# Patient Record
Sex: FEMALE | Race: OTHER | Employment: FULL TIME | URBAN - METROPOLITAN AREA
[De-identification: names, ages, dates, MRNs, and addresses within clinical notes are randomized per-mention and may not be internally consistent; named-entity substitution may affect disease eponyms.]

---

## 2017-03-28 ENCOUNTER — TELEPHONE (OUTPATIENT)
Dept: INTERNAL MEDICINE CLINIC | Facility: CLINIC | Age: 37
End: 2017-03-28

## 2017-03-28 RX ORDER — PREGABALIN 50 MG/1
50 CAPSULE ORAL EVERY EVENING
Qty: 30 CAPSULE | Refills: 0 | Status: SHIPPED | OUTPATIENT
Start: 2017-03-28 | End: 2017-06-22

## 2017-03-28 RX ORDER — PREGABALIN 25 MG/1
25 CAPSULE ORAL
Qty: 30 CAPSULE | Refills: 0 | Status: SHIPPED | OUTPATIENT
Start: 2017-03-28 | End: 2017-05-24

## 2017-03-28 RX ORDER — VALACYCLOVIR HYDROCHLORIDE 1 G/1
TABLET, FILM COATED ORAL
Refills: 2 | COMMUNITY
Start: 2017-02-20 | End: 2017-06-22

## 2017-03-28 RX ORDER — SULFAMETHOXAZOLE AND TRIMETHOPRIM 800; 160 MG/1; MG/1
TABLET ORAL
Refills: 0 | COMMUNITY
Start: 2017-02-20 | End: 2017-06-22

## 2017-03-28 RX ORDER — CONJUGATED ESTROGENS 0.62 MG/G
CREAM VAGINAL
Refills: 2 | COMMUNITY
Start: 2017-03-03 | End: 2017-06-22

## 2017-03-28 RX ORDER — PREGABALIN 50 MG/1
50 CAPSULE ORAL 2 TIMES DAILY
Qty: 60 CAPSULE | Refills: 0 | Status: SHIPPED | OUTPATIENT
Start: 2017-03-28 | End: 2017-03-28 | Stop reason: DRUGHIGH

## 2017-03-28 RX ORDER — PREGABALIN 25 MG/1
25 CAPSULE ORAL 2 TIMES DAILY
Qty: 30 CAPSULE | Refills: 2 | Status: SHIPPED | OUTPATIENT
Start: 2017-03-28 | End: 2017-03-28 | Stop reason: DRUGHIGH

## 2017-03-28 RX ORDER — METRONIDAZOLE 500 MG/1
TABLET ORAL
Refills: 0 | COMMUNITY
Start: 2017-02-03 | End: 2017-06-22

## 2017-05-24 ENCOUNTER — OFFICE VISIT (OUTPATIENT)
Dept: INTERNAL MEDICINE CLINIC | Facility: CLINIC | Age: 37
End: 2017-05-24

## 2017-05-24 VITALS
BODY MASS INDEX: 28.13 KG/M2 | HEIGHT: 66 IN | OXYGEN SATURATION: 96 % | WEIGHT: 175 LBS | HEART RATE: 64 BPM | DIASTOLIC BLOOD PRESSURE: 84 MMHG | SYSTOLIC BLOOD PRESSURE: 118 MMHG | RESPIRATION RATE: 17 BRPM

## 2017-05-24 DIAGNOSIS — R82.90 ABNORMAL URINALYSIS: ICD-10-CM

## 2017-05-24 DIAGNOSIS — R14.0 ABDOMINAL BLOATING: ICD-10-CM

## 2017-05-24 DIAGNOSIS — M54.9 BACK PAIN, UNSPECIFIED BACK LOCATION, UNSPECIFIED BACK PAIN LATERALITY, UNSPECIFIED CHRONICITY: ICD-10-CM

## 2017-05-24 DIAGNOSIS — M79.7 FIBROMYALGIA: ICD-10-CM

## 2017-05-24 DIAGNOSIS — R10.2 PELVIC PAIN: Primary | ICD-10-CM

## 2017-05-24 PROCEDURE — 36415 COLL VENOUS BLD VENIPUNCTURE: CPT | Performed by: INTERNAL MEDICINE

## 2017-05-24 PROCEDURE — 99213 OFFICE O/P EST LOW 20 MIN: CPT | Performed by: INTERNAL MEDICINE

## 2017-05-24 PROCEDURE — 82784 ASSAY IGA/IGD/IGG/IGM EACH: CPT | Performed by: INTERNAL MEDICINE

## 2017-05-24 PROCEDURE — 87086 URINE CULTURE/COLONY COUNT: CPT | Performed by: INTERNAL MEDICINE

## 2017-05-24 PROCEDURE — 83516 IMMUNOASSAY NONANTIBODY: CPT | Performed by: INTERNAL MEDICINE

## 2017-05-24 PROCEDURE — 80053 COMPREHEN METABOLIC PANEL: CPT | Performed by: INTERNAL MEDICINE

## 2017-05-24 PROCEDURE — 85025 COMPLETE CBC W/AUTO DIFF WBC: CPT | Performed by: INTERNAL MEDICINE

## 2017-05-24 RX ORDER — PREGABALIN 25 MG/1
25 CAPSULE ORAL
Qty: 90 CAPSULE | Refills: 0 | Status: SHIPPED | OUTPATIENT
Start: 2017-05-24 | End: 2017-06-22

## 2017-05-24 RX ORDER — MELOXICAM 15 MG/1
15 TABLET ORAL DAILY PRN
Qty: 15 TABLET | Refills: 2 | Status: SHIPPED | OUTPATIENT
Start: 2017-05-24 | End: 2017-06-08

## 2017-05-24 RX ORDER — PHENAZOPYRIDINE HYDROCHLORIDE 100 MG/1
TABLET, FILM COATED ORAL
Refills: 0 | COMMUNITY
Start: 2017-04-13 | End: 2017-06-22

## 2017-05-24 RX ORDER — PREGABALIN 75 MG/1
75 CAPSULE ORAL 2 TIMES DAILY
Qty: 90 CAPSULE | Refills: 0 | Status: SHIPPED | OUTPATIENT
Start: 2017-05-24 | End: 2017-05-24

## 2017-05-24 RX ORDER — PREGABALIN 75 MG/1
75 CAPSULE ORAL EVERY EVENING
Qty: 90 CAPSULE | Refills: 0 | Status: SHIPPED | OUTPATIENT
Start: 2017-05-24 | End: 2017-08-22

## 2017-05-24 RX ORDER — PENTOSAN POLYSULFATE SODIUM 100 MG/1
CAPSULE, GELATIN COATED ORAL
Refills: 0 | COMMUNITY
Start: 2017-05-04 | End: 2017-06-22

## 2017-05-24 NOTE — PROGRESS NOTES
Chayo Child is a 39year old female.     Chief complaint:back pain, pelvic cramps , abdominal bloating     HPI:   39year old female with PMH as listed below here for  Back pain   Started 5/10/18 lower back   No loss of bladder or bowel control  No radiati 66\"  Wt 175 lb  BMI 28.26 kg/m2  SpO2 96%  LMP 05/02/2017  GENERAL: well developed, well nourished,in no apparent distress  SKIN: no rashes,no suspicious lesions  HEENT: atraumatic, normocephalic,ears and throat are clear  NECK: supple,no adenopathy  LUNG back pain laterality, unspecified chronicity  Plan: mobic prn              Please return to the clinic if you are having persistent or worsening symptoms

## 2017-05-25 ENCOUNTER — HOSPITAL ENCOUNTER (OUTPATIENT)
Dept: ULTRASOUND IMAGING | Facility: HOSPITAL | Age: 37
Discharge: HOME | End: 2017-05-25
Attending: INTERNAL MEDICINE
Payer: COMMERCIAL

## 2017-05-25 DIAGNOSIS — R10.2 PELVIC PAIN: ICD-10-CM

## 2017-05-25 PROCEDURE — 76830 TRANSVAGINAL US NON-OB: CPT | Performed by: INTERNAL MEDICINE

## 2017-05-25 PROCEDURE — 76856 US EXAM PELVIC COMPLETE: CPT | Performed by: INTERNAL MEDICINE

## 2017-05-30 ENCOUNTER — TELEPHONE (OUTPATIENT)
Dept: INTERNAL MEDICINE CLINIC | Facility: CLINIC | Age: 37
End: 2017-05-30

## 2017-05-30 DIAGNOSIS — N83.209 OVARIAN CYST: Primary | ICD-10-CM

## 2017-05-31 ENCOUNTER — TELEPHONE (OUTPATIENT)
Dept: INTERNAL MEDICINE CLINIC | Facility: CLINIC | Age: 37
End: 2017-05-31

## 2017-06-09 ENCOUNTER — TELEPHONE (OUTPATIENT)
Dept: INTERNAL MEDICINE CLINIC | Facility: CLINIC | Age: 37
End: 2017-06-09

## 2017-06-09 NOTE — TELEPHONE ENCOUNTER
Pt's  verified  Dr. John Lenz re: missed appt- informed she waited for Pt and 2 phone calls sent no response

## 2017-06-22 ENCOUNTER — OFFICE VISIT (OUTPATIENT)
Dept: GASTROENTEROLOGY | Facility: CLINIC | Age: 37
End: 2017-06-22

## 2017-06-22 VITALS
SYSTOLIC BLOOD PRESSURE: 116 MMHG | HEIGHT: 65.5 IN | DIASTOLIC BLOOD PRESSURE: 76 MMHG | WEIGHT: 176.38 LBS | HEART RATE: 79 BPM | BODY MASS INDEX: 29.03 KG/M2

## 2017-06-22 DIAGNOSIS — R19.4 CHANGE IN BOWEL HABITS: ICD-10-CM

## 2017-06-22 DIAGNOSIS — R10.32 LLQ PAIN: ICD-10-CM

## 2017-06-22 DIAGNOSIS — R14.0 BLOATING: Primary | ICD-10-CM

## 2017-06-22 PROCEDURE — 99212 OFFICE O/P EST SF 10 MIN: CPT | Performed by: PHYSICIAN ASSISTANT

## 2017-06-22 PROCEDURE — 99244 OFF/OP CNSLTJ NEW/EST MOD 40: CPT | Performed by: PHYSICIAN ASSISTANT

## 2017-06-22 RX ORDER — HYDROCODONE BITARTRATE AND ACETAMINOPHEN 5; 325 MG/1; MG/1
1 TABLET ORAL ONCE AS NEEDED
Refills: 0 | COMMUNITY
Start: 2017-05-24 | End: 2017-06-22

## 2017-06-22 NOTE — H&P
[address] [facility] [address] Gastroenterology                                                                                                  Clinic History and Physical     Pa increase her bloating sensation. She denies dyspeptic symptoms such as increased belching. There is no report of indigestion or heartburn, dysphagia, odynophagia, nausea, vomiting or hematemesis.   She denies hematochezia, rectal bleeding, rectal pain, me BREAKFAST Disp: 30 capsule Rfl: 0   pregabalin (LYRICA) 75 MG Oral Cap Take 1 capsule (75 mg total) by mouth every evening.  Disp: 90 capsule Rfl: 0       Allergies:  No Known Allergies    ROS:   CONSTITUTIONAL:  negative for fevers, rigors  EYES:  negative vaginal cavity\" wherein patient underwent US Pelvis 5/2017 revealing \"mildly prominent uterine size and hemorrhagic appearing complex cyst in right ovary\".  Celiac disease panel negative, no anemia, LFTs normal.  Of note, the patient is to follow-up with

## 2017-06-26 ENCOUNTER — TELEPHONE (OUTPATIENT)
Dept: OBGYN CLINIC | Facility: CLINIC | Age: 37
End: 2017-06-26

## 2017-06-26 ENCOUNTER — OFFICE VISIT (OUTPATIENT)
Dept: OBGYN CLINIC | Facility: CLINIC | Age: 37
End: 2017-06-26

## 2017-06-26 VITALS
SYSTOLIC BLOOD PRESSURE: 101 MMHG | HEIGHT: 65.5 IN | WEIGHT: 177.63 LBS | DIASTOLIC BLOOD PRESSURE: 67 MMHG | BODY MASS INDEX: 29.24 KG/M2 | HEART RATE: 76 BPM

## 2017-06-26 DIAGNOSIS — R14.0 BLOATING: ICD-10-CM

## 2017-06-26 DIAGNOSIS — R10.2 PELVIC PAIN IN FEMALE: Primary | ICD-10-CM

## 2017-06-26 DIAGNOSIS — N89.8 VAGINAL DISCHARGE: ICD-10-CM

## 2017-06-26 DIAGNOSIS — R36.9 URETHRAL DISCHARGE: Primary | ICD-10-CM

## 2017-06-26 DIAGNOSIS — N83.209 HEMORRHAGIC OVARIAN CYST: ICD-10-CM

## 2017-06-26 DIAGNOSIS — N89.8 VAGINAL ODOR: ICD-10-CM

## 2017-06-26 PROCEDURE — 99204 OFFICE O/P NEW MOD 45 MIN: CPT | Performed by: OBSTETRICS & GYNECOLOGY

## 2017-06-26 NOTE — PROGRESS NOTES
Chayo Child is a 39year old female  Patient's last menstrual period was 2017. Patient presents today as a new patient for multiple concerns  Patient states she has had a 6 month history of pelvic pain that she describes as cramping pain.  She children: N/A     Occupational History  None on file     Social History Main Topics   Smoking status: Never Smoker    Smokeless tobacco: Never Used    Alcohol use Yes  0.0 oz/week     Comment: social    Drug use: No    Sexual activity: Not on file    Comme masses or tenderness  Perineum: normal    Imaging:    ULTRASOUND:  9.1cm uterus with 10.6mm stripe. No fluid or masses  Right ovary with 1.8cm hemorrhagic cyst   Left ovary normal  Assessment & Plan:  Jakob Centeno was seen today for gyn problem.     Diagnoses and a

## 2017-06-27 NOTE — TELEPHONE ENCOUNTER
Lmtcb. Please inform the patient her referral is in proces and can take2-5 days for approval.  Patient can schedule at either location noted below.      Valerie Sinclair MD.  [address]  Phone: [phone]  Fax: [phone]

## 2017-06-27 NOTE — TELEPHONE ENCOUNTER
RC leave all info on  [phone]  Also pt would like to know does she need a f/u visit w Dr. Jae Dale

## 2017-07-03 ENCOUNTER — TELEPHONE (OUTPATIENT)
Dept: OBGYN CLINIC | Facility: CLINIC | Age: 37
End: 2017-07-03

## 2017-07-04 NOTE — TELEPHONE ENCOUNTER
----- Message from Josephine Martinez MD sent at 6/30/2017  8:34 PM CDT -----  Please let patient know that her vaginal culture was negative.

## 2017-07-07 NOTE — TELEPHONE ENCOUNTER
Pt notified of message below for Dr Izzy Elkins referral and f/u with [facility]. Pt provided with # to call and schedule appt with Dr Izzy Elkins.

## 2017-07-31 ENCOUNTER — TELEPHONE (OUTPATIENT)
Dept: GASTROENTEROLOGY | Facility: CLINIC | Age: 37
End: 2017-07-31

## 2017-07-31 NOTE — TELEPHONE ENCOUNTER
Left detailed message for the patient to come into the lab M-F from 6:30am-8pm, (no appt needed) to  the specimen cup for the h. Pylori stool test and to complete it prior to her scheduled appt on 8/3/17.  Told to CB if she has further questions or c

## 2017-07-31 NOTE — TELEPHONE ENCOUNTER
I attempted to call the patient today as she has a f/u with me in a few days planned. AVS and orders for H. Pylori for bloating. I could not [facility] - system would not allow me to.  Please reach out to patient and have H. Pylori examination completed prior to f

## 2017-07-31 NOTE — TELEPHONE ENCOUNTER
I attempted to call the patient today as she has a f/u with me in a few days planned. AVS and orders for H. Pylori for bloating. I could not [facility] - system would not allow me to.  Please reach out to patient and have H. Pylori examination completed prior to f

## 2017-08-01 NOTE — TELEPHONE ENCOUNTER
Pt contacted and she states she is aware of the message below and needs to reschedule her appt with PB in order to complete the h. Pylori test prior to appt. Call transferred ROBSON to reschedule appt, thank you.

## 2017-09-14 ENCOUNTER — OFFICE VISIT (OUTPATIENT)
Dept: OTOLARYNGOLOGY | Facility: CLINIC | Age: 37
End: 2017-09-14

## 2017-09-14 VITALS
WEIGHT: 177 LBS | TEMPERATURE: 98 F | HEIGHT: 65 IN | SYSTOLIC BLOOD PRESSURE: 131 MMHG | BODY MASS INDEX: 29.49 KG/M2 | HEART RATE: 117 BPM | DIASTOLIC BLOOD PRESSURE: 82 MMHG

## 2017-09-14 DIAGNOSIS — R07.0 THROAT DISCOMFORT: Primary | ICD-10-CM

## 2017-09-14 PROCEDURE — 31575 DIAGNOSTIC LARYNGOSCOPY: CPT | Performed by: OTOLARYNGOLOGY

## 2017-09-14 PROCEDURE — 99212 OFFICE O/P EST SF 10 MIN: CPT | Performed by: OTOLARYNGOLOGY

## 2017-09-14 PROCEDURE — 99214 OFFICE O/P EST MOD 30 MIN: CPT | Performed by: OTOLARYNGOLOGY

## 2017-09-14 RX ORDER — FLUTICASONE PROPIONATE 50 MCG
1 SPRAY, SUSPENSION (ML) NASAL 2 TIMES DAILY
Qty: 1 BOTTLE | Refills: 3 | Status: SHIPPED | OUTPATIENT
Start: 2017-09-14 | End: 2018-01-04

## 2017-09-14 RX ORDER — MONTELUKAST SODIUM 10 MG/1
10 TABLET ORAL NIGHTLY
Qty: 30 TABLET | Refills: 3 | Status: SHIPPED | OUTPATIENT
Start: 2017-09-14 | End: 2019-06-28

## 2017-09-14 RX ORDER — OMEPRAZOLE 40 MG/1
40 CAPSULE, DELAYED RELEASE ORAL DAILY
Qty: 30 CAPSULE | Refills: 2 | Status: SHIPPED | OUTPATIENT
Start: 2017-09-14 | End: 2017-09-21 | Stop reason: ALTCHOICE

## 2017-09-14 NOTE — PROGRESS NOTES
Lauren Duran is a 39year old female.   Patient presents with:  Throat Problem: patient presents for difficulty swallowing food and water tried prescribed medication without relief      HISTORY OF PRESENT ILLNESS  2/9/16 She presents with a history of swell Relation Age of Onset   • Diabetes Father    • Hypertension Father    • Diabetes Mother    • Hypertension Mother        Past Medical History:   Diagnosis Date   • Fibromyalgia        No past surgical history on file.       REVIEW OF SYSTEMS    System Neg/Po Submandibular. Anterior cervical. Posterior cervical. Supraclavicular.         Nose/Mouth/Throat Normal External nose - Normal. Lips/teeth/gums - Normal. Tonsils - Normal. Oropharynx - Normal.   Nose/Mouth/Throat Normal Nares - Right: Normal Left: Normal. S CAPSULE BY MOUTH DAILY WITH BREAKFAST, Disp: 30 capsule, Rfl: 0  ASSESSMENT AND PLAN    1. Throat discomfort  Very erythematous larynx. I suspect postnasal discharge and reflux.   Start omeprazole loratadine D, Singulair and fluticasone and return to see m

## 2017-09-18 ENCOUNTER — APPOINTMENT (OUTPATIENT)
Dept: LAB | Age: 37
End: 2017-09-18
Attending: PHYSICIAN ASSISTANT
Payer: COMMERCIAL

## 2017-09-18 DIAGNOSIS — R14.0 BLOATING: ICD-10-CM

## 2017-09-18 PROCEDURE — 87338 HPYLORI STOOL AG IA: CPT

## 2017-09-20 LAB — HELICOBACTER PYLORI AG, FECAL: POSITIVE

## 2017-09-21 ENCOUNTER — OFFICE VISIT (OUTPATIENT)
Dept: INTERNAL MEDICINE CLINIC | Facility: CLINIC | Age: 37
End: 2017-09-21

## 2017-09-21 ENCOUNTER — TELEPHONE (OUTPATIENT)
Dept: GASTROENTEROLOGY | Facility: CLINIC | Age: 37
End: 2017-09-21

## 2017-09-21 VITALS
HEART RATE: 69 BPM | DIASTOLIC BLOOD PRESSURE: 78 MMHG | RESPIRATION RATE: 17 BRPM | WEIGHT: 176 LBS | SYSTOLIC BLOOD PRESSURE: 106 MMHG | OXYGEN SATURATION: 99 % | BODY MASS INDEX: 29.32 KG/M2 | HEIGHT: 65 IN

## 2017-09-21 DIAGNOSIS — A04.8 H. PYLORI INFECTION: Primary | ICD-10-CM

## 2017-09-21 DIAGNOSIS — R42 DIZZINESS: ICD-10-CM

## 2017-09-21 DIAGNOSIS — F34.1 DYSTHYMIA: ICD-10-CM

## 2017-09-21 DIAGNOSIS — A04.8 POSITIVE H. PYLORI TEST: Primary | ICD-10-CM

## 2017-09-21 PROCEDURE — 99213 OFFICE O/P EST LOW 20 MIN: CPT | Performed by: FAMILY MEDICINE

## 2017-09-21 RX ORDER — LANSOPRAZOLE, AMOXICILLIN, CLARITHROMYCIN 30-500-500
1 KIT ORAL 2 TIMES DAILY
Qty: 112 EACH | Refills: 0 | Status: SHIPPED | OUTPATIENT
Start: 2017-09-21 | End: 2017-10-03 | Stop reason: ALTCHOICE

## 2017-09-21 RX ORDER — AMOXICILLIN 500 MG/1
1000 TABLET, FILM COATED ORAL 2 TIMES DAILY
Qty: 56 TABLET | Refills: 0 | Status: SHIPPED | OUTPATIENT
Start: 2017-09-21 | End: 2017-10-03 | Stop reason: ALTCHOICE

## 2017-09-21 RX ORDER — PANTOPRAZOLE SODIUM 40 MG/1
40 TABLET, DELAYED RELEASE ORAL
Qty: 28 TABLET | Refills: 0 | Status: SHIPPED | OUTPATIENT
Start: 2017-09-21 | End: 2017-10-05

## 2017-09-21 RX ORDER — CLARITHROMYCIN 500 MG/1
500 TABLET, COATED ORAL 2 TIMES DAILY
Qty: 28 TABLET | Refills: 0 | Status: SHIPPED | OUTPATIENT
Start: 2017-09-21 | End: 2017-10-03 | Stop reason: ALTCHOICE

## 2017-09-21 RX ORDER — ESCITALOPRAM OXALATE 10 MG/1
10 TABLET ORAL DAILY
Qty: 90 TABLET | Refills: 0 | Status: SHIPPED | OUTPATIENT
Start: 2017-09-21 | End: 2017-11-22

## 2017-09-21 NOTE — TELEPHONE ENCOUNTER
Patient saw Dr. Carlos Hutchinson this morning, discussed results. I went over the pathophysiology and possible risks with H. Pylori and the importance of treatment as well as the possible 5-10% chance for failure of therapy given the first round of antibiotics.  Brandi

## 2017-09-21 NOTE — TELEPHONE ENCOUNTER
+ H. Pylori   I would like to discuss results, treatment, Earnie Shiver with patient over the phone. LVM for patient to call back. Please take a message with a few dates and times that work best for the patient. OK to release result of H. Pylori.

## 2017-09-22 NOTE — TELEPHONE ENCOUNTER
Left detailed message for pt that PB would like to see her for f/u appt in 2-3 months per message below, and she currently has appt scheduled for 9/28/17.      I reviewed that if she would like to keep the 9/28/17 appt due to her sxs, she may, otherwise, CS

## 2017-09-22 NOTE — PROGRESS NOTES
CC:  Dizziness (Pt presents to clinic for c/o dizzy spells x 10 days and nausea x 2wks. )      Hx of CC:  A COUPLE WEEKS WITH EPISODIC LIGHT HEADEDNESS (NO VERTIGO) AND NAUSEA. USUAL LIFE STRESSORS BUT NO MAJOR CHANGES.   REPORTS THAT SHE IS MORE FORGETFUL

## 2017-10-03 ENCOUNTER — OFFICE VISIT (OUTPATIENT)
Dept: INTERNAL MEDICINE CLINIC | Facility: CLINIC | Age: 37
End: 2017-10-03

## 2017-10-03 VITALS
HEIGHT: 65 IN | HEART RATE: 73 BPM | RESPIRATION RATE: 17 BRPM | TEMPERATURE: 99 F | BODY MASS INDEX: 29.59 KG/M2 | DIASTOLIC BLOOD PRESSURE: 70 MMHG | OXYGEN SATURATION: 98 % | SYSTOLIC BLOOD PRESSURE: 122 MMHG | WEIGHT: 177.63 LBS

## 2017-10-03 DIAGNOSIS — Z23 INFLUENZA VACCINE NEEDED: ICD-10-CM

## 2017-10-03 DIAGNOSIS — A04.8 POSITIVE H. PYLORI TEST: ICD-10-CM

## 2017-10-03 DIAGNOSIS — R30.0 DYSURIA: Primary | ICD-10-CM

## 2017-10-03 PROCEDURE — 99214 OFFICE O/P EST MOD 30 MIN: CPT | Performed by: INTERNAL MEDICINE

## 2017-10-03 PROCEDURE — 81002 URINALYSIS NONAUTO W/O SCOPE: CPT | Performed by: INTERNAL MEDICINE

## 2017-10-03 RX ORDER — IBUPROFEN 800 MG/1
800 TABLET ORAL EVERY 6 HOURS PRN
Qty: 60 TABLET | Refills: 1 | Status: SHIPPED | OUTPATIENT
Start: 2017-10-03 | End: 2017-12-19

## 2017-10-03 RX ORDER — OMEPRAZOLE 40 MG/1
CAPSULE, DELAYED RELEASE ORAL
COMMUNITY
Start: 2017-09-15 | End: 2018-01-04

## 2017-10-03 NOTE — PROGRESS NOTES
HPI:    Patient ID: Phuong Camejo is a 44year old female. Pt here for a fu on H.pylori. Has completed entire rx for the disease . Has mouth discomfort from the ab. Also has some abdominal pains.         Review of Systems   Constitutional: Negative for 3   LYRICA 25 MG Oral Cap ONE CAPSULE BY MOUTH DAILY WITH BREAKFAST Disp: 30 capsule Rfl: 0   escitalopram (LEXAPRO) 10 MG Oral Tab Take 1 tablet (10 mg total) by mouth daily.  Disp: 90 tablet Rfl: 0     Allergies:No Known Allergies   PHYSICAL EXAM:   Physi

## 2017-10-18 ENCOUNTER — APPOINTMENT (OUTPATIENT)
Dept: LAB | Facility: HOSPITAL | Age: 37
End: 2017-10-18
Attending: OBSTETRICS & GYNECOLOGY
Payer: COMMERCIAL

## 2017-10-18 ENCOUNTER — OFFICE VISIT (OUTPATIENT)
Dept: OBGYN CLINIC | Facility: CLINIC | Age: 37
End: 2017-10-18

## 2017-10-18 VITALS
SYSTOLIC BLOOD PRESSURE: 123 MMHG | HEART RATE: 90 BPM | DIASTOLIC BLOOD PRESSURE: 80 MMHG | WEIGHT: 177 LBS | BODY MASS INDEX: 29 KG/M2

## 2017-10-18 DIAGNOSIS — N89.8 VAGINAL ODOR: ICD-10-CM

## 2017-10-18 DIAGNOSIS — Z11.3 SCREEN FOR STD (SEXUALLY TRANSMITTED DISEASE): ICD-10-CM

## 2017-10-18 DIAGNOSIS — Z01.419 ENCOUNTER FOR GYNECOLOGICAL EXAMINATION WITHOUT ABNORMAL FINDING: Primary | ICD-10-CM

## 2017-10-18 DIAGNOSIS — R10.2 PELVIC PAIN: ICD-10-CM

## 2017-10-18 PROCEDURE — 99213 OFFICE O/P EST LOW 20 MIN: CPT | Performed by: OBSTETRICS & GYNECOLOGY

## 2017-10-18 PROCEDURE — 99395 PREV VISIT EST AGE 18-39: CPT | Performed by: OBSTETRICS & GYNECOLOGY

## 2017-10-18 PROCEDURE — 86803 HEPATITIS C AB TEST: CPT

## 2017-10-18 PROCEDURE — 87340 HEPATITIS B SURFACE AG IA: CPT

## 2017-10-18 PROCEDURE — 87591 N.GONORRHOEAE DNA AMP PROB: CPT

## 2017-10-18 PROCEDURE — 86780 TREPONEMA PALLIDUM: CPT

## 2017-10-18 PROCEDURE — 36415 COLL VENOUS BLD VENIPUNCTURE: CPT

## 2017-10-18 PROCEDURE — 87491 CHLMYD TRACH DNA AMP PROBE: CPT

## 2017-10-18 PROCEDURE — 87389 HIV-1 AG W/HIV-1&-2 AB AG IA: CPT

## 2017-10-18 NOTE — PROGRESS NOTES
Well Woman Exam    HPI:  The patient is a 44yo female who presents for annual exam.   She also complains of pelvic pressure and pain. She states it feels like cramping and it makes her feel \"fat. \" She has a \"muffin top\" when she has the pain.  The pain Rfl:   •  ibuprofen 800 MG Oral Tab, Take 1 tablet (800 mg total) by mouth every 6 (six) hours as needed for Pain., Disp: 60 tablet, Rfl: 1  •  LYRICA 75 MG Oral Cap, TAKE ONE CAPSULE BY MOUTH IN THE EVENING, Disp: 90 capsule, Rfl: 0  •  Montelukast Sodium bruises  Extremities: no edema, no cyanosis  Psychiatric: Appropriate mood and affect    Pelvic Exam:  External Genitalia: normal appearance, hair distribution, and no lesions  Urethral Meatus:  normal in size, location, without lesions and prolapse  Bladd

## 2017-10-26 ENCOUNTER — TELEPHONE (OUTPATIENT)
Dept: OBGYN CLINIC | Facility: CLINIC | Age: 37
End: 2017-10-26

## 2017-10-26 NOTE — TELEPHONE ENCOUNTER
----- Message from Carlos Hutchins MD sent at 10/26/2017  2:30 PM CDT -----  Please let patient know that her vaginal culture was negative as well as her STD screening.

## 2017-11-02 ENCOUNTER — HOSPITAL ENCOUNTER (OUTPATIENT)
Dept: ULTRASOUND IMAGING | Facility: HOSPITAL | Age: 37
Discharge: HOME | End: 2017-11-02
Attending: OBSTETRICS & GYNECOLOGY
Payer: COMMERCIAL

## 2017-11-02 DIAGNOSIS — R10.2 PELVIC PAIN: ICD-10-CM

## 2017-11-02 PROCEDURE — 76830 TRANSVAGINAL US NON-OB: CPT | Performed by: OBSTETRICS & GYNECOLOGY

## 2017-11-02 PROCEDURE — 76856 US EXAM PELVIC COMPLETE: CPT | Performed by: OBSTETRICS & GYNECOLOGY

## 2017-11-08 ENCOUNTER — APPOINTMENT (OUTPATIENT)
Dept: LAB | Facility: HOSPITAL | Age: 37
End: 2017-11-08
Attending: PHYSICIAN ASSISTANT
Payer: COMMERCIAL

## 2017-11-08 DIAGNOSIS — A04.8 H. PYLORI INFECTION: ICD-10-CM

## 2017-11-08 PROCEDURE — 87338 HPYLORI STOOL AG IA: CPT

## 2017-11-20 ENCOUNTER — TELEPHONE (OUTPATIENT)
Dept: OBGYN CLINIC | Facility: CLINIC | Age: 37
End: 2017-11-20

## 2017-11-20 NOTE — TELEPHONE ENCOUNTER
----- Message from Mabeline Lesch, MD sent at 11/18/2017  3:30 PM CST -----  Please let patient know that her US was normal. Her small hemorrhagic cyst is considered normal and will resolve.

## 2017-11-21 PROBLEM — Z86.19 HISTORY OF HELICOBACTER PYLORI INFECTION: Status: ACTIVE | Noted: 2017-11-21

## 2017-11-21 NOTE — PROGRESS NOTES
[address] [facility] Follow-up Visit    Gladys specifically denies vomiting, hematemesis, dysphagia, odynophagia, hematochezia, melena, thin stools or weight loss. She endorses feeling full after eating a small amount, which started in the last few days. No fevers/chills. Denies CP or SOB.  No dysuria, few weeks and is not present currently. She cannot identify triggers or alleviating factors for this. There is no report of travel outside the country, sick contacts nor new medications.   Patient was on Norco at one point which affected her bowels, but s Omeprazole 40 MG Oral Capsule Delayed Release  Disp:  Rfl:    ibuprofen 800 MG Oral Tab Take 1 tablet (800 mg total) by mouth every 6 (six) hours as needed for Pain.  Disp: 60 tablet Rfl: 1   LYRICA 75 MG Oral Cap TAKE ONE CAPSULE BY MOUTH IN THE EVENING extremities     Nursing notes and vitals reviewed. Labs/Imaging:     Patient's labs and imaging were reviewed and discussed with patient today.      ASSESSMENT/PLAN:   Rosario Correa is a 39year old female patient with history of fibromyalgia of Dr. Shereen Smith supplemented.  Consider generic split dose colyte or trilyte.   -Continue all medications as prescribed  -Omeprazole 40 mg each day - anti-reflux precautions  -D&L modifications discussed in office  -Try fiber supplement daily   -Food/symptom diary  -Follow

## 2017-11-22 ENCOUNTER — TELEPHONE (OUTPATIENT)
Dept: GASTROENTEROLOGY | Facility: CLINIC | Age: 37
End: 2017-11-22

## 2017-11-22 ENCOUNTER — OFFICE VISIT (OUTPATIENT)
Dept: GASTROENTEROLOGY | Facility: CLINIC | Age: 37
End: 2017-11-22

## 2017-11-22 VITALS
WEIGHT: 177.63 LBS | DIASTOLIC BLOOD PRESSURE: 74 MMHG | HEIGHT: 65 IN | BODY MASS INDEX: 29.59 KG/M2 | HEART RATE: 75 BPM | SYSTOLIC BLOOD PRESSURE: 115 MMHG

## 2017-11-22 DIAGNOSIS — R14.0 BLOATING: Primary | ICD-10-CM

## 2017-11-22 DIAGNOSIS — R10.9 ABDOMINAL CRAMPING: ICD-10-CM

## 2017-11-22 DIAGNOSIS — R68.81 EARLY SATIETY: ICD-10-CM

## 2017-11-22 DIAGNOSIS — Z86.19 HISTORY OF HELICOBACTER PYLORI INFECTION: ICD-10-CM

## 2017-11-22 DIAGNOSIS — R19.5 LOOSE STOOLS: ICD-10-CM

## 2017-11-22 PROCEDURE — 99214 OFFICE O/P EST MOD 30 MIN: CPT | Performed by: PHYSICIAN ASSISTANT

## 2017-11-22 PROCEDURE — 99212 OFFICE O/P EST SF 10 MIN: CPT | Performed by: PHYSICIAN ASSISTANT

## 2017-11-22 RX ORDER — DICYCLOMINE HYDROCHLORIDE 10 MG/1
10 CAPSULE ORAL
Qty: 120 CAPSULE | Refills: 0 | Status: SHIPPED | OUTPATIENT
Start: 2017-11-22 | End: 2018-01-04

## 2017-11-22 RX ORDER — ONDANSETRON 4 MG/1
4 TABLET, FILM COATED ORAL EVERY 8 HOURS PRN
Qty: 30 TABLET | Refills: 0 | Status: SHIPPED | OUTPATIENT
Start: 2017-11-22 | End: 2018-09-07 | Stop reason: ALTCHOICE

## 2017-11-22 NOTE — TELEPHONE ENCOUNTER
Scheduled for:  Colonoscopy [id number] / [phone]  Provider Name: Dr. Anayeli Gonzalez  Date:  12/19/17  Location:  [address] [place] [address]  Sedation:  MAC  Time:  3:00 pm, arrival 2:00 pm  Prep: Suprep, NPO 3 hours prior to procedure  Meds/Allergies Reconciled?:  Brennan Ro PA-C reviewed

## 2017-11-22 NOTE — H&P
Attending physician addendum:    I have reviewed the medical record, medications and thorough consultation as delineated by Vic Wood PA-C.  I agree with the current recommendations:    \"-Colonoscopy + upper endoscopy with MAC with Dr. Santa Grier at [facility]

## 2017-11-22 NOTE — PATIENT INSTRUCTIONS
1. Schedule colonoscopy and upper endoscopy with MAC sedation with Dr. Santa Grier at [facility].    2.  bowel prep from pharmacy - I have prescribed Suprep. This is a smaller volume preparation.  If this is too expensive, however, please call my office

## 2017-12-01 ENCOUNTER — PATIENT MESSAGE (OUTPATIENT)
Dept: GASTROENTEROLOGY | Facility: CLINIC | Age: 37
End: 2017-12-01

## 2017-12-01 NOTE — TELEPHONE ENCOUNTER
From: Rosario Correa  To: David Coffman  Sent: 12/1/2017 10:19 AM CST  Subject: Other    Good Morning. Per your request, I am sending you a message to let you know how I'm feeling after my visit last week Thursday.      I feel like my nausea is somewha

## 2017-12-14 ENCOUNTER — TELEPHONE (OUTPATIENT)
Dept: INTERNAL MEDICINE CLINIC | Facility: CLINIC | Age: 37
End: 2017-12-14

## 2017-12-15 ENCOUNTER — TELEPHONE (OUTPATIENT)
Dept: INTERNAL MEDICINE CLINIC | Facility: CLINIC | Age: 37
End: 2017-12-15

## 2017-12-15 NOTE — TELEPHONE ENCOUNTER
Pt's  verified  Fax received for Proof of flu vaccine administration from [place]- immunization list updated and copy sent to scanning- Fluarix vaccine given on 17

## 2017-12-19 ENCOUNTER — SURGERY (OUTPATIENT)
Age: 37
End: 2017-12-19

## 2017-12-19 ENCOUNTER — ANESTHESIA EVENT (OUTPATIENT)
Dept: ENDOSCOPY | Age: 37
End: 2017-12-19
Payer: COMMERCIAL

## 2017-12-19 ENCOUNTER — ANESTHESIA (OUTPATIENT)
Dept: ENDOSCOPY | Age: 37
End: 2017-12-19
Payer: COMMERCIAL

## 2017-12-19 ENCOUNTER — HOSPITAL ENCOUNTER (OUTPATIENT)
Age: 37
Setting detail: HOSPITAL OUTPATIENT SURGERY
Discharge: HOME | End: 2017-12-19
Attending: INTERNAL MEDICINE | Admitting: INTERNAL MEDICINE
Payer: COMMERCIAL

## 2017-12-19 VITALS
DIASTOLIC BLOOD PRESSURE: 68 MMHG | WEIGHT: 170 LBS | BODY MASS INDEX: 28.32 KG/M2 | RESPIRATION RATE: 16 BRPM | HEART RATE: 69 BPM | SYSTOLIC BLOOD PRESSURE: 109 MMHG | OXYGEN SATURATION: 100 % | HEIGHT: 65 IN

## 2017-12-19 DIAGNOSIS — R10.9 ABDOMINAL CRAMPING: Primary | ICD-10-CM

## 2017-12-19 DIAGNOSIS — R19.5 LOOSE STOOLS: ICD-10-CM

## 2017-12-19 DIAGNOSIS — Z86.19 HISTORY OF HELICOBACTER PYLORI INFECTION: ICD-10-CM

## 2017-12-19 DIAGNOSIS — R68.81 EARLY SATIETY: ICD-10-CM

## 2017-12-19 DIAGNOSIS — R14.0 BLOATING: ICD-10-CM

## 2017-12-19 PROBLEM — K29.70 GASTRITIS: Status: ACTIVE | Noted: 2017-12-19

## 2017-12-19 PROBLEM — K64.8 INTERNAL HEMORRHOIDS: Status: ACTIVE | Noted: 2017-12-19

## 2017-12-19 PROCEDURE — 45378 DIAGNOSTIC COLONOSCOPY: CPT | Performed by: INTERNAL MEDICINE

## 2017-12-19 PROCEDURE — 43239 EGD BIOPSY SINGLE/MULTIPLE: CPT | Performed by: INTERNAL MEDICINE

## 2017-12-19 RX ORDER — LIDOCAINE HYDROCHLORIDE 10 MG/ML
INJECTION, SOLUTION EPIDURAL; INFILTRATION; INTRACAUDAL; PERINEURAL AS NEEDED
Status: DISCONTINUED | OUTPATIENT
Start: 2017-12-19 | End: 2017-12-19 | Stop reason: SURG

## 2017-12-19 RX ORDER — OMEPRAZOLE 20 MG/1
20 CAPSULE, DELAYED RELEASE ORAL
Qty: 90 CAPSULE | Refills: 3 | Status: SHIPPED | OUTPATIENT
Start: 2017-12-19 | End: 2018-01-04

## 2017-12-19 RX ORDER — SODIUM CHLORIDE, SODIUM LACTATE, POTASSIUM CHLORIDE, CALCIUM CHLORIDE 600; 310; 30; 20 MG/100ML; MG/100ML; MG/100ML; MG/100ML
INJECTION, SOLUTION INTRAVENOUS CONTINUOUS
Status: DISCONTINUED | OUTPATIENT
Start: 2017-12-19 | End: 2017-12-19

## 2017-12-19 RX ORDER — NALOXONE HYDROCHLORIDE 0.4 MG/ML
80 INJECTION, SOLUTION INTRAMUSCULAR; INTRAVENOUS; SUBCUTANEOUS AS NEEDED
Status: DISCONTINUED | OUTPATIENT
Start: 2017-12-19 | End: 2017-12-19

## 2017-12-19 RX ORDER — SODIUM CHLORIDE, SODIUM LACTATE, POTASSIUM CHLORIDE, CALCIUM CHLORIDE 600; 310; 30; 20 MG/100ML; MG/100ML; MG/100ML; MG/100ML
INJECTION, SOLUTION INTRAVENOUS CONTINUOUS PRN
Status: DISCONTINUED | OUTPATIENT
Start: 2017-12-19 | End: 2017-12-19 | Stop reason: SURG

## 2017-12-19 RX ADMIN — SODIUM CHLORIDE, SODIUM LACTATE, POTASSIUM CHLORIDE, CALCIUM CHLORIDE: 600; 310; 30; 20 INJECTION, SOLUTION INTRAVENOUS at 15:03:00

## 2017-12-19 RX ADMIN — SODIUM CHLORIDE, SODIUM LACTATE, POTASSIUM CHLORIDE, CALCIUM CHLORIDE: 600; 310; 30; 20 INJECTION, SOLUTION INTRAVENOUS at 15:57:00

## 2017-12-19 RX ADMIN — LIDOCAINE HYDROCHLORIDE 50 MG: 10 INJECTION, SOLUTION EPIDURAL; INFILTRATION; INTRACAUDAL; PERINEURAL at 15:05:00

## 2017-12-19 NOTE — OPERATIVE REPORT
[facility]    PATIENT'S NAME: Rolando Keyur   ATTENDING PHYSICIAN: Thao Castanon MD   OPERATING PHYSICIAN: Thao Castanon MD   PATIENT ACCOUNT#:   [id number]    LOCATION:  [address],[facility] 1 ENDO POOL ROOMS 8 [facility]  MEDICAL [facility] duodenum and slowly withdrawn, and the entire upper GI tract was carefully examined. IMPRESSION:  Mild gastritis, otherwise normal gastroscopy. RECOMMENDATIONS:    1. Check pathology results.   2.   Start omeprazole 20 mg daily empirically for pos

## 2017-12-19 NOTE — BRIEF OP NOTE
Pre-Operative Diagnosis: Early satiety [R68.81]  Bloating [R14.0]  Abdominal cramping [R10.9]  Loose stools [F16.4]  History of Helicobacter pylori infection [Z86.19]     Post-Operative Diagnosis: Normal colonoscopy, internal hemorrhoids; mild gastritis

## 2017-12-19 NOTE — OPERATIVE REPORT
[facility]    PATIENT'S NAME: Mary Stephenie   ATTENDING PHYSICIAN: Guille Morse MD   OPERATING PHYSICIAN: Guille Morse MD   PATIENT ACCOUNT#:   [id number]    LOCATION:  [address],[facility] 1 ENDO POOL ROOMS 8 [facility]  MEDICAL [facility] 15:29:18  t: 12/19/2017 15:56:13  [facility] [id number]  [facility]/

## 2017-12-19 NOTE — ANESTHESIA PREPROCEDURE EVALUATION
Anesthesia PreOp Note    HPI:     Moses Cid is a 44year old female who presents for preoperative consultation requested by: Danisha Gonzales MD    Date of Surgery: 12/19/2017    Procedure(s):  COLONOSCOPY  ESOPHAGOGASTRODUODENOSCOPY (EGD)  I 1 spray by Nasal route 2 (two) times daily. Disp: 1 Bottle Rfl: 3 11/19/2017       No current [facility]-ordered facility-administered medications on file. No current [facility]-ordered outpatient prescriptions on file.     No Known Allergies    Family History   Prob alternative forms of anesthetic management. All of the patient's questions were answered to the best of my ability. The patient desires the anesthetic management as planned.   [facility]  12/19/2017 3:00 PM

## 2017-12-19 NOTE — H&P
History & Physical Examination    Patient Name: Barbie Ervin  MRN: [id number]  [facility]: [id number]  YOB: 1980    Diagnosis: History of H. pylori infection, abdominal cramping, loose stool, early satiety      Prescriptions Prior to Admission:  Pro oz/week     Comment: social       SYSTEM Check if Physical Exam is Normal If not normal, please explain:   MARIA LUZ Hernández  [ Farideh Hess [ Cristo Foote [ Man Rise [ Linus Alexander [ X]      I have discussed the risks and benefits and alternative

## 2017-12-19 NOTE — ANESTHESIA POSTPROCEDURE EVALUATION
Patient: La Almaguer    Procedure Summary     Date:  12/19/17 Room / Location:  [facility] ENDOSCOPY 01 / [facility] ENDO    Anesthesia Start:  7161 Anesthesia Stop:  2693    Procedures:       COLONOSCOPY (N/A )      ESOPHAGOGASTRODUODENOSCOPY (EGD) (N/A ) Alison Escudero

## 2017-12-22 ENCOUNTER — TELEPHONE (OUTPATIENT)
Dept: GASTROENTEROLOGY | Facility: CLINIC | Age: 37
End: 2017-12-22

## 2017-12-22 RX ORDER — OMEPRAZOLE 40 MG/1
40 CAPSULE, DELAYED RELEASE ORAL DAILY
Qty: 14 CAPSULE | Refills: 0 | OUTPATIENT
Start: 2017-12-22 | End: 2017-12-22

## 2017-12-22 RX ORDER — TETRACYCLINE HYDROCHLORIDE 500 MG/1
500 CAPSULE ORAL 4 TIMES DAILY
Qty: 56 CAPSULE | Refills: 0 | OUTPATIENT
Start: 2017-12-22 | End: 2017-12-22

## 2017-12-22 RX ORDER — TETRACYCLINE HYDROCHLORIDE 500 MG/1
500 CAPSULE ORAL 4 TIMES DAILY
Qty: 56 CAPSULE | Refills: 0 | Status: SHIPPED | OUTPATIENT
Start: 2017-12-22 | End: 2017-12-22

## 2017-12-22 RX ORDER — DOXYCYCLINE HYCLATE 100 MG
100 TABLET ORAL 2 TIMES DAILY
Qty: 28 TABLET | Refills: 0 | Status: SHIPPED | OUTPATIENT
Start: 2017-12-22 | End: 2018-09-07 | Stop reason: ALTCHOICE

## 2017-12-22 RX ORDER — OMEPRAZOLE 40 MG/1
CAPSULE, DELAYED RELEASE ORAL
Qty: 14 CAPSULE | Refills: 0 | Status: SHIPPED | OUTPATIENT
Start: 2017-12-22 | End: 2018-09-07 | Stop reason: ALTCHOICE

## 2017-12-22 RX ORDER — METRONIDAZOLE 500 MG/1
500 TABLET ORAL EVERY 8 HOURS
Qty: 42 TABLET | Refills: 0 | Status: SHIPPED | OUTPATIENT
Start: 2017-12-22 | End: 2018-09-07 | Stop reason: ALTCHOICE

## 2017-12-22 NOTE — TELEPHONE ENCOUNTER
Dr Oliverio Fuentes,    See below note, tetracycline no longer available. Please prescribe an alternate.

## 2017-12-22 NOTE — TELEPHONE ENCOUNTER
Letter mailed to pt    Recall colonoscopy entered for 12 years    Tetracycline and omeprazole did not go due to issues with sig    LM for pt to call back to review the following h.  Pylori treatment: 4 medications sent to pharmacy    I reviewed that h.pylor

## 2017-12-22 NOTE — TELEPHONE ENCOUNTER
Please cancel tetracycline order. Replaced with doxycycline, ER X sent.   Please also take tetracycline out of our order sets

## 2017-12-22 NOTE — TELEPHONE ENCOUNTER
Please send letter, recall colonoscopy for age 47, make sure the pharmacy receives the antibiotic prescriptions as requested with full Sig.

## 2017-12-22 NOTE — TELEPHONE ENCOUNTER
[facility] states Tetracycline is not longer avail - requesting alternate meds. Pls call. Thank you.

## 2017-12-22 NOTE — TELEPHONE ENCOUNTER
Pt was notified that doxycycline was sent to her pharmacy.     I discontinued the tetracycline    F/u appt made    Future Appointments  Date Time Provider Demarco Chani   2/20/2018 10:00 AM Gaby Escalona MD [facility]

## 2018-01-04 ENCOUNTER — OFFICE VISIT (OUTPATIENT)
Dept: FAMILY MEDICINE CLINIC | Facility: CLINIC | Age: 38
End: 2018-01-04

## 2018-01-04 VITALS
HEIGHT: 65 IN | BODY MASS INDEX: 29.49 KG/M2 | TEMPERATURE: 100 F | RESPIRATION RATE: 13 BRPM | SYSTOLIC BLOOD PRESSURE: 110 MMHG | DIASTOLIC BLOOD PRESSURE: 70 MMHG | HEART RATE: 86 BPM | OXYGEN SATURATION: 99 % | WEIGHT: 177 LBS

## 2018-01-04 DIAGNOSIS — H69.82 DYSFUNCTION OF LEFT EUSTACHIAN TUBE: ICD-10-CM

## 2018-01-04 DIAGNOSIS — K14.8 TONGUE DISCOLORATION: Primary | ICD-10-CM

## 2018-01-04 PROCEDURE — 99213 OFFICE O/P EST LOW 20 MIN: CPT | Performed by: FAMILY MEDICINE

## 2018-01-04 NOTE — PROGRESS NOTES
HPI:    Patient ID: Laura Mathur is a 44year old female.     Left Ear Pressure  -for about 1wk  -having post-nasal drip, rhinorrhea, intermittent chills  -no congestions, fever      Black Tongue  -For past 1 wk tongue is darkened  -Never had this before  - PHYSICAL EXAM:   Physical Exam   Vitals reviewed. Constitutional: She appears well-developed and well-nourished. No distress. HENT:   Head: Normocephalic and atraumatic.    Right Ear: External ear and ear canal normal. Tympanic membrane is not perforate

## 2018-01-11 ENCOUNTER — TELEPHONE (OUTPATIENT)
Dept: GASTROENTEROLOGY | Facility: CLINIC | Age: 38
End: 2018-01-11

## 2018-01-11 DIAGNOSIS — R11.0 NAUSEA: Primary | ICD-10-CM

## 2018-01-11 NOTE — TELEPHONE ENCOUNTER
Dr. Sivan Merrill, please see message below. Thank you. Pt states completed 14 day abx  For H. Pylori tx on 1/9/18. States was not feeling well during abx treatment. C/o having dizziness, nausea and bloating in between ribcage under bra line 7/10 discomfort.  Sta

## 2018-01-11 NOTE — TELEPHONE ENCOUNTER
Please have patient check CBC and BMP at the laboratory tomorrow. She should drink plenty of fluids. She should go to the emergency room if she develops vomiting, fevers, chills, chest pain or rectal bleeding.   Otherwise, she should come see me Tuesday,

## 2018-01-11 NOTE — TELEPHONE ENCOUNTER
Spoke to pt. Relayed Dr. Mich Kebede message as shown below. Pt verbalized understanding of whole message. Appt scheduled for 1/16/18 at 1130 at [facility]. Pt verbalized agreement to appt scheduled and having lab work done tomorrow 1/12/18.  No furt

## 2018-01-11 NOTE — TELEPHONE ENCOUNTER
Pt stopped finished antibiotics prescribed by [facility] on Tuesday. Pt states she has been experiencing nausea, swollen abdomen and dizziness.  Please osmany thank you [phone]

## 2018-01-12 ENCOUNTER — LAB ENCOUNTER (OUTPATIENT)
Dept: LAB | Facility: HOSPITAL | Age: 38
End: 2018-01-12
Attending: INTERNAL MEDICINE
Payer: COMMERCIAL

## 2018-01-12 DIAGNOSIS — R11.0 NAUSEA: ICD-10-CM

## 2018-01-12 LAB
ANION GAP SERPL CALC-SCNC: 10 MMOL/L (ref 0–18)
BASOPHILS # BLD: 0 K/UL (ref 0–0.2)
BASOPHILS NFR BLD: 1 %
BUN SERPL-MCNC: 7 MG/DL (ref 8–20)
BUN/CREAT SERPL: 10.6 (ref 10–20)
CALCIUM SERPL-MCNC: 9.1 MG/DL (ref 8.5–10.5)
CHLORIDE SERPL-SCNC: 99 MMOL/L (ref 95–110)
CO2 SERPL-SCNC: 27 MMOL/L (ref 22–32)
CREAT SERPL-MCNC: 0.66 MG/DL (ref 0.5–1.5)
EOSINOPHIL # BLD: 0.1 K/UL (ref 0–0.7)
EOSINOPHIL NFR BLD: 1 %
ERYTHROCYTE [DISTWIDTH] IN BLOOD BY AUTOMATED COUNT: 13.5 % (ref 11–15)
GLUCOSE SERPL-MCNC: 94 MG/DL (ref 70–99)
HCT VFR BLD AUTO: 39.8 % (ref 35–48)
HGB BLD-MCNC: 13.2 G/DL (ref 12–16)
LYMPHOCYTES # BLD: 2.3 K/UL (ref 1–4)
LYMPHOCYTES NFR BLD: 37 %
MCH RBC QN AUTO: 30.6 PG (ref 27–32)
MCHC RBC AUTO-ENTMCNC: 33.1 G/DL (ref 32–37)
MCV RBC AUTO: 92.5 FL (ref 80–100)
MONOCYTES # BLD: 0.4 K/UL (ref 0–1)
MONOCYTES NFR BLD: 6 %
NEUTROPHILS # BLD AUTO: 3.5 K/UL (ref 1.8–7.7)
NEUTROPHILS NFR BLD: 55 %
OSMOLALITY UR CALC.SUM OF ELEC: 280 MOSM/KG (ref 275–295)
PLATELET # BLD AUTO: 259 K/UL (ref 140–400)
PMV BLD AUTO: 9.2 FL (ref 7.4–10.3)
POTASSIUM SERPL-SCNC: 3.5 MMOL/L (ref 3.3–5.1)
RBC # BLD AUTO: 4.31 M/UL (ref 3.7–5.4)
SODIUM SERPL-SCNC: 136 MMOL/L (ref 136–144)
WBC # BLD AUTO: 6.3 K/UL (ref 4–11)

## 2018-01-12 PROCEDURE — 80048 BASIC METABOLIC PNL TOTAL CA: CPT

## 2018-01-12 PROCEDURE — 36415 COLL VENOUS BLD VENIPUNCTURE: CPT

## 2018-01-12 PROCEDURE — 85025 COMPLETE CBC W/AUTO DIFF WBC: CPT

## 2018-01-15 ENCOUNTER — TELEPHONE (OUTPATIENT)
Dept: GASTROENTEROLOGY | Facility: CLINIC | Age: 38
End: 2018-01-15

## 2018-01-16 ENCOUNTER — OFFICE VISIT (OUTPATIENT)
Dept: GASTROENTEROLOGY | Facility: CLINIC | Age: 38
End: 2018-01-16

## 2018-01-16 VITALS
BODY MASS INDEX: 28.97 KG/M2 | HEIGHT: 65.5 IN | SYSTOLIC BLOOD PRESSURE: 109 MMHG | WEIGHT: 176 LBS | HEART RATE: 107 BPM | DIASTOLIC BLOOD PRESSURE: 71 MMHG

## 2018-01-16 DIAGNOSIS — R11.0 CHRONIC NAUSEA: Primary | ICD-10-CM

## 2018-01-16 DIAGNOSIS — Z86.19 HISTORY OF HELICOBACTER PYLORI INFECTION: ICD-10-CM

## 2018-01-16 DIAGNOSIS — R14.0 ABDOMINAL BLOATING: ICD-10-CM

## 2018-01-16 DIAGNOSIS — R10.13 DYSPEPSIA: ICD-10-CM

## 2018-01-16 PROCEDURE — 99214 OFFICE O/P EST MOD 30 MIN: CPT | Performed by: INTERNAL MEDICINE

## 2018-01-16 RX ORDER — PROCHLORPERAZINE MALEATE 10 MG
10 TABLET ORAL EVERY 6 HOURS PRN
Qty: 60 TABLET | Refills: 0 | Status: SHIPPED | OUTPATIENT
Start: 2018-01-16 | End: 2018-09-07 | Stop reason: ALTCHOICE

## 2018-01-16 NOTE — PROGRESS NOTES
HPI:    Patient ID: Leonie Shah is a 44year old female. HPI    Review of Systems   Constitutional: Positive for unexpected weight change. Negative for activity change, appetite change, chills, diaphoresis, fatigue and fever.         States she has gain Sodium 10 MG Oral Tab Take 1 tablet (10 mg total) by mouth nightly.  Disp: 30 tablet Rfl: 3   bismuth subsalicylate 788 MG Oral Tab Take 2 tablets 4 times daily for 14 days in combination with omeprazole, tetracycline, and metronidazole Disp: 112 tablet Rfl adenopathy. Neurological: She is alert and oriented to person, place, and time. Skin: Skin is warm and dry. No rash noted. She is not diaphoretic. No erythema. No pallor. Psychiatric: She has a normal mood and affect.  Her behavior is normal. Judgment

## 2018-01-16 NOTE — H&P
History of present illness: This is a 42-year-old female who recently underwent extensive GI workup. The patient was originally seen by Gale Shannon PA-C in the office on November 22 with multiple complaints of chronic nausea and bloating.   He also had d . Assessment and plan: This is a 18-year-old female with chronic nausea, dyspepsia and bloating. Some of her symptoms are moderately improved since treatment for H. pylori but not completely.   Contributing factors may include caffeine, over-t

## 2018-01-24 ENCOUNTER — TELEPHONE (OUTPATIENT)
Dept: OBGYN CLINIC | Facility: CLINIC | Age: 38
End: 2018-01-24

## 2018-02-05 ENCOUNTER — OFFICE VISIT (OUTPATIENT)
Dept: OTOLARYNGOLOGY | Facility: CLINIC | Age: 38
End: 2018-02-05

## 2018-02-05 VITALS
WEIGHT: 175 LBS | HEIGHT: 65 IN | SYSTOLIC BLOOD PRESSURE: 110 MMHG | BODY MASS INDEX: 29.16 KG/M2 | TEMPERATURE: 99 F | DIASTOLIC BLOOD PRESSURE: 84 MMHG

## 2018-02-05 DIAGNOSIS — M26.623 BILATERAL TEMPOROMANDIBULAR JOINT PAIN: Primary | ICD-10-CM

## 2018-02-05 DIAGNOSIS — J34.2 DEVIATED SEPTUM: ICD-10-CM

## 2018-02-05 PROCEDURE — 99213 OFFICE O/P EST LOW 20 MIN: CPT | Performed by: OTOLARYNGOLOGY

## 2018-02-05 PROCEDURE — 99212 OFFICE O/P EST SF 10 MIN: CPT | Performed by: OTOLARYNGOLOGY

## 2018-02-06 NOTE — PROGRESS NOTES
Chevy Isbell is a 44year old female.  Patient presents with:  Ear Problem: patient reports pressure in both ears and sharp pain for 1 month    HPI:   She continues to experience problems with facial pressure with postnasal thick drainage as well as pressur denies fever, chills, sweats, weight loss, weight gain  SKIN: denies any unusual skin lesions or rashes  RESPIRATORY: denies shortness of breath with exertion  NEURO: denies headaches    EXAM:   /84 (BP Location: Left arm, Patient Position: Sitting, steroids, antihistamines. If her problems continue, I would recommend that we consider a CT of her sinuses      The patient indicates understanding of these issues and agrees to the plan. Lev Crane MD  2/6/2018  6:18 AM

## 2018-08-08 ENCOUNTER — OFFICE VISIT (OUTPATIENT)
Dept: INTERNAL MEDICINE CLINIC | Facility: CLINIC | Age: 38
End: 2018-08-08
Payer: COMMERCIAL

## 2018-08-08 VITALS
DIASTOLIC BLOOD PRESSURE: 62 MMHG | HEIGHT: 65 IN | SYSTOLIC BLOOD PRESSURE: 110 MMHG | WEIGHT: 176 LBS | OXYGEN SATURATION: 100 % | BODY MASS INDEX: 29.32 KG/M2 | RESPIRATION RATE: 20 BRPM | HEART RATE: 65 BPM | TEMPERATURE: 98 F

## 2018-08-08 DIAGNOSIS — Z00.00 WELLNESS EXAMINATION: Primary | ICD-10-CM

## 2018-08-08 LAB
ALBUMIN SERPL BCP-MCNC: 4.1 G/DL (ref 3.5–4.8)
ALBUMIN/GLOB SERPL: 1.5 {RATIO} (ref 1–2)
ALP SERPL-CCNC: 43 U/L (ref 32–100)
ALT SERPL-CCNC: 14 U/L (ref 14–54)
ANION GAP SERPL CALC-SCNC: 6 MMOL/L (ref 0–18)
AST SERPL-CCNC: 17 U/L (ref 15–41)
BILIRUB SERPL-MCNC: 0.5 MG/DL (ref 0.3–1.2)
BUN SERPL-MCNC: 6 MG/DL (ref 8–20)
BUN/CREAT SERPL: 8.1 (ref 10–20)
CALCIUM SERPL-MCNC: 9.1 MG/DL (ref 8.5–10.5)
CHLORIDE SERPL-SCNC: 104 MMOL/L (ref 95–110)
CHOLEST SERPL-MCNC: 209 MG/DL (ref 110–200)
CO2 SERPL-SCNC: 27 MMOL/L (ref 22–32)
CREAT SERPL-MCNC: 0.74 MG/DL (ref 0.5–1.5)
GLOBULIN PLAS-MCNC: 2.7 G/DL (ref 2.5–3.7)
GLUCOSE SERPL-MCNC: 93 MG/DL (ref 70–99)
HDLC SERPL-MCNC: 60 MG/DL
LDLC SERPL CALC-MCNC: 119 MG/DL (ref 0–99)
NONHDLC SERPL-MCNC: 149 MG/DL
OSMOLALITY UR CALC.SUM OF ELEC: 281 MOSM/KG (ref 275–295)
PATIENT FASTING: YES
POTASSIUM SERPL-SCNC: 4 MMOL/L (ref 3.3–5.1)
PROT SERPL-MCNC: 6.8 G/DL (ref 5.9–8.4)
SODIUM SERPL-SCNC: 137 MMOL/L (ref 136–144)
TRIGL SERPL-MCNC: 152 MG/DL (ref 1–149)

## 2018-08-08 PROCEDURE — 99395 PREV VISIT EST AGE 18-39: CPT | Performed by: INTERNAL MEDICINE

## 2018-08-08 PROCEDURE — 80053 COMPREHEN METABOLIC PANEL: CPT | Performed by: INTERNAL MEDICINE

## 2018-08-08 PROCEDURE — 80061 LIPID PANEL: CPT | Performed by: INTERNAL MEDICINE

## 2018-08-08 RX ORDER — CYCLOBENZAPRINE HCL 10 MG
10 TABLET ORAL 3 TIMES DAILY PRN
Qty: 30 TABLET | Refills: 0 | Status: SHIPPED | OUTPATIENT
Start: 2018-08-08 | End: 2018-09-07 | Stop reason: ALTCHOICE

## 2018-08-08 NOTE — PROGRESS NOTES
HPI:    Patient ID: Deidra Medina is a 44year old female. Pt here for evaluation of fasting blood work up. Has some concerns due to a family member having issues with it,  Last check was 80 with a good  Ratio.     Review of Systems   Constitutional: Ne Hyclate 100 MG Oral Tab Take 1 tablet (100 mg total) by mouth 2 (two) times daily. Disp: 28 tablet Rfl: 0   Ondansetron HCl (ZOFRAN) 4 mg tablet Take 1 tablet (4 mg total) by mouth every 8 (eight) hours as needed for Nausea.  Disp: 30 tablet Rfl: 0   LYRICA

## 2018-08-31 ENCOUNTER — OFFICE VISIT (OUTPATIENT)
Dept: OTOLARYNGOLOGY | Facility: CLINIC | Age: 38
End: 2018-08-31
Payer: COMMERCIAL

## 2018-08-31 VITALS
WEIGHT: 176 LBS | TEMPERATURE: 99 F | DIASTOLIC BLOOD PRESSURE: 80 MMHG | BODY MASS INDEX: 29.32 KG/M2 | HEIGHT: 65 IN | SYSTOLIC BLOOD PRESSURE: 106 MMHG

## 2018-08-31 DIAGNOSIS — J34.2 DEVIATED SEPTUM: Primary | ICD-10-CM

## 2018-08-31 PROCEDURE — 99213 OFFICE O/P EST LOW 20 MIN: CPT | Performed by: OTOLARYNGOLOGY

## 2018-08-31 PROCEDURE — 99212 OFFICE O/P EST SF 10 MIN: CPT | Performed by: OTOLARYNGOLOGY

## 2018-08-31 RX ORDER — AZELASTINE HYDROCHLORIDE AND FLUTICASONE PROPIONATE 137; 50 UG/1; UG/1
SPRAY, METERED NASAL
COMMUNITY
Start: 2018-07-02 | End: 2018-09-07 | Stop reason: ALTCHOICE

## 2018-08-31 NOTE — PROGRESS NOTES
Deidra Medina is a 44year old female. Patient presents with:  Sinus Problem: sinus pressure, post nasal drip for a while, CT sinus done july 2018    HPI:   She continues to experience problems with frequent sinus infections and pressure in her head.   She h 12/19/2017   • Internal hemorrhoids 12/19/2017   • Muscle weakness       Social History:  Smoking status: Never Smoker                                                              Smokeless tobacco: Never Used                      Alcohol use:  Yes maxillary sinus surgery for her. I did discuss the possibility that she will continue to have problems with sinus pressure. Procedure, risks, alternatives, implications discussed. She understands and would like to proceed.       The patient indicates und

## 2018-09-04 ENCOUNTER — TELEPHONE (OUTPATIENT)
Dept: OTOLARYNGOLOGY | Facility: CLINIC | Age: 38
End: 2018-09-04

## 2018-09-04 NOTE — TELEPHONE ENCOUNTER
Pt saw  on 18 for consult    Pt dropped of CD of CT of SInus done at [facility] and written report for  to review\    Disc and report put on  desk to review/

## 2018-09-07 ENCOUNTER — TELEPHONE (OUTPATIENT)
Dept: OTOLARYNGOLOGY | Facility: CLINIC | Age: 38
End: 2018-09-07

## 2018-09-07 NOTE — TELEPHONE ENCOUNTER
Please let her know that I was able to review her CT scan.   I believe the findings are consistent with what we discussed and we should plan to proceed as we discussed in the office

## 2018-09-07 NOTE — TELEPHONE ENCOUNTER
Spoke to pt, relayed Dr. Quezada Grew message as shown below. Pt verbalized understanding of whole message and had no further questions at this time.

## 2018-09-12 ENCOUNTER — ANESTHESIA EVENT (OUTPATIENT)
Dept: SURGERY | Facility: HOSPITAL | Age: 38
End: 2018-09-12
Payer: COMMERCIAL

## 2018-09-12 ENCOUNTER — ANESTHESIA (OUTPATIENT)
Dept: SURGERY | Facility: HOSPITAL | Age: 38
End: 2018-09-12
Payer: COMMERCIAL

## 2018-09-12 ENCOUNTER — HOSPITAL ENCOUNTER (OUTPATIENT)
Facility: HOSPITAL | Age: 38
Setting detail: HOSPITAL OUTPATIENT SURGERY
Discharge: HOME | End: 2018-09-12
Attending: OTOLARYNGOLOGY | Admitting: OTOLARYNGOLOGY
Payer: COMMERCIAL

## 2018-09-12 ENCOUNTER — TELEPHONE (OUTPATIENT)
Dept: OTOLARYNGOLOGY | Facility: CLINIC | Age: 38
End: 2018-09-12

## 2018-09-12 VITALS
BODY MASS INDEX: 29.49 KG/M2 | TEMPERATURE: 98 F | SYSTOLIC BLOOD PRESSURE: 119 MMHG | OXYGEN SATURATION: 100 % | WEIGHT: 177 LBS | DIASTOLIC BLOOD PRESSURE: 70 MMHG | RESPIRATION RATE: 16 BRPM | HEIGHT: 65 IN | HEART RATE: 77 BPM

## 2018-09-12 DIAGNOSIS — J34.2 DEVIATED NASAL SEPTUM: ICD-10-CM

## 2018-09-12 DIAGNOSIS — J32.0 CHRONIC MAXILLARY SINUSITIS: ICD-10-CM

## 2018-09-12 DIAGNOSIS — J32.2 CHRONIC ETHMOIDAL SINUSITIS: ICD-10-CM

## 2018-09-12 DIAGNOSIS — J34.3 HYPERTROPHY OF NASAL TURBINATES: ICD-10-CM

## 2018-09-12 LAB — B-HCG UR QL: NEGATIVE

## 2018-09-12 PROCEDURE — 099Q8ZZ DRAINAGE OF RIGHT MAXILLARY SINUS, VIA NATURAL OR ARTIFICIAL OPENING ENDOSCOPIC: ICD-10-PCS | Performed by: OTOLARYNGOLOGY

## 2018-09-12 PROCEDURE — 30520 REPAIR OF NASAL SEPTUM: CPT | Performed by: OTOLARYNGOLOGY

## 2018-09-12 PROCEDURE — 09SM0ZZ REPOSITION NASAL SEPTUM, OPEN APPROACH: ICD-10-PCS | Performed by: OTOLARYNGOLOGY

## 2018-09-12 PROCEDURE — 099R8ZZ DRAINAGE OF LEFT MAXILLARY SINUS, VIA NATURAL OR ARTIFICIAL OPENING ENDOSCOPIC: ICD-10-PCS | Performed by: OTOLARYNGOLOGY

## 2018-09-12 PROCEDURE — 09BV8ZZ EXCISION OF LEFT ETHMOID SINUS, VIA NATURAL OR ARTIFICIAL OPENING ENDOSCOPIC: ICD-10-PCS | Performed by: OTOLARYNGOLOGY

## 2018-09-12 PROCEDURE — 31267 ENDOSCOPY MAXILLARY SINUS: CPT | Performed by: OTOLARYNGOLOGY

## 2018-09-12 PROCEDURE — 09BU8ZZ EXCISION OF RIGHT ETHMOID SINUS, VIA NATURAL OR ARTIFICIAL OPENING ENDOSCOPIC: ICD-10-PCS | Performed by: OTOLARYNGOLOGY

## 2018-09-12 PROCEDURE — 31255 NSL/SINS NDSC W/TOT ETHMDCT: CPT | Performed by: OTOLARYNGOLOGY

## 2018-09-12 PROCEDURE — 30140 RESECT INFERIOR TURBINATE: CPT | Performed by: OTOLARYNGOLOGY

## 2018-09-12 RX ORDER — ONDANSETRON 2 MG/ML
4 INJECTION INTRAMUSCULAR; INTRAVENOUS ONCE AS NEEDED
Status: DISCONTINUED | OUTPATIENT
Start: 2018-09-12 | End: 2018-09-12

## 2018-09-12 RX ORDER — MORPHINE SULFATE 4 MG/ML
1 INJECTION, SOLUTION INTRAMUSCULAR; INTRAVENOUS EVERY 2 HOUR PRN
Status: DISCONTINUED | OUTPATIENT
Start: 2018-09-12 | End: 2018-09-12

## 2018-09-12 RX ORDER — HYDROCODONE BITARTRATE AND ACETAMINOPHEN 5; 325 MG/1; MG/1
1 TABLET ORAL AS NEEDED
Status: DISCONTINUED | OUTPATIENT
Start: 2018-09-12 | End: 2018-09-12

## 2018-09-12 RX ORDER — MORPHINE SULFATE 4 MG/ML
2 INJECTION, SOLUTION INTRAMUSCULAR; INTRAVENOUS EVERY 2 HOUR PRN
Status: DISCONTINUED | OUTPATIENT
Start: 2018-09-12 | End: 2018-09-12

## 2018-09-12 RX ORDER — MORPHINE SULFATE 4 MG/ML
4 INJECTION, SOLUTION INTRAMUSCULAR; INTRAVENOUS EVERY 10 MIN PRN
Status: DISCONTINUED | OUTPATIENT
Start: 2018-09-12 | End: 2018-09-12

## 2018-09-12 RX ORDER — NALOXONE HYDROCHLORIDE 0.4 MG/ML
80 INJECTION, SOLUTION INTRAMUSCULAR; INTRAVENOUS; SUBCUTANEOUS AS NEEDED
Status: DISCONTINUED | OUTPATIENT
Start: 2018-09-12 | End: 2018-09-12

## 2018-09-12 RX ORDER — SODIUM CHLORIDE 0.9 % (FLUSH) 0.9 %
10 SYRINGE (ML) INJECTION AS NEEDED
Status: DISCONTINUED | OUTPATIENT
Start: 2018-09-12 | End: 2018-09-12

## 2018-09-12 RX ORDER — SODIUM CHLORIDE, SODIUM LACTATE, POTASSIUM CHLORIDE, CALCIUM CHLORIDE 600; 310; 30; 20 MG/100ML; MG/100ML; MG/100ML; MG/100ML
INJECTION, SOLUTION INTRAVENOUS CONTINUOUS
Status: DISCONTINUED | OUTPATIENT
Start: 2018-09-12 | End: 2018-09-12

## 2018-09-12 RX ORDER — LIDOCAINE HYDROCHLORIDE 10 MG/ML
INJECTION, SOLUTION EPIDURAL; INFILTRATION; INTRACAUDAL; PERINEURAL AS NEEDED
Status: DISCONTINUED | OUTPATIENT
Start: 2018-09-12 | End: 2018-09-12 | Stop reason: SURG

## 2018-09-12 RX ORDER — METOCLOPRAMIDE 10 MG/1
10 TABLET ORAL ONCE
Status: DISCONTINUED | OUTPATIENT
Start: 2018-09-12 | End: 2018-09-12 | Stop reason: HOSPADM

## 2018-09-12 RX ORDER — DEXAMETHASONE SODIUM PHOSPHATE 4 MG/ML
VIAL (ML) INJECTION AS NEEDED
Status: DISCONTINUED | OUTPATIENT
Start: 2018-09-12 | End: 2018-09-12 | Stop reason: SURG

## 2018-09-12 RX ORDER — ONDANSETRON 4 MG/1
4 TABLET, ORALLY DISINTEGRATING ORAL EVERY 6 HOURS PRN
Status: DISCONTINUED | OUTPATIENT
Start: 2018-09-12 | End: 2018-09-12

## 2018-09-12 RX ORDER — HALOPERIDOL 5 MG/ML
0.25 INJECTION INTRAMUSCULAR ONCE AS NEEDED
Status: DISCONTINUED | OUTPATIENT
Start: 2018-09-12 | End: 2018-09-12

## 2018-09-12 RX ORDER — ONDANSETRON 2 MG/ML
INJECTION INTRAMUSCULAR; INTRAVENOUS AS NEEDED
Status: DISCONTINUED | OUTPATIENT
Start: 2018-09-12 | End: 2018-09-12 | Stop reason: SURG

## 2018-09-12 RX ORDER — HYDROCODONE BITARTRATE AND ACETAMINOPHEN 5; 325 MG/1; MG/1
1 TABLET ORAL EVERY 4 HOURS PRN
Status: DISCONTINUED | OUTPATIENT
Start: 2018-09-12 | End: 2018-09-12

## 2018-09-12 RX ORDER — HYDROCODONE BITARTRATE AND ACETAMINOPHEN 5; 325 MG/1; MG/1
TABLET ORAL
Qty: 20 TABLET | Refills: 0 | Status: SHIPPED | OUTPATIENT
Start: 2018-09-12 | End: 2018-12-03

## 2018-09-12 RX ORDER — CEPHALEXIN 500 MG/1
500 CAPSULE ORAL EVERY 8 HOURS
Qty: 21 CAPSULE | Refills: 0 | Status: SHIPPED | OUTPATIENT
Start: 2018-09-12 | End: 2018-12-03

## 2018-09-12 RX ORDER — MORPHINE SULFATE 10 MG/ML
6 INJECTION, SOLUTION INTRAMUSCULAR; INTRAVENOUS EVERY 10 MIN PRN
Status: DISCONTINUED | OUTPATIENT
Start: 2018-09-12 | End: 2018-09-12

## 2018-09-12 RX ORDER — EPHEDRINE SULFATE 50 MG/ML
INJECTION, SOLUTION INTRAVENOUS AS NEEDED
Status: DISCONTINUED | OUTPATIENT
Start: 2018-09-12 | End: 2018-09-12 | Stop reason: SURG

## 2018-09-12 RX ORDER — MORPHINE SULFATE 4 MG/ML
2 INJECTION, SOLUTION INTRAMUSCULAR; INTRAVENOUS EVERY 10 MIN PRN
Status: DISCONTINUED | OUTPATIENT
Start: 2018-09-12 | End: 2018-09-12

## 2018-09-12 RX ORDER — HYDROCODONE BITARTRATE AND ACETAMINOPHEN 5; 325 MG/1; MG/1
2 TABLET ORAL AS NEEDED
Status: DISCONTINUED | OUTPATIENT
Start: 2018-09-12 | End: 2018-09-12

## 2018-09-12 RX ORDER — ACETAMINOPHEN 500 MG
1000 TABLET ORAL ONCE
Status: COMPLETED | OUTPATIENT
Start: 2018-09-12 | End: 2018-09-12

## 2018-09-12 RX ORDER — ONDANSETRON 2 MG/ML
4 INJECTION INTRAMUSCULAR; INTRAVENOUS EVERY 6 HOURS PRN
Status: DISCONTINUED | OUTPATIENT
Start: 2018-09-12 | End: 2018-09-12

## 2018-09-12 RX ORDER — LIDOCAINE HYDROCHLORIDE AND EPINEPHRINE 10; 10 MG/ML; UG/ML
INJECTION, SOLUTION INFILTRATION; PERINEURAL AS NEEDED
Status: DISCONTINUED | OUTPATIENT
Start: 2018-09-12 | End: 2018-09-12 | Stop reason: HOSPADM

## 2018-09-12 RX ORDER — MORPHINE SULFATE 4 MG/ML
1.5 INJECTION, SOLUTION INTRAMUSCULAR; INTRAVENOUS EVERY 2 HOUR PRN
Status: DISCONTINUED | OUTPATIENT
Start: 2018-09-12 | End: 2018-09-12

## 2018-09-12 RX ORDER — FAMOTIDINE 20 MG/1
20 TABLET ORAL ONCE
Status: DISCONTINUED | OUTPATIENT
Start: 2018-09-12 | End: 2018-09-12 | Stop reason: HOSPADM

## 2018-09-12 RX ORDER — DIAPER,BRIEF,INFANT-TODD,DISP
EACH MISCELLANEOUS AS NEEDED
Status: DISCONTINUED | OUTPATIENT
Start: 2018-09-12 | End: 2018-09-12 | Stop reason: HOSPADM

## 2018-09-12 RX ADMIN — LIDOCAINE HYDROCHLORIDE 40 MG: 10 INJECTION, SOLUTION EPIDURAL; INFILTRATION; INTRACAUDAL; PERINEURAL at 10:37:00

## 2018-09-12 RX ADMIN — EPHEDRINE SULFATE 10 MG: 50 INJECTION, SOLUTION INTRAVENOUS at 10:50:00

## 2018-09-12 RX ADMIN — EPHEDRINE SULFATE 10 MG: 50 INJECTION, SOLUTION INTRAVENOUS at 10:55:00

## 2018-09-12 RX ADMIN — DEXAMETHASONE SODIUM PHOSPHATE 4 MG: 4 MG/ML VIAL (ML) INJECTION at 10:45:00

## 2018-09-12 RX ADMIN — SODIUM CHLORIDE, SODIUM LACTATE, POTASSIUM CHLORIDE, CALCIUM CHLORIDE: 600; 310; 30; 20 INJECTION, SOLUTION INTRAVENOUS at 10:34:00

## 2018-09-12 RX ADMIN — SODIUM CHLORIDE, SODIUM LACTATE, POTASSIUM CHLORIDE, CALCIUM CHLORIDE: 600; 310; 30; 20 INJECTION, SOLUTION INTRAVENOUS at 11:14:00

## 2018-09-12 RX ADMIN — SODIUM CHLORIDE, SODIUM LACTATE, POTASSIUM CHLORIDE, CALCIUM CHLORIDE: 600; 310; 30; 20 INJECTION, SOLUTION INTRAVENOUS at 10:45:00

## 2018-09-12 RX ADMIN — ONDANSETRON 4 MG: 2 INJECTION INTRAMUSCULAR; INTRAVENOUS at 11:12:00

## 2018-09-12 NOTE — BRIEF OP NOTE
Pre-Operative Diagnosis: Deviated nasal septum [J34.2]  Chronic maxillary sinusitis [J32.0]  Chronic ethmoidal sinusitis [J32.2]  Hypertrophy of nasal turbinates [J34.3]     Post-Operative Diagnosis: Deviated nasal septum [J81. 2]Chronic maxillary sinusitis

## 2018-09-12 NOTE — INTERVAL H&P NOTE
Pre-op Diagnosis: Deviated nasal septum [J34.2]  Chronic maxillary sinusitis [J32.0]  Chronic ethmoidal sinusitis [J32.2]  Hypertrophy of nasal turbinates [J34.3]    The above referenced H&P was reviewed by No Baldwin.  Yoseph Meadows MD on 9/12/2018, the patient was

## 2018-09-12 NOTE — ANESTHESIA PROCEDURE NOTES
ANESTHESIA INTUBATION  Urgency: elective    Airway not difficult    General Information and Staff    Patient location during procedure: OR  Anesthesiologist: Ashley Hennessy DO  Performed: anesthesiologist     Indications and Patient Condition  Indications

## 2018-09-12 NOTE — OPERATIVE REPORT
[facility]    PATIENT'S NAME: Theo Mendoza   ATTENDING PHYSICIAN: Lev Kelly MD   OPERATING PHYSICIAN: Lev Kelly MD   PATIENT ACCOUNT#:   [id number]    LOCATION:  [facility] [address] PACU 6 [facility] 10  MEDICAL RECORD #:   [id number]       DATE OF bilaterally with a Coblator. The turbinates were then outfractured. A 0-degree endoscope was used to visualize the nasal cavity on the right side. The middle turbinate was medialized.   A microdebrider was used to resect a portion of the uncinate proce

## 2018-09-12 NOTE — H&P
John Carreon is a 44year old female. Patient presents with:  Sinus Problem: sinus pressure, post nasal drip for a while, CT sinus done july 2018     HPI:   She continues to experience problems with frequent sinus infections and pressure in her head.   She Gastritis 12/19/2017   • Internal hemorrhoids 12/19/2017   • Muscle weakness         Social History:  Smoking status: Never Smoker                                                               Smokeless tobacco: Never Used                      Alcohol use: ethmoid and maxillary sinus surgery for her. I did discuss the possibility that she will continue to have problems with sinus pressure. Procedure, risks, alternatives, implications discussed.   She understands and would like to proceed.        The patient

## 2018-09-12 NOTE — TELEPHONE ENCOUNTER
pt called. Had surgery today with DELICIA. To have a post op in one week.  is on vacation. pt states she is moving out of state next weekend and DELICIA is aware of this. Will it be OK to follow up with PIOTR? pt states she does not want to see TERESA Brown

## 2018-09-12 NOTE — ANESTHESIA POSTPROCEDURE EVALUATION
Patient: Michelle Tillman    Procedure Summary     Date:  09/12/18 Room / Location:  [address] [place] [address] MAIN OR 03 / [address] [place] [address] MAIN OR    Anesthesia Start:  1034 Anesthesia Stop:  7918    Procedure:  NASAL SEPTOPLASTY BILAT SINUS ENDOSCOPY ETHM MAX (Bilateral Nose) Diagnosis:

## 2018-09-13 NOTE — TELEPHONE ENCOUNTER
Pt is post op day 1 endo maxillary with tissue removal, endo total ethmoidectomy, septoplasty, SMR. Per  Pt pt is doing well no bleeding, advised pt no bending or heavy lifting for the next week and pt is not to blow nose until seen by .  Advised pt she

## 2018-09-18 ENCOUNTER — OFFICE VISIT (OUTPATIENT)
Dept: OTOLARYNGOLOGY | Facility: CLINIC | Age: 38
End: 2018-09-18
Payer: COMMERCIAL

## 2018-09-18 VITALS
DIASTOLIC BLOOD PRESSURE: 70 MMHG | WEIGHT: 176 LBS | SYSTOLIC BLOOD PRESSURE: 106 MMHG | HEIGHT: 65 IN | BODY MASS INDEX: 29.32 KG/M2 | TEMPERATURE: 98 F

## 2018-09-18 DIAGNOSIS — J34.2 DEVIATED SEPTUM: Primary | ICD-10-CM

## 2018-09-18 PROCEDURE — 99024 POSTOP FOLLOW-UP VISIT: CPT | Performed by: OTOLARYNGOLOGY

## 2018-09-18 PROCEDURE — 99212 OFFICE O/P EST SF 10 MIN: CPT | Performed by: OTOLARYNGOLOGY

## 2018-09-18 NOTE — PROGRESS NOTES
She is in follow-up of a septoplasty and sinus surgery. Though she still congested but otherwise doing very well    Exam: Splints removed.   Nasal passages debrided of material.  Airway patent bilaterally      Assessment/plan:  Doing very well following he

## 2018-11-21 ENCOUNTER — OFFICE VISIT (OUTPATIENT)
Dept: OTOLARYNGOLOGY | Facility: CLINIC | Age: 38
End: 2018-11-21
Payer: COMMERCIAL

## 2018-11-21 VITALS
BODY MASS INDEX: 28.81 KG/M2 | WEIGHT: 175 LBS | DIASTOLIC BLOOD PRESSURE: 76 MMHG | TEMPERATURE: 98 F | HEIGHT: 65.5 IN | SYSTOLIC BLOOD PRESSURE: 112 MMHG

## 2018-11-21 DIAGNOSIS — J33.9 NASAL POLYPS: Primary | ICD-10-CM

## 2018-11-21 PROCEDURE — 99212 OFFICE O/P EST SF 10 MIN: CPT | Performed by: OTOLARYNGOLOGY

## 2018-11-21 PROCEDURE — 99213 OFFICE O/P EST LOW 20 MIN: CPT | Performed by: OTOLARYNGOLOGY

## 2018-11-21 RX ORDER — FLUTICASONE PROPIONATE 50 MCG
SPRAY, SUSPENSION (ML) NASAL DAILY
COMMUNITY
End: 2019-06-28

## 2018-11-21 NOTE — PROGRESS NOTES
Margaret Marino is a 43year old female.   Patient presents with:  Sinus Problem: sinus surgery/septoplasty done on 9/12/18, LOV with  on 9/12/18- pt c/o post nasal drip, mucus phlegm, congestion for 3 weeks      HISTORY OF PRESENT ILLNESS   11/21/20 MAIN OR   • REPAIR OF NASAL SEPTUM  09/12/2018   • UPPER GI ENDOSCOPY,EXAM           REVIEW OF SYSTEMS    System Neg/Pos Details   Constitutional Negative Fatigue, fever and weight loss.    ENMT Negative Headaches and neck pain   Eyes Negative Blurred visio improvement of symptoms after removal of this       Current Outpatient Medications:   •  Fluticasone Propionate 50 MCG/ACT Nasal Suspension, by Each Nare route daily. , Disp: , Rfl:   •  Montelukast Sodium 10 MG Oral Tab, Take 1 tablet (10 mg total) by mout

## 2018-12-03 ENCOUNTER — OFFICE VISIT (OUTPATIENT)
Dept: FAMILY MEDICINE CLINIC | Facility: CLINIC | Age: 38
End: 2018-12-03
Payer: COMMERCIAL

## 2018-12-03 VITALS
TEMPERATURE: 99 F | OXYGEN SATURATION: 99 % | HEIGHT: 65 IN | BODY MASS INDEX: 29.82 KG/M2 | WEIGHT: 179 LBS | DIASTOLIC BLOOD PRESSURE: 70 MMHG | SYSTOLIC BLOOD PRESSURE: 110 MMHG | RESPIRATION RATE: 13 BRPM | HEART RATE: 80 BPM

## 2018-12-03 DIAGNOSIS — Z00.00 HEALTHCARE MAINTENANCE: ICD-10-CM

## 2018-12-03 DIAGNOSIS — R68.2 DRY MOUTH: Primary | ICD-10-CM

## 2018-12-03 DIAGNOSIS — M25.50 POLYARTHRALGIA: ICD-10-CM

## 2018-12-03 DIAGNOSIS — E78.5 HYPERLIPIDEMIA, UNSPECIFIED HYPERLIPIDEMIA TYPE: ICD-10-CM

## 2018-12-03 DIAGNOSIS — J02.9 PHARYNGITIS, UNSPECIFIED ETIOLOGY: ICD-10-CM

## 2018-12-03 PROCEDURE — 87880 STREP A ASSAY W/OPTIC: CPT | Performed by: FAMILY MEDICINE

## 2018-12-03 PROCEDURE — 84443 ASSAY THYROID STIM HORMONE: CPT | Performed by: FAMILY MEDICINE

## 2018-12-03 PROCEDURE — 99213 OFFICE O/P EST LOW 20 MIN: CPT | Performed by: FAMILY MEDICINE

## 2018-12-03 PROCEDURE — 86038 ANTINUCLEAR ANTIBODIES: CPT | Performed by: FAMILY MEDICINE

## 2018-12-03 PROCEDURE — 80061 LIPID PANEL: CPT | Performed by: FAMILY MEDICINE

## 2018-12-03 PROCEDURE — 36415 COLL VENOUS BLD VENIPUNCTURE: CPT | Performed by: FAMILY MEDICINE

## 2018-12-03 RX ORDER — AZITHROMYCIN 250 MG/1
TABLET, FILM COATED ORAL
Qty: 6 TABLET | Refills: 0 | Status: SHIPPED | OUTPATIENT
Start: 2018-12-03 | End: 2019-06-28 | Stop reason: ALTCHOICE

## 2018-12-03 RX ORDER — METHYLPREDNISOLONE 4 MG/1
TABLET ORAL
Qty: 1 KIT | Refills: 0 | Status: SHIPPED | OUTPATIENT
Start: 2018-12-03 | End: 2019-06-28 | Stop reason: ALTCHOICE

## 2018-12-04 PROBLEM — E78.5 HYPERLIPIDEMIA: Status: ACTIVE | Noted: 2018-12-04

## 2018-12-05 NOTE — PROGRESS NOTES
HPI:   Patient presents with:  Sore Throat: c/c sore throat on and off x1 month      Opal Ervin is a 43year old female presenting for:  SORE THROAT  -for past 2 months having persistent sore throat  -having daily discomfort  -lots of postnasal drip today, then one daily. Disp: 6 tablet Rfl: 0   methylPREDNISolone (MEDROL) 4 MG Oral Tablet Therapy Pack As directed. Disp: 1 kit Rfl: 0   Fluticasone Propionate 50 MCG/ACT Nasal Suspension by Each Nare route daily.  Disp:  Rfl:    Montelukast Sodium 10 MG Musculoskeletal: Positive for arthralgias. Skin: Negative for rash. Neurological: Negative for dizziness and headaches.           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 Hyperlipidemia, unspecified hyperlipidemia type  -stable  -diet controlled  - LIPID PANEL;  Future      Follow-up PRN  Patient indicates understanding of the above

## 2019-02-12 ENCOUNTER — OFFICE VISIT (OUTPATIENT)
Dept: OTOLARYNGOLOGY | Facility: CLINIC | Age: 39
End: 2019-02-12
Payer: COMMERCIAL

## 2019-02-12 VITALS
BODY MASS INDEX: 29.82 KG/M2 | TEMPERATURE: 98 F | DIASTOLIC BLOOD PRESSURE: 74 MMHG | SYSTOLIC BLOOD PRESSURE: 109 MMHG | HEIGHT: 65 IN | WEIGHT: 179 LBS

## 2019-02-12 DIAGNOSIS — J33.9 NASAL POLYPS: Primary | ICD-10-CM

## 2019-02-12 PROCEDURE — 99212 OFFICE O/P EST SF 10 MIN: CPT | Performed by: OTOLARYNGOLOGY

## 2019-02-12 PROCEDURE — 99213 OFFICE O/P EST LOW 20 MIN: CPT | Performed by: OTOLARYNGOLOGY

## 2019-02-12 RX ORDER — METHYLPREDNISOLONE 4 MG/1
TABLET ORAL
Qty: 1 PACKAGE | Refills: 0 | Status: SHIPPED | OUTPATIENT
Start: 2019-02-12 | End: 2019-06-28 | Stop reason: ALTCHOICE

## 2019-02-12 RX ORDER — AMOXICILLIN AND CLAVULANATE POTASSIUM 875; 125 MG/1; MG/1
1 TABLET, FILM COATED ORAL EVERY 12 HOURS
Qty: 20 TABLET | Refills: 0 | Status: SHIPPED | OUTPATIENT
Start: 2019-02-12 | End: 2019-06-28 | Stop reason: ALTCHOICE

## 2019-02-13 NOTE — PROGRESS NOTES
Adi Menchaca is a 43year old female.  Patient presents with:  Nose Problem: c/o nasal drainage with greenish, yellowish mucus for 2 months    HPI:   She was doing somewhat better following her sinus surgery but her symptoms got worse once again about 2 m Normal, Tongue - Normal    Nasal Normal External nose - Normal. Nasal septum - Normal, Turbinates - Normal.  Nasal endoscopy demonstrates polypoid changes bilaterally.    Neurological Normal Memory - Normal. Cranial nerves - Cranial nerves II through XII gr

## 2019-06-26 ENCOUNTER — OFFICE VISIT (OUTPATIENT)
Dept: OBGYN CLINIC | Facility: CLINIC | Age: 39
End: 2019-06-26
Payer: COMMERCIAL

## 2019-06-26 VITALS
SYSTOLIC BLOOD PRESSURE: 96 MMHG | WEIGHT: 164 LBS | DIASTOLIC BLOOD PRESSURE: 65 MMHG | HEART RATE: 85 BPM | BODY MASS INDEX: 27 KG/M2

## 2019-06-26 DIAGNOSIS — N89.8 VAGINAL DISCHARGE: ICD-10-CM

## 2019-06-26 DIAGNOSIS — Z01.419 ENCOUNTER FOR GYNECOLOGICAL EXAMINATION WITHOUT ABNORMAL FINDING: Primary | ICD-10-CM

## 2019-06-26 DIAGNOSIS — R10.2 PELVIC PAIN: ICD-10-CM

## 2019-06-26 DIAGNOSIS — Z12.31 ENCOUNTER FOR SCREENING MAMMOGRAM FOR BREAST CANCER: ICD-10-CM

## 2019-06-26 DIAGNOSIS — Z12.4 CERVICAL CANCER SCREENING: ICD-10-CM

## 2019-06-26 PROCEDURE — 99395 PREV VISIT EST AGE 18-39: CPT | Performed by: OBSTETRICS & GYNECOLOGY

## 2019-06-26 NOTE — PROGRESS NOTES
Well Woman Exam    HPI:  The patient is a 43yo female who presents today for annual exam. She complains of pelvic pain (left side mostly). She continues to have cramping and \"swelling\" in her uterus. She complained of this last visit in 2017.  She was sup needs:         Medical: Not on file        Non-medical: Not on file    Tobacco Use      Smoking status: Never Smoker      Smokeless tobacco: Never Used    Substance and Sexual Activity      Alcohol use: Yes        Comment: social      Drug use: No      Sexu daily., Disp: 6 tablet, Rfl: 0  •  methylPREDNISolone (MEDROL) 4 MG Oral Tablet Therapy Pack, As directed., Disp: 1 kit, Rfl: 0    ALLERGIES:  No Known Allergies      Review of Systems:  Constitutional:  Denies fevers and chills   Cardiovascular:  denies c guidelines with recommendation to start mammograms at age 36; however, ACOG encourages shared decision making with the patient and together we reach appropriate screening intervals for the individual  2. Reviewed breast self awareness   3.  Mammogram ordere

## 2019-06-28 ENCOUNTER — OFFICE VISIT (OUTPATIENT)
Dept: INTERNAL MEDICINE CLINIC | Facility: CLINIC | Age: 39
End: 2019-06-28
Payer: COMMERCIAL

## 2019-06-28 VITALS
BODY MASS INDEX: 27.86 KG/M2 | WEIGHT: 167.19 LBS | DIASTOLIC BLOOD PRESSURE: 64 MMHG | SYSTOLIC BLOOD PRESSURE: 98 MMHG | HEIGHT: 65 IN | HEART RATE: 77 BPM

## 2019-06-28 DIAGNOSIS — E78.2 MIXED HYPERLIPIDEMIA: Primary | ICD-10-CM

## 2019-06-28 PROCEDURE — 99213 OFFICE O/P EST LOW 20 MIN: CPT | Performed by: INTERNAL MEDICINE

## 2019-06-28 RX ORDER — IBUPROFEN 800 MG/1
TABLET ORAL
Qty: 60 TABLET | Refills: 1 | Status: SHIPPED | OUTPATIENT
Start: 2019-06-28

## 2019-06-28 RX ORDER — FLUTICASONE PROPIONATE 50 MCG
2 SPRAY, SUSPENSION (ML) NASAL DAILY
Qty: 1 BOTTLE | Refills: 5 | Status: SHIPPED | OUTPATIENT
Start: 2019-06-28

## 2019-06-28 RX ORDER — MONTELUKAST SODIUM 10 MG/1
10 TABLET ORAL NIGHTLY
Qty: 30 TABLET | Refills: 5 | Status: SHIPPED | OUTPATIENT
Start: 2019-06-28 | End: 2020-02-10

## 2019-06-28 NOTE — PROGRESS NOTES
HPI:    Patient ID: Trice Del Toro is a 43year old female. Pt here for a 6 month fu on hyperlipidemia. Had high cholesterol of 229 on last blood workup- has been dieting. Fibromyalgia has been good since changing climate.     Review of Systems   Const

## 2019-06-28 NOTE — TELEPHONE ENCOUNTER
Requested Prescriptions     Pending Prescriptions Disp Refills   • IBUPROFEN 800 MG Oral Tab [Pharmacy Med Name: IBUPROFEN 800 MG TABS 800 TAB] 60 tablet 1     Sig: TAKE 1 TABLET BY MOUTH EVERY 6 HOURS AS NEEDED FOR PAIN     Last office visit: 6-28-19  Med

## 2019-06-29 ENCOUNTER — APPOINTMENT (OUTPATIENT)
Dept: LAB | Facility: REFERENCE LAB | Age: 39
End: 2019-06-29
Attending: INTERNAL MEDICINE
Payer: COMMERCIAL

## 2019-06-29 DIAGNOSIS — E78.2 MIXED HYPERLIPIDEMIA: ICD-10-CM

## 2019-06-29 LAB
ALBUMIN SERPL-MCNC: 3.9 G/DL (ref 3.4–5)
ALBUMIN/GLOB SERPL: 1.3 {RATIO} (ref 1–2)
ALP LIVER SERPL-CCNC: 51 U/L (ref 37–98)
ALT SERPL-CCNC: 16 U/L (ref 13–56)
ANION GAP SERPL CALC-SCNC: 7 MMOL/L (ref 0–18)
AST SERPL-CCNC: 11 U/L (ref 15–37)
BILIRUB SERPL-MCNC: 0.5 MG/DL (ref 0.1–2)
BUN BLD-MCNC: 9 MG/DL (ref 7–18)
BUN/CREAT SERPL: 12.2 (ref 10–20)
CALCIUM BLD-MCNC: 8.8 MG/DL (ref 8.5–10.1)
CHLORIDE SERPL-SCNC: 107 MMOL/L (ref 98–112)
CHOLEST SMN-MCNC: 187 MG/DL (ref ?–200)
CO2 SERPL-SCNC: 27 MMOL/L (ref 21–32)
CREAT BLD-MCNC: 0.74 MG/DL (ref 0.55–1.02)
GLOBULIN PLAS-MCNC: 3.1 G/DL (ref 2.8–4.4)
GLUCOSE BLD-MCNC: 86 MG/DL (ref 70–99)
HDLC SERPL-MCNC: 61 MG/DL (ref 40–59)
LDLC SERPL CALC-MCNC: 103 MG/DL (ref ?–100)
M PROTEIN MFR SERPL ELPH: 7 G/DL (ref 6.4–8.2)
NONHDLC SERPL-MCNC: 126 MG/DL (ref ?–130)
OSMOLALITY SERPL CALC.SUM OF ELEC: 290 MOSM/KG (ref 275–295)
PATIENT FASTING: YES
PATIENT FASTING: YES
POTASSIUM SERPL-SCNC: 4.4 MMOL/L (ref 3.5–5.1)
SODIUM SERPL-SCNC: 141 MMOL/L (ref 136–145)
TRIGL SERPL-MCNC: 116 MG/DL (ref 30–149)
VLDLC SERPL CALC-MCNC: 23 MG/DL (ref 0–30)

## 2019-06-29 PROCEDURE — 80061 LIPID PANEL: CPT

## 2019-06-29 PROCEDURE — 80053 COMPREHEN METABOLIC PANEL: CPT

## 2019-06-29 PROCEDURE — 36415 COLL VENOUS BLD VENIPUNCTURE: CPT

## 2019-07-10 ENCOUNTER — HOSPITAL ENCOUNTER (OUTPATIENT)
Dept: ULTRASOUND IMAGING | Age: 39
Discharge: HOME | End: 2019-07-10
Attending: OBSTETRICS & GYNECOLOGY
Payer: COMMERCIAL

## 2019-07-10 DIAGNOSIS — R10.2 PELVIC PAIN: ICD-10-CM

## 2019-07-10 PROCEDURE — 76856 US EXAM PELVIC COMPLETE: CPT | Performed by: OBSTETRICS & GYNECOLOGY

## 2019-07-10 PROCEDURE — 76830 TRANSVAGINAL US NON-OB: CPT | Performed by: OBSTETRICS & GYNECOLOGY

## 2020-02-06 ENCOUNTER — TELEPHONE (OUTPATIENT)
Dept: INTERNAL MEDICINE CLINIC | Facility: CLINIC | Age: 40
End: 2020-02-06

## 2020-02-06 NOTE — TELEPHONE ENCOUNTER
Was handed message from reception that patient called stating she broke a rib and cannot breath. Has appt 2/20/20.  ~~~~~  Called patient. She doesn't have any issues. She did not call the office.

## 2020-02-08 ENCOUNTER — HOSPITAL ENCOUNTER (OUTPATIENT)
Dept: MAMMOGRAPHY | Age: 40
Discharge: HOME | End: 2020-02-08
Attending: OBSTETRICS & GYNECOLOGY
Payer: COMMERCIAL

## 2020-02-08 DIAGNOSIS — Z12.31 ENCOUNTER FOR SCREENING MAMMOGRAM FOR BREAST CANCER: ICD-10-CM

## 2020-02-10 ENCOUNTER — OFFICE VISIT (OUTPATIENT)
Dept: INTERNAL MEDICINE CLINIC | Facility: CLINIC | Age: 40
End: 2020-02-10
Payer: COMMERCIAL

## 2020-02-10 ENCOUNTER — TELEPHONE (OUTPATIENT)
Dept: OBGYN CLINIC | Facility: CLINIC | Age: 40
End: 2020-02-10

## 2020-02-10 ENCOUNTER — HOSPITAL ENCOUNTER (OUTPATIENT)
Dept: GENERAL RADIOLOGY | Age: 40
Discharge: HOME | End: 2020-02-10
Attending: INTERNAL MEDICINE
Payer: COMMERCIAL

## 2020-02-10 VITALS
SYSTOLIC BLOOD PRESSURE: 121 MMHG | HEIGHT: 65 IN | BODY MASS INDEX: 27.82 KG/M2 | OXYGEN SATURATION: 99 % | WEIGHT: 167 LBS | HEART RATE: 67 BPM | DIASTOLIC BLOOD PRESSURE: 70 MMHG

## 2020-02-10 DIAGNOSIS — R09.1 PLEURISY: Primary | ICD-10-CM

## 2020-02-10 DIAGNOSIS — N60.01 BILATERAL BREAST CYSTS: Primary | ICD-10-CM

## 2020-02-10 DIAGNOSIS — R09.1 PLEURISY: ICD-10-CM

## 2020-02-10 DIAGNOSIS — N60.02 BILATERAL BREAST CYSTS: Primary | ICD-10-CM

## 2020-02-10 PROCEDURE — 71046 X-RAY EXAM CHEST 2 VIEWS: CPT | Performed by: INTERNAL MEDICINE

## 2020-02-10 PROCEDURE — 99213 OFFICE O/P EST LOW 20 MIN: CPT | Performed by: INTERNAL MEDICINE

## 2020-02-10 RX ORDER — NAPROXEN 500 MG/1
500 TABLET ORAL 2 TIMES DAILY WITH MEALS
Qty: 40 TABLET | Refills: 1 | Status: SHIPPED | OUTPATIENT
Start: 2020-02-10

## 2020-02-10 RX ORDER — MONTELUKAST SODIUM 10 MG/1
10 TABLET ORAL NIGHTLY
Qty: 30 TABLET | Refills: 5 | Status: SHIPPED | OUTPATIENT
Start: 2020-02-10 | End: 2021-02-05

## 2020-02-10 NOTE — TELEPHONE ENCOUNTER
Message to [facility]. Screening mammo was ordered. Ok to change to diagnostic?

## 2020-02-10 NOTE — TELEPHONE ENCOUNTER
Pt informed order can be changed but insurance may not cover. Pt asked for the CPT code and dx codes to check with insurance. Pt given codes. Pt states she knows her insurance will not cover a diagnostic US if the code is a screening code.   Pt wondering

## 2020-02-10 NOTE — TELEPHONE ENCOUNTER
Requested Prescriptions     Pending Prescriptions Disp Refills   • Montelukast Sodium 10 MG Oral Tab 30 tablet 5     Sig: Take 1 tablet (10 mg total) by mouth nightly.      Last office visit: 2-  Medication last refilled: 6-28-19

## 2020-02-10 NOTE — TELEPHONE ENCOUNTER
PT SAID MAMMOGRAPHY SAID SHE NEEDS A DIAGNOSTIC  TRAVON BILATERAL BECAUSE SHE HAS A CYST FOR YEARS ,

## 2020-02-10 NOTE — PROGRESS NOTES
HPI:    Patient ID: Helena Fine is a 35year old female. HPI Pt here for evaluation of sharp left sided chest pains associated with slight dyspnea. Hx of seasonal allergies. Ongoing for 2 months.   Review of Systems   Constitutional: Negative for fev

## 2020-03-17 ENCOUNTER — TELEPHONE (OUTPATIENT)
Dept: OTOLARYNGOLOGY | Facility: CLINIC | Age: 40
End: 2020-03-17

## 2020-03-17 NOTE — TELEPHONE ENCOUNTER
Pt scheduled mychart appt for sore throat, called pt for screening, pt did not answer. Please contact patient to determine if they should be treated over the phone by you or requires further testing.   Please refer to the \"Testing and Travel Recommendat

## 2020-03-18 ENCOUNTER — HOSPITAL ENCOUNTER (OUTPATIENT)
Dept: ULTRASOUND IMAGING | Facility: HOSPITAL | Age: 40
Discharge: HOME | End: 2020-03-18
Attending: OBSTETRICS & GYNECOLOGY
Payer: COMMERCIAL

## 2020-03-18 ENCOUNTER — HOSPITAL ENCOUNTER (OUTPATIENT)
Dept: MAMMOGRAPHY | Facility: HOSPITAL | Age: 40
Discharge: HOME | End: 2020-03-18
Attending: OBSTETRICS & GYNECOLOGY
Payer: COMMERCIAL

## 2020-03-18 ENCOUNTER — TELEPHONE (OUTPATIENT)
Dept: OBGYN CLINIC | Facility: CLINIC | Age: 40
End: 2020-03-18

## 2020-03-18 DIAGNOSIS — N60.02 BILATERAL BREAST CYSTS: ICD-10-CM

## 2020-03-18 DIAGNOSIS — N60.01 BILATERAL BREAST CYSTS: ICD-10-CM

## 2020-03-18 PROCEDURE — 77066 DX MAMMO INCL CAD BI: CPT | Performed by: OBSTETRICS & GYNECOLOGY

## 2020-03-18 PROCEDURE — 77062 BREAST TOMOSYNTHESIS BI: CPT | Performed by: OBSTETRICS & GYNECOLOGY

## 2020-03-18 PROCEDURE — 76642 ULTRASOUND BREAST LIMITED: CPT | Performed by: OBSTETRICS & GYNECOLOGY

## 2020-03-18 NOTE — TELEPHONE ENCOUNTER
Pt needs CPT code for a Bilateral diagnostic mammogram  Pt is down at central scheduling now  Call was dropped

## 2020-03-18 NOTE — TELEPHONE ENCOUNTER
Pt provided with CPT code that is on her bilateral diag kris mammo order. Pt verbalized understanding.

## 2020-03-18 NOTE — TELEPHONE ENCOUNTER
Pt has OV 3/19 at 1:30 with JDO/ Pt has sore throat/ Please advise if Pt should keep appt or have a telephone visit/    PC

## 2020-03-18 NOTE — TELEPHONE ENCOUNTER
Per patient since February has sore throat pain on the right side every time pt swallow,pt screened for COVID 19 ,per pt she spoke with Dr Justice Evans yesterday and ok to be seen tomorrow.

## 2020-03-19 ENCOUNTER — OFFICE VISIT (OUTPATIENT)
Dept: OTOLARYNGOLOGY | Facility: CLINIC | Age: 40
End: 2020-03-19
Payer: COMMERCIAL

## 2020-03-19 VITALS
WEIGHT: 167 LBS | TEMPERATURE: 98 F | BODY MASS INDEX: 27.49 KG/M2 | DIASTOLIC BLOOD PRESSURE: 81 MMHG | HEIGHT: 65.5 IN | SYSTOLIC BLOOD PRESSURE: 110 MMHG

## 2020-03-19 DIAGNOSIS — R07.0 THROAT PAIN: Primary | ICD-10-CM

## 2020-03-19 PROCEDURE — 99213 OFFICE O/P EST LOW 20 MIN: CPT | Performed by: OTOLARYNGOLOGY

## 2020-03-19 PROCEDURE — 31575 DIAGNOSTIC LARYNGOSCOPY: CPT | Performed by: OTOLARYNGOLOGY

## 2020-03-19 RX ORDER — SPIRONOLACTONE 50 MG/1
TABLET, FILM COATED ORAL
COMMUNITY
End: 2020-06-23 | Stop reason: ALTCHOICE

## 2020-03-19 NOTE — PROGRESS NOTES
Evan Rawls is a 35year old female. Patient presents with:  Throat Problem: pt feels a lump when she swallows and sore throat since Feb 2020    HPI:   She has had pain in her throat which is been present for the last 6 weeks.   Pain is on the right side Normal, Tonsils - Normal, Tongue - Normal   fiberoptic laryngoscopy shows no lesions in the hypopharynx or larynx   Nasal Normal External nose - Normal. Nasal septum - Normal, Turbinates - Normal   Neurological Normal Memory - Normal. Cranial nerves - Cran

## 2020-03-25 ENCOUNTER — TELEPHONE (OUTPATIENT)
Dept: OBGYN CLINIC | Facility: CLINIC | Age: 40
End: 2020-03-25

## 2020-03-25 DIAGNOSIS — N60.01 BREAST CYST, RIGHT: Primary | ICD-10-CM

## 2020-03-25 NOTE — TELEPHONE ENCOUNTER
Informed pt of KCB's recs below. Pt needs to return in six months for diag mammogram bilateral and r/u u/s of right breast in six months.

## 2020-03-25 NOTE — TELEPHONE ENCOUNTER
----- Message from Shirlie Baumgarten, MD sent at 3/22/2020  2:11 PM CDT -----  Please let patient know that her mammogram was overall normal. They would like to watch it a little closer. Recommendations and place orders.      SHORT TERM FOLLOW-UP DIAGNOSTIC M

## 2020-06-23 ENCOUNTER — OFFICE VISIT (OUTPATIENT)
Dept: INTERNAL MEDICINE CLINIC | Facility: CLINIC | Age: 40
End: 2020-06-23
Payer: COMMERCIAL

## 2020-06-23 VITALS
DIASTOLIC BLOOD PRESSURE: 66 MMHG | OXYGEN SATURATION: 99 % | SYSTOLIC BLOOD PRESSURE: 102 MMHG | WEIGHT: 166 LBS | HEART RATE: 86 BPM | RESPIRATION RATE: 17 BRPM | BODY MASS INDEX: 27.66 KG/M2 | HEIGHT: 65 IN

## 2020-06-23 DIAGNOSIS — E78.2 MIXED HYPERLIPIDEMIA: ICD-10-CM

## 2020-06-23 DIAGNOSIS — M54.40 ACUTE LEFT-SIDED LOW BACK PAIN WITH SCIATICA, SCIATICA LATERALITY UNSPECIFIED: Primary | ICD-10-CM

## 2020-06-23 DIAGNOSIS — E78.5 HYPERLIPIDEMIA, UNSPECIFIED HYPERLIPIDEMIA TYPE: ICD-10-CM

## 2020-06-23 PROCEDURE — 36415 COLL VENOUS BLD VENIPUNCTURE: CPT | Performed by: INTERNAL MEDICINE

## 2020-06-23 PROCEDURE — 99214 OFFICE O/P EST MOD 30 MIN: CPT | Performed by: INTERNAL MEDICINE

## 2020-06-23 PROCEDURE — 80061 LIPID PANEL: CPT | Performed by: INTERNAL MEDICINE

## 2020-06-23 RX ORDER — CYCLOBENZAPRINE HCL 10 MG
10 TABLET ORAL 3 TIMES DAILY
Qty: 30 TABLET | Refills: 1 | Status: SHIPPED | OUTPATIENT
Start: 2020-06-23 | End: 2020-07-13

## 2020-06-23 NOTE — PROGRESS NOTES
HPI:    Patient ID: Robinson Lugo is a 35year old female. Patient here for evaluation of left sciatic neve pain mostly quite localized occasionally radiating down the back of leg . Has started a walking program and this has caused a flare up of pain. prescriptions requested or ordered in this encounter       Imaging & Referrals:  PHYSICAL THERAPY - INTERNAL  XR LUMBAR SPINE (MIN 4 VIEWS) (CPT=72110)       DF#[id number]

## 2020-06-23 NOTE — PROGRESS NOTES
Pt presented to clinic today for blood draw. Per physician able to draw orders. Orders  documented within chart. Pt tolerated lab draw well.  verified.   Orders drawn include: lipid  Site of draw: rt sailaja Arevalo CMA

## 2020-06-24 ENCOUNTER — HOSPITAL ENCOUNTER (OUTPATIENT)
Dept: GENERAL RADIOLOGY | Age: 40
Discharge: HOME | End: 2020-06-24
Attending: INTERNAL MEDICINE
Payer: COMMERCIAL

## 2020-06-24 DIAGNOSIS — M54.40 ACUTE LEFT-SIDED LOW BACK PAIN WITH SCIATICA, SCIATICA LATERALITY UNSPECIFIED: ICD-10-CM

## 2020-06-24 PROCEDURE — 72110 X-RAY EXAM L-2 SPINE 4/>VWS: CPT | Performed by: INTERNAL MEDICINE

## 2020-08-10 ENCOUNTER — OFFICE VISIT (OUTPATIENT)
Dept: OBGYN CLINIC | Facility: CLINIC | Age: 40
End: 2020-08-10
Payer: COMMERCIAL

## 2020-08-10 VITALS
BODY MASS INDEX: 29 KG/M2 | HEART RATE: 83 BPM | SYSTOLIC BLOOD PRESSURE: 126 MMHG | WEIGHT: 175 LBS | DIASTOLIC BLOOD PRESSURE: 88 MMHG

## 2020-08-10 DIAGNOSIS — Z01.419 WELL WOMAN EXAM: Primary | ICD-10-CM

## 2020-08-10 DIAGNOSIS — N94.6 DYSMENORRHEA: ICD-10-CM

## 2020-08-10 PROCEDURE — 3079F DIAST BP 80-89 MM HG: CPT | Performed by: OBSTETRICS & GYNECOLOGY

## 2020-08-10 PROCEDURE — 99396 PREV VISIT EST AGE 40-64: CPT | Performed by: OBSTETRICS & GYNECOLOGY

## 2020-08-10 PROCEDURE — 99213 OFFICE O/P EST LOW 20 MIN: CPT | Performed by: OBSTETRICS & GYNECOLOGY

## 2020-08-10 PROCEDURE — 3074F SYST BP LT 130 MM HG: CPT | Performed by: OBSTETRICS & GYNECOLOGY

## 2020-08-10 NOTE — H&P
HPI:  The patient is a 36 yo F here for WWE. C/o dysmenorrhea. Chronic issue. Cycles q3.5 wks with 4 days flow. Changes pad q3h and sometimes passes small clots. Menses are painful in the day preceding and during. No pelvic pain outside of menses. Not on file    Tobacco Use      Smoking status: Never Smoker      Smokeless tobacco: Never Used    Substance and Sexual Activity      Alcohol use: Yes        Comment: social      Drug use: No      Sexual activity: Not on file        Comment: none    [facility] Cardiovascular:  denies chest pain or palpitations  Respiratory:  denies shortness of breath  Gastrointestinal:  denies heartburn, abdominal pain, diarrhea or constipation  Genitourinary:  denies dysuria, incontinence, abnormal vaginal discharge, vaginal pain pending US report  3. Breast Health:      1. F/u order in system (Due sept 2020)  2. Reviewed monthly self breast exams with the patient   4. Discussed diet, exercise, MVIs  5.  Follow up in 1 yr

## 2020-08-12 ENCOUNTER — HOSPITAL ENCOUNTER (OUTPATIENT)
Dept: ULTRASOUND IMAGING | Age: 40
Discharge: HOME | End: 2020-08-12
Attending: OBSTETRICS & GYNECOLOGY
Payer: COMMERCIAL

## 2020-08-12 DIAGNOSIS — N94.6 DYSMENORRHEA: ICD-10-CM

## 2020-08-12 PROCEDURE — 76830 TRANSVAGINAL US NON-OB: CPT | Performed by: OBSTETRICS & GYNECOLOGY

## 2020-08-12 PROCEDURE — 76856 US EXAM PELVIC COMPLETE: CPT | Performed by: OBSTETRICS & GYNECOLOGY

## 2020-08-17 ENCOUNTER — TELEPHONE (OUTPATIENT)
Dept: OBGYN CLINIC | Facility: CLINIC | Age: 40
End: 2020-08-17

## 2020-08-17 NOTE — TELEPHONE ENCOUNTER
Patient was scheduling 6 mnth Mammo. States she was reminded by the department to also get the [address]. Would like to verify if [facility] recommends mammo and US for the follow up.      Pls advise

## 2020-10-26 ENCOUNTER — HOSPITAL ENCOUNTER (OUTPATIENT)
Dept: MAMMOGRAPHY | Facility: HOSPITAL | Age: 40
Discharge: HOME | End: 2020-10-26
Attending: OBSTETRICS & GYNECOLOGY
Payer: COMMERCIAL

## 2020-10-26 ENCOUNTER — HOSPITAL ENCOUNTER (OUTPATIENT)
Dept: ULTRASOUND IMAGING | Facility: HOSPITAL | Age: 40
Discharge: HOME | End: 2020-10-26
Attending: OBSTETRICS & GYNECOLOGY
Payer: COMMERCIAL

## 2020-10-26 DIAGNOSIS — N60.01 BREAST CYST, RIGHT: ICD-10-CM

## 2020-10-26 PROCEDURE — 76642 ULTRASOUND BREAST LIMITED: CPT | Performed by: OBSTETRICS & GYNECOLOGY

## 2020-10-26 PROCEDURE — 77062 BREAST TOMOSYNTHESIS BI: CPT | Performed by: OBSTETRICS & GYNECOLOGY

## 2020-10-26 PROCEDURE — 77066 DX MAMMO INCL CAD BI: CPT | Performed by: OBSTETRICS & GYNECOLOGY

## 2021-01-29 RX ORDER — MONTELUKAST SODIUM 10 MG/1
10 TABLET ORAL NIGHTLY
Qty: 30 TABLET | Refills: 5 | OUTPATIENT
Start: 2021-01-29

## 2021-02-05 RX ORDER — MONTELUKAST SODIUM 10 MG/1
10 TABLET ORAL NIGHTLY
Qty: 30 TABLET | Refills: 5 | Status: SHIPPED | OUTPATIENT
Start: 2021-02-05

## 2021-08-25 RX ORDER — FLUTICASONE PROPIONATE 50 MCG
2 SPRAY, SUSPENSION (ML) NASAL DAILY
Qty: 16 G | Refills: 5 | OUTPATIENT
Start: 2021-08-25

## 2022-02-14 ENCOUNTER — HOSPITAL ENCOUNTER (OUTPATIENT)
Dept: MAMMOGRAPHY | Facility: HOSPITAL | Age: 42
Discharge: HOME | End: 2022-02-14
Payer: COMMERCIAL

## 2022-02-14 DIAGNOSIS — Z12.31 ENCOUNTER FOR SCREENING MAMMOGRAM FOR MALIGNANT NEOPLASM OF BREAST: ICD-10-CM

## 2022-02-14 PROCEDURE — 77063 BREAST TOMOSYNTHESIS BI: CPT | Performed by: OBSTETRICS & GYNECOLOGY

## 2022-02-14 PROCEDURE — 77067 SCR MAMMO BI INCL CAD: CPT | Performed by: OBSTETRICS & GYNECOLOGY

## 2022-02-14 PROCEDURE — 77067 SCR MAMMO BI INCL CAD: CPT

## 2022-02-14 PROCEDURE — 77063 BREAST TOMOSYNTHESIS BI: CPT

## 2022-09-01 ENCOUNTER — OFFICE VISIT (OUTPATIENT)
Dept: INTERNAL MEDICINE CLINIC | Facility: CLINIC | Age: 42
End: 2022-09-01
Payer: COMMERCIAL

## 2022-09-01 VITALS
HEIGHT: 65 IN | BODY MASS INDEX: 27.82 KG/M2 | SYSTOLIC BLOOD PRESSURE: 100 MMHG | WEIGHT: 167 LBS | DIASTOLIC BLOOD PRESSURE: 70 MMHG

## 2022-09-01 DIAGNOSIS — E78.49 OTHER HYPERLIPIDEMIA: ICD-10-CM

## 2022-09-01 DIAGNOSIS — I73.9 INTERMITTENT CLAUDICATION (HCC): ICD-10-CM

## 2022-09-01 DIAGNOSIS — M79.89 CALF SWELLING: Primary | ICD-10-CM

## 2022-09-01 LAB
ALBUMIN SERPL-MCNC: 3.8 G/DL (ref 3.4–5)
ALBUMIN/GLOB SERPL: 1.1 {RATIO} (ref 1–2)
ALP LIVER SERPL-CCNC: 42 U/L
ALT SERPL-CCNC: 17 U/L
ANION GAP SERPL CALC-SCNC: 7 MMOL/L (ref 0–18)
AST SERPL-CCNC: 11 U/L (ref 15–37)
BASOPHILS # BLD AUTO: 0.04 X10(3) UL (ref 0–0.2)
BASOPHILS NFR BLD AUTO: 0.8 %
BILIRUB SERPL-MCNC: 0.8 MG/DL (ref 0.1–2)
BUN BLD-MCNC: 7 MG/DL (ref 7–18)
BUN/CREAT SERPL: 9.5 (ref 10–20)
CALCIUM BLD-MCNC: 9.4 MG/DL (ref 8.5–10.1)
CHLORIDE SERPL-SCNC: 106 MMOL/L (ref 98–112)
CHOLEST SERPL-MCNC: 207 MG/DL (ref ?–200)
CO2 SERPL-SCNC: 26 MMOL/L (ref 21–32)
CREAT BLD-MCNC: 0.74 MG/DL
DEPRECATED RDW RBC AUTO: 45.1 FL (ref 35.1–46.3)
EOSINOPHIL # BLD AUTO: 0.04 X10(3) UL (ref 0–0.7)
EOSINOPHIL NFR BLD AUTO: 0.8 %
ERYTHROCYTE [DISTWIDTH] IN BLOOD BY AUTOMATED COUNT: 12.6 % (ref 11–15)
EST. AVERAGE GLUCOSE BLD GHB EST-MCNC: 103 MG/DL (ref 68–126)
FASTING PATIENT LIPID ANSWER: YES
FASTING STATUS PATIENT QL REPORTED: YES
GFR SERPLBLD BASED ON 1.73 SQ M-ARVRAT: 104 ML/MIN/1.73M2 (ref 60–?)
GLOBULIN PLAS-MCNC: 3.4 G/DL (ref 2.8–4.4)
GLUCOSE BLD-MCNC: 85 MG/DL (ref 70–99)
HBA1C MFR BLD: 5.2 % (ref ?–5.7)
HCT VFR BLD AUTO: 39.6 %
HDLC SERPL-MCNC: 76 MG/DL (ref 40–59)
HGB BLD-MCNC: 12.9 G/DL
IMM GRANULOCYTES # BLD AUTO: 0.01 X10(3) UL (ref 0–1)
IMM GRANULOCYTES NFR BLD: 0.2 %
LDLC SERPL CALC-MCNC: 110 MG/DL (ref ?–100)
LYMPHOCYTES # BLD AUTO: 2.06 X10(3) UL (ref 1–4)
LYMPHOCYTES NFR BLD AUTO: 41.4 %
MCH RBC QN AUTO: 31.5 PG (ref 26–34)
MCHC RBC AUTO-ENTMCNC: 32.6 G/DL (ref 31–37)
MCV RBC AUTO: 96.6 FL
MONOCYTES # BLD AUTO: 0.25 X10(3) UL (ref 0.1–1)
MONOCYTES NFR BLD AUTO: 5 %
NEUTROPHILS # BLD AUTO: 2.57 X10 (3) UL (ref 1.5–7.7)
NEUTROPHILS # BLD AUTO: 2.57 X10(3) UL (ref 1.5–7.7)
NEUTROPHILS NFR BLD AUTO: 51.8 %
NONHDLC SERPL-MCNC: 131 MG/DL (ref ?–130)
OSMOLALITY SERPL CALC.SUM OF ELEC: 285 MOSM/KG (ref 275–295)
PLATELET # BLD AUTO: 247 10(3)UL (ref 150–450)
POTASSIUM SERPL-SCNC: 4 MMOL/L (ref 3.5–5.1)
PROT SERPL-MCNC: 7.2 G/DL (ref 6.4–8.2)
RBC # BLD AUTO: 4.1 X10(6)UL
SODIUM SERPL-SCNC: 139 MMOL/L (ref 136–145)
TRIGL SERPL-MCNC: 120 MG/DL (ref 30–149)
VLDLC SERPL CALC-MCNC: 20 MG/DL (ref 0–30)
WBC # BLD AUTO: 5 X10(3) UL (ref 4–11)

## 2022-09-01 PROCEDURE — 3078F DIAST BP <80 MM HG: CPT | Performed by: INTERNAL MEDICINE

## 2022-09-01 PROCEDURE — 80053 COMPREHEN METABOLIC PANEL: CPT | Performed by: INTERNAL MEDICINE

## 2022-09-01 PROCEDURE — 3074F SYST BP LT 130 MM HG: CPT | Performed by: INTERNAL MEDICINE

## 2022-09-01 PROCEDURE — 83036 HEMOGLOBIN GLYCOSYLATED A1C: CPT | Performed by: INTERNAL MEDICINE

## 2022-09-01 PROCEDURE — 85025 COMPLETE CBC W/AUTO DIFF WBC: CPT | Performed by: INTERNAL MEDICINE

## 2022-09-01 PROCEDURE — 3008F BODY MASS INDEX DOCD: CPT | Performed by: INTERNAL MEDICINE

## 2022-09-01 PROCEDURE — 99214 OFFICE O/P EST MOD 30 MIN: CPT | Performed by: INTERNAL MEDICINE

## 2022-09-01 PROCEDURE — 80061 LIPID PANEL: CPT | Performed by: INTERNAL MEDICINE

## 2022-09-01 RX ORDER — ONDANSETRON 4 MG/1
TABLET, ORALLY DISINTEGRATING ORAL
COMMUNITY
End: 2022-09-01 | Stop reason: WASHOUT

## 2022-09-01 RX ORDER — CYCLOBENZAPRINE HCL 10 MG
TABLET ORAL
COMMUNITY
Start: 2022-04-07

## 2022-09-01 RX ORDER — ACETAMINOPHEN AND CODEINE PHOSPHATE 300; 30 MG/1; MG/1
TABLET ORAL
COMMUNITY

## 2022-09-01 RX ORDER — PHENTERMINE HYDROCHLORIDE 37.5 MG/1
TABLET ORAL
COMMUNITY
End: 2022-09-01 | Stop reason: WASHOUT

## 2022-09-01 RX ORDER — SPIRONOLACTONE 50 MG/1
TABLET, FILM COATED ORAL
COMMUNITY
End: 2022-09-01 | Stop reason: WASHOUT

## 2022-09-01 RX ORDER — PHENTERMINE HYDROCHLORIDE 37.5 MG/1
TABLET ORAL
COMMUNITY
Start: 2022-04-10 | End: 2022-09-01 | Stop reason: WASHOUT

## 2022-09-01 RX ORDER — PREDNISONE 10 MG/1
TABLET ORAL
COMMUNITY
End: 2022-09-01 | Stop reason: WASHOUT

## 2022-09-01 RX ORDER — PHENAZOPYRIDINE HYDROCHLORIDE 200 MG/1
TABLET, FILM COATED ORAL
COMMUNITY
End: 2022-09-01 | Stop reason: WASHOUT

## 2022-09-01 RX ORDER — AZITHROMYCIN 250 MG/1
TABLET, FILM COATED ORAL
COMMUNITY

## 2022-09-01 RX ORDER — NITROFURANTOIN 25; 75 MG/1; MG/1
CAPSULE ORAL
COMMUNITY
Start: 2022-04-14

## 2022-09-01 RX ORDER — OMEPRAZOLE 40 MG/1
CAPSULE, DELAYED RELEASE ORAL
COMMUNITY
End: 2022-09-01 | Stop reason: WASHOUT

## 2022-09-02 ENCOUNTER — HOSPITAL ENCOUNTER (OUTPATIENT)
Dept: ULTRASOUND IMAGING | Facility: HOSPITAL | Age: 42
Discharge: HOME | End: 2022-09-02
Attending: INTERNAL MEDICINE
Payer: COMMERCIAL

## 2022-09-02 DIAGNOSIS — I73.9 INTERMITTENT CLAUDICATION (HCC): ICD-10-CM

## 2022-09-02 PROCEDURE — 93923 UPR/LXTR ART STDY 3+ LVLS: CPT | Performed by: INTERNAL MEDICINE

## 2022-12-05 ENCOUNTER — OFFICE VISIT (OUTPATIENT)
Dept: INTERNAL MEDICINE CLINIC | Facility: CLINIC | Age: 42
End: 2022-12-05
Payer: COMMERCIAL

## 2022-12-05 VITALS
DIASTOLIC BLOOD PRESSURE: 80 MMHG | HEART RATE: 53 BPM | HEIGHT: 65 IN | SYSTOLIC BLOOD PRESSURE: 120 MMHG | BODY MASS INDEX: 28.82 KG/M2 | WEIGHT: 173 LBS | OXYGEN SATURATION: 98 %

## 2022-12-05 DIAGNOSIS — M79.7 FIBROMYALGIA: Primary | ICD-10-CM

## 2022-12-05 PROCEDURE — 99214 OFFICE O/P EST MOD 30 MIN: CPT | Performed by: INTERNAL MEDICINE

## 2022-12-05 PROCEDURE — 3008F BODY MASS INDEX DOCD: CPT | Performed by: INTERNAL MEDICINE

## 2022-12-05 PROCEDURE — 3074F SYST BP LT 130 MM HG: CPT | Performed by: INTERNAL MEDICINE

## 2022-12-05 PROCEDURE — 3079F DIAST BP 80-89 MM HG: CPT | Performed by: INTERNAL MEDICINE

## 2022-12-05 RX ORDER — PREGABALIN 25 MG/1
CAPSULE ORAL
Qty: 90 CAPSULE | Refills: 2 | Status: SHIPPED | OUTPATIENT
Start: 2022-12-05

## 2022-12-05 RX ORDER — FLUTICASONE PROPIONATE 50 MCG
2 SPRAY, SUSPENSION (ML) NASAL DAILY
Qty: 1 EACH | Refills: 11 | Status: SHIPPED | OUTPATIENT
Start: 2022-12-05

## 2022-12-05 RX ORDER — MONTELUKAST SODIUM 10 MG/1
10 TABLET ORAL NIGHTLY
Qty: 30 TABLET | Refills: 11 | Status: SHIPPED | OUTPATIENT
Start: 2022-12-05

## 2023-01-30 ENCOUNTER — TELEPHONE (OUTPATIENT)
Dept: OBGYN CLINIC | Facility: CLINIC | Age: 43
End: 2023-01-30

## 2023-01-30 ENCOUNTER — LAB ENCOUNTER (OUTPATIENT)
Dept: LAB | Facility: HOSPITAL | Age: 43
End: 2023-01-30
Attending: INTERNAL MEDICINE
Payer: COMMERCIAL

## 2023-01-30 DIAGNOSIS — M79.7 FIBROMYALGIA: ICD-10-CM

## 2023-01-30 LAB
ALBUMIN SERPL-MCNC: 4.3 G/DL (ref 3.4–5)
ALBUMIN/GLOB SERPL: 1.5 {RATIO} (ref 1–2)
ALP LIVER SERPL-CCNC: 49 U/L
ALT SERPL-CCNC: 19 U/L
ANION GAP SERPL CALC-SCNC: 4 MMOL/L (ref 0–18)
AST SERPL-CCNC: 15 U/L (ref 15–37)
BILIRUB SERPL-MCNC: 0.5 MG/DL (ref 0.1–2)
BUN BLD-MCNC: 10 MG/DL (ref 7–18)
BUN/CREAT SERPL: 11.8 (ref 10–20)
CALCIUM BLD-MCNC: 9.9 MG/DL (ref 8.5–10.1)
CHLORIDE SERPL-SCNC: 106 MMOL/L (ref 98–112)
CO2 SERPL-SCNC: 28 MMOL/L (ref 21–32)
CREAT BLD-MCNC: 0.85 MG/DL
CRP SERPL-MCNC: <0.29 MG/DL (ref ?–0.3)
ERYTHROCYTE [SEDIMENTATION RATE] IN BLOOD: 5 MM/HR
FASTING STATUS PATIENT QL REPORTED: NO
GFR SERPLBLD BASED ON 1.73 SQ M-ARVRAT: 87 ML/MIN/1.73M2 (ref 60–?)
GLOBULIN PLAS-MCNC: 2.9 G/DL (ref 2.8–4.4)
GLUCOSE BLD-MCNC: 97 MG/DL (ref 70–99)
OSMOLALITY SERPL CALC.SUM OF ELEC: 285 MOSM/KG (ref 275–295)
POTASSIUM SERPL-SCNC: 4.1 MMOL/L (ref 3.5–5.1)
PROT SERPL-MCNC: 7.2 G/DL (ref 6.4–8.2)
SODIUM SERPL-SCNC: 138 MMOL/L (ref 136–145)
T4 FREE SERPL-MCNC: 0.9 NG/DL (ref 0.8–1.7)
TSI SER-ACNC: 0.89 MIU/ML (ref 0.36–3.74)

## 2023-01-30 PROCEDURE — 36415 COLL VENOUS BLD VENIPUNCTURE: CPT

## 2023-01-30 PROCEDURE — 84439 ASSAY OF FREE THYROXINE: CPT

## 2023-01-30 PROCEDURE — 86140 C-REACTIVE PROTEIN: CPT

## 2023-01-30 PROCEDURE — 80053 COMPREHEN METABOLIC PANEL: CPT

## 2023-01-30 PROCEDURE — 84443 ASSAY THYROID STIM HORMONE: CPT

## 2023-01-30 PROCEDURE — 85652 RBC SED RATE AUTOMATED: CPT

## 2023-02-03 ENCOUNTER — TELEPHONE (OUTPATIENT)
Dept: OBGYN CLINIC | Facility: CLINIC | Age: 43
End: 2023-02-03

## 2023-02-03 ENCOUNTER — OFFICE VISIT (OUTPATIENT)
Dept: OBGYN CLINIC | Facility: CLINIC | Age: 43
End: 2023-02-03

## 2023-02-03 VITALS
SYSTOLIC BLOOD PRESSURE: 118 MMHG | BODY MASS INDEX: 28 KG/M2 | WEIGHT: 171 LBS | HEART RATE: 80 BPM | DIASTOLIC BLOOD PRESSURE: 80 MMHG

## 2023-02-03 DIAGNOSIS — N80.9 ENDOMETRIOSIS: ICD-10-CM

## 2023-02-03 DIAGNOSIS — N92.6 IRREGULAR MENSES: ICD-10-CM

## 2023-02-03 DIAGNOSIS — Z01.419 ENCOUNTER FOR GYNECOLOGICAL EXAMINATION WITHOUT ABNORMAL FINDING: ICD-10-CM

## 2023-02-03 DIAGNOSIS — Z32.00 PREGNANCY EXAMINATION OR TEST, PREGNANCY UNCONFIRMED: Primary | ICD-10-CM

## 2023-02-03 DIAGNOSIS — R10.2 PELVIC PAIN IN FEMALE: ICD-10-CM

## 2023-02-03 DIAGNOSIS — R10.2 SUPRAPUBIC PRESSURE: ICD-10-CM

## 2023-02-03 DIAGNOSIS — Z12.4 SCREENING FOR MALIGNANT NEOPLASM OF CERVIX: Primary | ICD-10-CM

## 2023-02-03 DIAGNOSIS — Z12.31 ENCOUNTER FOR SCREENING MAMMOGRAM FOR MALIGNANT NEOPLASM OF BREAST: Primary | ICD-10-CM

## 2023-02-03 DIAGNOSIS — Z12.31 VISIT FOR SCREENING MAMMOGRAM: ICD-10-CM

## 2023-02-03 PROCEDURE — 99213 OFFICE O/P EST LOW 20 MIN: CPT | Performed by: OBSTETRICS & GYNECOLOGY

## 2023-02-03 PROCEDURE — 3079F DIAST BP 80-89 MM HG: CPT | Performed by: OBSTETRICS & GYNECOLOGY

## 2023-02-03 PROCEDURE — 3074F SYST BP LT 130 MM HG: CPT | Performed by: OBSTETRICS & GYNECOLOGY

## 2023-02-03 PROCEDURE — 99396 PREV VISIT EST AGE 40-64: CPT | Performed by: OBSTETRICS & GYNECOLOGY

## 2023-02-03 NOTE — TELEPHONE ENCOUNTER
Pt called and informed that Mammogram order was placed and would need to reach out to CAP regarding pelvic ultrasound order. Pt also states besides qual HCG and thyroid CAP was going to place hormone level checks. Informed pt will include in message to CAP and office will reach out once she responses. Pt states understanding. To CAP to please review and advise. Office not no completed at time of call. Thank you.

## 2023-02-06 LAB — HPV I/H RISK 1 DNA SPEC QL NAA+PROBE: NEGATIVE

## 2023-02-09 NOTE — TELEPHONE ENCOUNTER
MD Hetal Tony, RN; [facility] Clinical Staff 12 hours ago (7:51 PM)     Please order LH,FSH,estradiol and also pelvic US for pelvic pain

## 2023-02-10 ENCOUNTER — LAB ENCOUNTER (OUTPATIENT)
Dept: LAB | Facility: HOSPITAL | Age: 43
End: 2023-02-10
Attending: OBSTETRICS & GYNECOLOGY
Payer: COMMERCIAL

## 2023-02-10 DIAGNOSIS — R10.2 PELVIC PAIN IN FEMALE: ICD-10-CM

## 2023-02-10 DIAGNOSIS — N92.6 IRREGULAR MENSES: ICD-10-CM

## 2023-02-10 LAB
ESTRADIOL SERPL-MCNC: 254.3 PG/ML
FSH SERPL-ACNC: 27.2 MIU/ML
HCG SERPL QL: NEGATIVE
LH SERPL-ACNC: 29.8 MIU/ML
TSI SER-ACNC: 1.01 MIU/ML (ref 0.36–3.74)

## 2023-02-10 PROCEDURE — 84703 CHORIONIC GONADOTROPIN ASSAY: CPT

## 2023-02-10 PROCEDURE — 83002 ASSAY OF GONADOTROPIN (LH): CPT

## 2023-02-10 PROCEDURE — 84443 ASSAY THYROID STIM HORMONE: CPT

## 2023-02-10 PROCEDURE — 83001 ASSAY OF GONADOTROPIN (FSH): CPT

## 2023-02-10 PROCEDURE — 36415 COLL VENOUS BLD VENIPUNCTURE: CPT

## 2023-02-10 PROCEDURE — 82670 ASSAY OF TOTAL ESTRADIOL: CPT

## 2023-02-15 ENCOUNTER — PATIENT MESSAGE (OUTPATIENT)
Dept: OBGYN CLINIC | Facility: CLINIC | Age: 43
End: 2023-02-15

## 2023-02-15 RX ORDER — LEVONORGESTREL / ETHINYL ESTRADIOL AND ETHINYL ESTRADIOL 150-30(84)
1 KIT ORAL DAILY
Qty: 91 TABLET | Refills: 1 | Status: SHIPPED | OUTPATIENT
Start: 2023-02-15 | End: 2024-02-15

## 2023-02-15 RX ORDER — MEDROXYPROGESTERONE ACETATE 10 MG/1
10 TABLET ORAL DAILY
Qty: 5 TABLET | Refills: 0 | Status: SHIPPED | OUTPATIENT
Start: 2023-02-15 | End: 2023-02-20

## 2023-02-15 NOTE — TELEPHONE ENCOUNTER
notes from office visit on 2/3 with CAP state: We discussed lupron, orlissa and continuous ocps for tx of the pelvic pain and the risks and benefits of each option. Pt opted for the ocps and we discussed provera withdrawal after neg preg test and then will start the ocps. She will rtc for 3 months follow up. Pt had hcg qual on 2/10 and it was negative. message to CAP, how many days of provera does pt need? And which OCP is she starting?

## 2023-02-15 NOTE — TELEPHONE ENCOUNTER
Provera 10 mg 1 t po every day x 5 days then start seasonique when menses starts. 3 month supply of seasonique with 1 refill.

## 2023-02-17 ENCOUNTER — HOSPITAL ENCOUNTER (OUTPATIENT)
Dept: ULTRASOUND IMAGING | Age: 43
Discharge: HOME | End: 2023-02-17
Attending: OBSTETRICS & GYNECOLOGY
Payer: COMMERCIAL

## 2023-02-17 DIAGNOSIS — N92.6 IRREGULAR MENSES: ICD-10-CM

## 2023-02-17 DIAGNOSIS — R10.2 SUPRAPUBIC PRESSURE: ICD-10-CM

## 2023-02-17 DIAGNOSIS — N80.9 ENDOMETRIOSIS: ICD-10-CM

## 2023-02-17 PROCEDURE — 76830 TRANSVAGINAL US NON-OB: CPT | Performed by: OBSTETRICS & GYNECOLOGY

## 2023-02-17 PROCEDURE — 76856 US EXAM PELVIC COMPLETE: CPT | Performed by: OBSTETRICS & GYNECOLOGY

## 2023-02-18 ENCOUNTER — HOSPITAL ENCOUNTER (OUTPATIENT)
Dept: MAMMOGRAPHY | Facility: HOSPITAL | Age: 43
Discharge: HOME | End: 2023-02-18
Attending: OBSTETRICS & GYNECOLOGY
Payer: COMMERCIAL

## 2023-02-18 ENCOUNTER — TELEPHONE (OUTPATIENT)
Dept: OBGYN CLINIC | Facility: CLINIC | Age: 43
End: 2023-02-18

## 2023-02-18 DIAGNOSIS — Z12.31 VISIT FOR SCREENING MAMMOGRAM: ICD-10-CM

## 2023-02-18 DIAGNOSIS — N83.201 RIGHT OVARIAN CYST: Primary | ICD-10-CM

## 2023-02-18 PROCEDURE — 77067 SCR MAMMO BI INCL CAD: CPT | Performed by: OBSTETRICS & GYNECOLOGY

## 2023-02-18 PROCEDURE — 77063 BREAST TOMOSYNTHESIS BI: CPT | Performed by: OBSTETRICS & GYNECOLOGY

## 2023-02-18 NOTE — TELEPHONE ENCOUNTER
----- Message from Jazmine Soria MD sent at 2/17/2023  4:16 PM CST -----  There is a possible right ovarian endometrioma vs a hemorrhagic ovulation cyst that will resolve on it's own spontaneously. Rec repeat her US in 8-12 weeks to check for resolution.   Please order and call pt

## 2023-02-20 NOTE — TELEPHONE ENCOUNTER
Pt called and informed of ultrasound results and CAP recs, states understanding. Order for f/u pelvic ultrasound placed.

## 2023-03-29 ENCOUNTER — NURSE ONLY (OUTPATIENT)
Dept: INTERNAL MEDICINE CLINIC | Facility: HOSPITAL | Age: 43
End: 2023-03-29
Attending: EMERGENCY MEDICINE

## 2023-03-29 DIAGNOSIS — Z00.00 WELLNESS EXAMINATION: Primary | ICD-10-CM

## 2023-03-29 PROCEDURE — 86480 TB TEST CELL IMMUN MEASURE: CPT

## 2023-03-30 LAB
M TB IFN-G CD4+ T-CELLS BLD-ACNC: 0.05 IU/ML
M TB TUBERC IFN-G BLD QL: NEGATIVE
M TB TUBERC IGNF/MITOGEN IGNF CONTROL: >10 IU/ML
QFT TB1 AG MINUS NIL: 0.05 IU/ML
QFT TB2 AG MINUS NIL: 0.04 IU/ML

## 2023-04-28 ENCOUNTER — HOSPITAL ENCOUNTER (OUTPATIENT)
Dept: ULTRASOUND IMAGING | Facility: HOSPITAL | Age: 43
Discharge: HOME | End: 2023-04-28
Attending: OBSTETRICS & GYNECOLOGY
Payer: COMMERCIAL

## 2023-04-28 DIAGNOSIS — N83.201 RIGHT OVARIAN CYST: ICD-10-CM

## 2023-04-28 PROCEDURE — 76856 US EXAM PELVIC COMPLETE: CPT | Performed by: OBSTETRICS & GYNECOLOGY

## 2023-04-28 PROCEDURE — 76830 TRANSVAGINAL US NON-OB: CPT | Performed by: OBSTETRICS & GYNECOLOGY

## 2023-05-10 ENCOUNTER — TELEPHONE (OUTPATIENT)
Dept: OBGYN CLINIC | Facility: CLINIC | Age: 43
End: 2023-05-10

## 2023-05-10 NOTE — TELEPHONE ENCOUNTER
----- Message from Bri Green MD sent at 4/30/2023  2:41 PM CDT -----  Right ovarian cyst has resolved. She has 2 very tiny fibroids on the surface of her uterus- pea-sized- too small to be clinically significant at this time and we will monitor them at the time of her annual pelvic exams. Please inform her of how common fibroids are in up to 75% of women.

## 2023-05-12 ENCOUNTER — OFFICE VISIT (OUTPATIENT)
Dept: OBGYN CLINIC | Facility: CLINIC | Age: 43
End: 2023-05-12

## 2023-05-12 VITALS
HEART RATE: 70 BPM | SYSTOLIC BLOOD PRESSURE: 115 MMHG | DIASTOLIC BLOOD PRESSURE: 76 MMHG | BODY MASS INDEX: 29 KG/M2 | WEIGHT: 177.19 LBS

## 2023-05-12 DIAGNOSIS — Z01.419 ENCOUNTER FOR GYNECOLOGICAL EXAMINATION WITHOUT ABNORMAL FINDING: Primary | ICD-10-CM

## 2023-05-12 PROCEDURE — 99213 OFFICE O/P EST LOW 20 MIN: CPT | Performed by: OBSTETRICS & GYNECOLOGY

## 2023-05-12 PROCEDURE — 3078F DIAST BP <80 MM HG: CPT | Performed by: OBSTETRICS & GYNECOLOGY

## 2023-05-12 PROCEDURE — 3074F SYST BP LT 130 MM HG: CPT | Performed by: OBSTETRICS & GYNECOLOGY

## 2023-05-16 RX ORDER — LEVONORGESTREL / ETHINYL ESTRADIOL AND ETHINYL ESTRADIOL 150-30(84)
1 KIT ORAL DAILY
Qty: 91 TABLET | Refills: 3 | Status: SHIPPED | OUTPATIENT
Start: 2023-05-16 | End: 2024-05-15

## 2023-06-23 ENCOUNTER — OFFICE VISIT (OUTPATIENT)
Dept: UROLOGY | Facility: HOSPITAL | Age: 43
End: 2023-06-23
Attending: OBSTETRICS & GYNECOLOGY
Payer: COMMERCIAL

## 2023-06-23 VITALS — HEIGHT: 65 IN | WEIGHT: 177 LBS | RESPIRATION RATE: 16 BRPM | BODY MASS INDEX: 29.49 KG/M2

## 2023-06-23 DIAGNOSIS — N39.3 FEMALE STRESS INCONTINENCE: Primary | ICD-10-CM

## 2023-06-23 DIAGNOSIS — R10.2 PELVIC PAIN: ICD-10-CM

## 2023-06-23 DIAGNOSIS — N81.84 PELVIC MUSCLE WASTING: ICD-10-CM

## 2023-06-23 LAB
BLOOD URINE: NEGATIVE
CONTROL RUN WITHIN 24 HOURS?: YES
LEUKOCYTE ESTERASE URINE: NEGATIVE
NITRITE URINE: NEGATIVE

## 2023-06-23 PROCEDURE — 87086 URINE CULTURE/COLONY COUNT: CPT | Performed by: OBSTETRICS & GYNECOLOGY

## 2023-06-23 PROCEDURE — 99212 OFFICE O/P EST SF 10 MIN: CPT

## 2023-06-23 PROCEDURE — 81002 URINALYSIS NONAUTO W/O SCOPE: CPT | Performed by: OBSTETRICS & GYNECOLOGY

## 2023-07-17 ENCOUNTER — OFFICE VISIT (OUTPATIENT)
Dept: UROLOGY | Facility: HOSPITAL | Age: 43
End: 2023-07-17
Payer: COMMERCIAL

## 2023-07-17 VITALS — RESPIRATION RATE: 18 BRPM | BODY MASS INDEX: 29.49 KG/M2 | HEIGHT: 65 IN | WEIGHT: 177 LBS

## 2023-07-17 DIAGNOSIS — N39.3 FEMALE STRESS INCONTINENCE: Primary | ICD-10-CM

## 2023-07-17 PROCEDURE — 51741 ELECTRO-UROFLOWMETRY FIRST: CPT

## 2023-07-17 PROCEDURE — 51784 ANAL/URINARY MUSCLE STUDY: CPT

## 2023-07-17 PROCEDURE — 51797 INTRAABDOMINAL PRESSURE TEST: CPT

## 2023-07-17 PROCEDURE — 51729 CYSTOMETROGRAM W/VP&UP: CPT

## 2023-07-17 PROCEDURE — 81002 URINALYSIS NONAUTO W/O SCOPE: CPT

## 2023-07-17 NOTE — PROCEDURES
Patient here for urodynamic testing. Procedure explained and confirmed by patient. See evaluation form for results. Both verbal and written discharge instructions were given. Patient tolerated procedure well and will follow up with Dr. Lalo Giordano on 2023 via telephone for UDS follow up. URODYNAMIC EVALUATION    PATIENT HISTORY:    Prolapse:  No  EDWARD:  Yes  UUI:  No  Nocturia:  0  Frequency:  every 1-2 hours  Incomplete Emptying:  No  Constipation:  Yes (Bowel regimen reviewed. Bowel handouts provided)  Last void prior to UDS testin hours  Current urge to void? Moderate  OAB meds stopped prior to test?  NA  Other symptoms? Surgery? []  No  [x]  Yes, specify date: Interested in surgical options. Surgery not scheduled at the time of UDS testing. Surgical packet provided. Informed consent signed and scanned into the patients chart. PATIENT DIAGNOSIS:  Stress Incontinence N39.3    MEDICATION: No outpatient medications have been marked as taking for the 23 encounter (Office Visit) with Amritflorien 150 PROCEDURE. ALLERGIES:  Patient has no known allergies. EXAM:  Urinalysis Dip:  Today's Results   Component Date Value    control run 2023 Yes     Blood Urine 2023 Negative     Nitrite Urine 2023 Negative     Leukocyte esterase urine 2023 Negative       [place]ro[place] ( >30  degrees ):  [x]  Mobile  []  Fixed    Perineal Sensation:  [x]  Normal  []  Abnormal    Additional Notes:    PROLAPSE (past introitus):  []  Yes  [x]  No  Prolapse reduced for testing?   []  Yes  [x]  No  []  Pessary  []  Speculum  []  Proctoswab  []  Vag Packing    Additional Notes:    UROFLOWMETRY:  Voided Volume:                 430            mL  Maximum Flow Rate:                     25           mL/sec  Average flow rate:                 14          mL/sec  Post-void Residual:                40              mL  Pattern:  []  Normal  []  Poor flow     [x]  Intermittent [] Other  Void:   []  Typical  []  Atypical    Additional Notes: Uroflow obtained unreduced. CYSTOMETRY:  Urethral Catheter:  Fr 7 / tdoc  Abd Catheter:     Fr 7 / tdoc   Infusion:  Water Rate 50 mL/min  Temp:  Room  Position:  [x]  Sit  []  Stand  []  Supine  First sensation:   18 mL  First desire to void:   166 mL  Strong desire to void:  264 mL  Maximum cystometric capacity:   350 mL  Detrusor Activity:  []  Unstable   [x]  Stable  Urge leakage? []  Yes [x]  No  Volume at 1st unhibited detrusor cont:   NA mL  Detrusor instability provoked by:    []  Spontaneous []  Coughing  []  Filling  []  Valsalva  []  Other    Additional Notes:      URETHRAL FUNCTION:  Valsava (vesical) Leak Point Pressures:    Volume Leak Point Pressure Leak? Cough Valsalva      100 mL 157 158    cm H2O []  Yes [x]  No   200 mL 137 154    cm H2O [x]  Yes with cough []  No   300 mL 164 131    cm H2O [x]  Yes with valsalva []  No   350 mL 127 138 cm H2O [x]  Yes with cough and valsalva []  No         Genuine Stress Incontinence demonstrated? [x]  Yes @ 200 unreduced  []  No    Resting Urethral Pressure Profile:     Functional Urethral Length:         0.9 cm         1.0        cm     Maximum UCP:         58  cm           57  cm       PRESSURE/FLOW STUDY:    Voided volume:    577    mL  Maximum flow rate:     9   mL/sec  Pressure Detrusor (at maximum flow):        38    cm H2O  Post void residual:        1       mL  Voiding mechanism:  []  Abnormal  [x]  Normal  []  Strain to void   []  Weak detrusor  Void:   [x]  Typical   []  Atypical    Additional Notes: Flow study obtained unreduced.     EMG:  [x]  Reactive []  Non-Reactive    7/17/2023 12:16 PM     PERFORMED BY:  Janneth Morrell RN        URODYNAMIC PHYSICIAN INTERPRETATION    IMPRESSION:   430/40cc & 577/1cc  group home 350cc  No DO  EDWARD @ 200 unreduced     Post-Procedure Diagnoses: Stress urinary incontinence [N39.3]    Comments:      Tavo Chaves DO   7/17/2023   12:42 PM

## 2023-08-07 ENCOUNTER — OFFICE VISIT (OUTPATIENT)
Dept: INTERNAL MEDICINE CLINIC | Facility: CLINIC | Age: 43
End: 2023-08-07
Payer: COMMERCIAL

## 2023-08-07 VITALS
SYSTOLIC BLOOD PRESSURE: 110 MMHG | OXYGEN SATURATION: 98 % | DIASTOLIC BLOOD PRESSURE: 70 MMHG | TEMPERATURE: 100 F | HEART RATE: 79 BPM | HEIGHT: 65 IN | WEIGHT: 174 LBS | BODY MASS INDEX: 28.99 KG/M2

## 2023-08-07 DIAGNOSIS — I87.2 VENOUS INSUFFICIENCY OF BOTH LOWER EXTREMITIES: Primary | ICD-10-CM

## 2023-08-07 DIAGNOSIS — M79.7 FIBROMYALGIA: ICD-10-CM

## 2023-08-07 PROCEDURE — 99213 OFFICE O/P EST LOW 20 MIN: CPT | Performed by: INTERNAL MEDICINE

## 2023-08-07 PROCEDURE — 3074F SYST BP LT 130 MM HG: CPT | Performed by: INTERNAL MEDICINE

## 2023-08-07 PROCEDURE — 3078F DIAST BP <80 MM HG: CPT | Performed by: INTERNAL MEDICINE

## 2023-08-07 PROCEDURE — 3008F BODY MASS INDEX DOCD: CPT | Performed by: INTERNAL MEDICINE

## 2023-08-07 RX ORDER — LEVONORGESTREL / ETHINYL ESTRADIOL AND ETHINYL ESTRADIOL 150-30(84)
1 KIT ORAL DAILY
Qty: 90 TABLET | Refills: 3 | Status: SHIPPED | OUTPATIENT
Start: 2023-08-07 | End: 2024-08-06

## 2023-08-07 RX ORDER — PREGABALIN 25 MG/1
CAPSULE ORAL
Qty: 90 CAPSULE | Refills: 3 | Status: SHIPPED | OUTPATIENT
Start: 2023-08-07

## 2023-08-11 ENCOUNTER — VIRTUAL PHONE E/M (OUTPATIENT)
Dept: UROLOGY | Facility: HOSPITAL | Age: 43
End: 2023-08-11
Attending: OBSTETRICS & GYNECOLOGY
Payer: COMMERCIAL

## 2023-08-11 VITALS — BODY MASS INDEX: 28.99 KG/M2 | HEIGHT: 65 IN | WEIGHT: 174 LBS

## 2023-08-11 DIAGNOSIS — N39.3 FEMALE STRESS INCONTINENCE: ICD-10-CM

## 2023-08-11 DIAGNOSIS — N81.84 PELVIC MUSCLE WASTING: ICD-10-CM

## 2023-08-11 DIAGNOSIS — R35.0 URINARY FREQUENCY: Primary | ICD-10-CM

## 2023-08-11 NOTE — PROGRESS NOTES
Pt presents w/ initial c/o UI  Urodynamic testing undergone without complication. Results reviewed with patient via telephone    Given circumstances surrounding COVID-19 this visit is being conducted as a televisit with pt's consent. Pt in safe, private environment for televisit, provider located in the office setting    430/40cc & 577/1cc  [place] 350cc  No DO  EDWARD @ 200 unreduced      Discussed with patient mgmt options for EDWARD  Biggest bother is frequency  Considering hyst given above, considering EDWARD mgmt (early 2024)    Discussed dietary and behavioral modifications for mgmt of urinary symptoms  Discussed weight management and benefits of weight loss on urinary symptoms  Reviewed AUA/SUFU guidelines on mgmt of non-neurogenic OAB  Discussed pharmacologic and nonpharmacologic mgmt options of urinary symptoms - reviewed risks, benefits, alternatives, and goals of treatment  Discussed specific risks related to OAB meds including, but not limited to dry mouth, constipation, blurry vision, cognitive changes, and BP elevation. Thorough discussion of surgical risks, benefits, and alternatives including, but not limited to bleeding/clots, infection, injury to nearby organs (urethra, bladder, ureters, bowel, blood vessels), mesh erosion, dyspareunia, de patti UUI, worsening EDWARD, recurrence, voiding dysfunction, and pain. Discussed pain mgmt and potential need for narcotics. Discussed addiction potential with narcotics. [place]  reviewed. All questions answered.   Pt will proceed bladder diet/drill, pelvic floor PT  Considering MUS, cysto (pending plan with [facility])    Follow up after PT    Dr. Yinka Velazquez

## 2023-08-11 NOTE — PATIENT INSTRUCTIONS
[email or web address]    Name Alexi Parra 36. Treinta Y Chun 2070 Group Physical Therapy [address] Group Physical Therapy [address] [facility] 750 [facility] Outpatient Rehab [address]10Th Floor [place] [address] Medical Group Physical Therapy 1130 W. 28 [facility] Pro [phone]    [facility] 500 N. 701 [facility] Avenue Minong [address]    [facility] 5 N. 340 [facility] 97 2111 217 [facility] at HaNovant Healtheti 5 [address]  [address] Vainupea [address] Medical Group Physical Therapy 9005 [facility] 5401 [facility] for ColleenCHRISTUS St[facility] Suite 5 [facility]   [facility] 7300 [facility] [address] Ave I   Body [facility] Physical Therapy [address] 1600 [facility] 2N [facility] 1912 [facility] & Physical Therapy [address] 7 6196 [facility] [phone] Aster Peterson Physical Therapy 6[address] N. [address] 130 W [facility] Physical Therapy Robertrt [address] Decland Rolando    Physical Therapy, PC 4849 N.  5280 Sepideh Landers [facility] Physical Therapy [address] 2001 [facility] at 7500 [facility] [address]j  [address] Francisco   [facility] [place] #7[address] 71 for [facility] 1200 S. [address] [phone] Harriet Jacob   92 [facility] Medical Group Physical Therapy 536 S. [address] Street/Danika Services    [facility] 707 [facility]   [facility] 111 W. [facility]. Range [phone] [facility] Physical Therapy P.C. 4772 [facility]   [address] [place] [address] 1100 [facility] Dustinfurt at [address] Bro Vega   92 [facility] Medical Group Physical Therapy [address]    2801 [facility] [address] [facility] [phone] [address] [place] [address] at [address] Suite Jaymie Mar Chalo 1490    [facility] Physical Therapy 3784 [facility] [address] [place] [address] Medical Group Physical Therapy 40 S. [facility] [phone]    [facility] 1411 9Th [facility] [phone] 229 [facility] of [address] [place] [address] Ave [address] Medical Group Physical Therapy 1504 [address]  120 [facility] Rehab Services 89 [facility] [address]   [facility]el[facility] Orth[facility] Physical Therapy Specialists [address]   2055 [facility] Group Physical Therapy [address]1983 S. 2200 [facility] 400 East [facility] Medical Group Physical Therapy 430 7727 [facility] Suite [facility] 496 1519 [facility] Stephanie Ulica 61 [address] for Healthy Living [address] Suite  [facility] Road 67 34 Place Hossein Whalen Physical Therapy [facility] [address] [address] Bjorn Dewitt Md, Dr 4146 [facility] Suite 401 [facility],[address] 300 59 [facility] Medical Group Physical Therapy [address]    2055 [facility] Group Physical Therapy 4003 [address] SuDeckerville Community Hospitaln [address] [id number] [phone]    Mariannega Mary: Ignacio [address] Delong [phone]    Rehabilitation Services at [facility] [address] [id number] [phone] 5601 [facility] Physical Therapy [address] 1650 [facility] [phone]    [facility] Physical Therapy 200 South [facility] 1013 [facility] Physical Therapy [address] [address] Tery Goldmann [phone] 295 [facility] Physical Therapy 715 N [facility] Av 120 [facility] 1818 [address] [phone]    [facility] 320 N.  1500 [facility] 61 [address] [phone]    100 [facility] [address] 104 100 [place],9D [address] [phone] 45 Morgan De La Cruz Medical Group Physical Therapy [address] [facility]: [facility] 104 N. [facility], 2nd Floor [facility] [phone]    [address] [place] [address] [phone] [facility] Physical Therapy [facility] 421 [facility] 114   [facility] Medical Group Physical Therapy 800 [facility],4Th Floor Unit D Hamilton [phone]    Rehabilitation Services at [facility] - [facility] 45 95 [facility] [address] 1700 Batsheva Smith   [facility] Hospitalpatomomartha Gutierrez   [facility] Phys[facility]otherapy 710 E. [address] 1801 [facility] Physical Therapy 1005 [address] [phone] Felicitas [place] [facility] [place] 6262 [facility] 114 [address] Ruano 6711 [facility],[address] 100 of [place] [address]268 S. 38935 [facility] 1610 [facility] Medical Group Physical Therapy 28426 S. 5401 [facility] [phone]    [facility] Medical [facility] Physical Therapy [address] S.  61478 [facility] 825 N [place] Alcira Elliott 75 809 [address] Physical Therapy 611 Tillman Alcira ÁLVAREZ [facility] [phone] [address] Medical Group Physical Therapy 2018 [facility] Suite 100 685 [facility] Hospitalr [address] Medical Group Physical Therapy 1000 [facility] Suite 401 Nw 42Nd [facility] [place] Physical Therapy 1[address] Suite A [facility] Hospitalkého 1521 Rehab Services e [facility] 130 [phone] [facility] 391 6834 [facility]

## 2023-08-21 ENCOUNTER — OFFICE VISIT (OUTPATIENT)
Dept: RHEUMATOLOGY | Facility: CLINIC | Age: 43
End: 2023-08-21

## 2023-08-21 ENCOUNTER — LAB ENCOUNTER (OUTPATIENT)
Dept: LAB | Facility: HOSPITAL | Age: 43
End: 2023-08-21
Attending: INTERNAL MEDICINE
Payer: COMMERCIAL

## 2023-08-21 VITALS
SYSTOLIC BLOOD PRESSURE: 105 MMHG | RESPIRATION RATE: 16 BRPM | DIASTOLIC BLOOD PRESSURE: 72 MMHG | BODY MASS INDEX: 28.99 KG/M2 | HEIGHT: 65 IN | HEART RATE: 88 BPM | WEIGHT: 174 LBS

## 2023-08-21 DIAGNOSIS — M79.18 MYOFASCIAL PAIN: Primary | ICD-10-CM

## 2023-08-21 DIAGNOSIS — M79.18 MYOFASCIAL PAIN: ICD-10-CM

## 2023-08-21 LAB
BASOPHILS # BLD AUTO: 0.03 X10(3) UL (ref 0–0.2)
BASOPHILS NFR BLD AUTO: 0.5 %
DEPRECATED RDW RBC AUTO: 45.1 FL (ref 35.1–46.3)
EOSINOPHIL # BLD AUTO: 0.05 X10(3) UL (ref 0–0.7)
EOSINOPHIL NFR BLD AUTO: 0.9 %
ERYTHROCYTE [DISTWIDTH] IN BLOOD BY AUTOMATED COUNT: 12.9 % (ref 11–15)
HCT VFR BLD AUTO: 38.9 %
HGB BLD-MCNC: 12.6 G/DL
IMM GRANULOCYTES # BLD AUTO: 0.02 X10(3) UL (ref 0–1)
IMM GRANULOCYTES NFR BLD: 0.4 %
LYMPHOCYTES # BLD AUTO: 1.9 X10(3) UL (ref 1–4)
LYMPHOCYTES NFR BLD AUTO: 34.2 %
MCH RBC QN AUTO: 30.9 PG (ref 26–34)
MCHC RBC AUTO-ENTMCNC: 32.4 G/DL (ref 31–37)
MCV RBC AUTO: 95.3 FL
MONOCYTES # BLD AUTO: 0.24 X10(3) UL (ref 0.1–1)
MONOCYTES NFR BLD AUTO: 4.3 %
NEUTROPHILS # BLD AUTO: 3.31 X10 (3) UL (ref 1.5–7.7)
NEUTROPHILS # BLD AUTO: 3.31 X10(3) UL (ref 1.5–7.7)
NEUTROPHILS NFR BLD AUTO: 59.7 %
PLATELET # BLD AUTO: 274 10(3)UL (ref 150–450)
RBC # BLD AUTO: 4.08 X10(6)UL
WBC # BLD AUTO: 5.6 X10(3) UL (ref 4–11)

## 2023-08-21 PROCEDURE — 82164 ANGIOTENSIN I ENZYME TEST: CPT

## 2023-08-21 PROCEDURE — 85025 COMPLETE CBC W/AUTO DIFF WBC: CPT | Performed by: INTERNAL MEDICINE

## 2023-08-21 PROCEDURE — 36415 COLL VENOUS BLD VENIPUNCTURE: CPT | Performed by: INTERNAL MEDICINE

## 2023-08-21 PROCEDURE — 86038 ANTINUCLEAR ANTIBODIES: CPT | Performed by: INTERNAL MEDICINE

## 2023-08-21 RX ORDER — GABAPENTIN 100 MG/1
100 CAPSULE ORAL 3 TIMES DAILY
Qty: 60 CAPSULE | Refills: 0 | Status: SHIPPED | OUTPATIENT
Start: 2023-08-21

## 2023-08-21 NOTE — PATIENT INSTRUCTIONS
You were seen for a lot of muscle pain especially her upper neck and back due to fibromyalgia  Wean off Lyrica Take 1 pill a day for 2 weeks then 1 pill every other day for 2 weeks then stop  When you are off Lyrica then start gabapentin 100 mg at night, take it on the days you are not working  If no side effects take it every day  We can increase this dose  Blood work today  I sent you to the physiatrist for the trigger point injections  Can see me in 2 months

## 2023-08-23 LAB — ACE: 21 U/L

## 2023-08-24 LAB — NUCLEAR IGG TITR SER IF: NEGATIVE {TITER}

## 2023-09-06 ENCOUNTER — HOSPITAL ENCOUNTER (OUTPATIENT)
Dept: ULTRASOUND IMAGING | Facility: HOSPITAL | Age: 43
Discharge: HOME | End: 2023-09-06
Attending: INTERNAL MEDICINE
Payer: COMMERCIAL

## 2023-09-06 DIAGNOSIS — I87.2 VENOUS INSUFFICIENCY OF BOTH LOWER EXTREMITIES: ICD-10-CM

## 2023-09-06 PROCEDURE — 93970 EXTREMITY STUDY: CPT | Performed by: INTERNAL MEDICINE

## 2023-09-07 ENCOUNTER — TELEPHONE (OUTPATIENT)
Dept: PHYSICAL MEDICINE AND REHAB | Facility: CLINIC | Age: 43
End: 2023-09-07

## 2023-09-07 ENCOUNTER — OFFICE VISIT (OUTPATIENT)
Dept: PHYSICAL MEDICINE AND REHAB | Facility: CLINIC | Age: 43
End: 2023-09-07
Payer: COMMERCIAL

## 2023-09-07 VITALS — HEART RATE: 76 BPM | WEIGHT: 175 LBS | BODY MASS INDEX: 29 KG/M2 | OXYGEN SATURATION: 100 %

## 2023-09-07 DIAGNOSIS — M79.18 MYOFASCIAL MUSCLE PAIN: Primary | ICD-10-CM

## 2023-09-07 PROCEDURE — 20553 NJX 1/MLT TRIGGER POINTS 3/>: CPT | Performed by: PHYSICAL MEDICINE & REHABILITATION

## 2023-09-07 PROCEDURE — 99204 OFFICE O/P NEW MOD 45 MIN: CPT | Performed by: PHYSICAL MEDICINE & REHABILITATION

## 2023-09-07 RX ORDER — TRIAMCINOLONE ACETONIDE 40 MG/ML
20 INJECTION, SUSPENSION INTRA-ARTICULAR; INTRAMUSCULAR ONCE
Status: COMPLETED | OUTPATIENT
Start: 2023-09-07 | End: 2023-09-07

## 2023-09-07 RX ORDER — LIDOCAINE HYDROCHLORIDE 10 MG/ML
4.5 INJECTION, SOLUTION INFILTRATION; PERINEURAL ONCE
Status: COMPLETED | OUTPATIENT
Start: 2023-09-07 | End: 2023-09-07

## 2023-09-07 NOTE — PATIENT INSTRUCTIONS
You may choose to message me in 1-2 weeks to start duloxetine after you have gone back on the lyrica and things have gone back to normal, if you choose to start the medication. It would be once daily dosing medication. I would recommend you start it on a day when you are off in case it gives you side effects. If it causes mild drowsiness/dizziness/nausea/stomach upset you should continue the duloxetine as those side effects will probably subside; if the side effects are significant you would stop the duloxetine.

## 2023-09-07 NOTE — H&P
[facility] MEDICAL [facility], [place], [place]    History and Physical    Erin Vargas Patient Status:  No patient class for patient encounter    1980 MRN [id number]   Location [facility] 2550  [address] , [place] Attending No att. providers found   Hosp Day # 0 PCP Kathy Guzman MD     Date:  2023    History provided by:patient  HPI:   Patient presents with:  New Patient: New R handed patient is here for neck and upper back pain. She has fibromyalgia. She's had the pain for about 8 years. No imaging or injections. PT in the past with minimal improvement. Acupuncture in the past that helped. Pain in base of skull radiates to neck and upper back, to both shoulder blades. Denies t/n. Ibuprofen prn. Pain .     36year old female with history of gastritis, H. Pylori infection, fibromyalgia presents with diffuse axial back pain most prominently in the upper shoulders and left scapula; patient is right handed. She began having diffuse back pain about 2 months after the birth of her son about 8 years ago that she initially attributed to the bending, lifting and stooping done while taking care of her new child, but the symptoms worsened and never went away. She did acupuncture on and off, and then when she had persistent symptoms in  did acupuncture twice daily for most of the year. The acupuncture helped in the short-term, but without acupuncture symptoms with eventually returned to baseline. Has done physical therapy years ago with a similar outcome. Has been on Lyrica with mild benefit. More recently saw rheumatologist Dr. Sofía Barr who had her stop the Lyrica and prescribed the patient gabapentin at nighttime. She has taken the gabapentin at nighttime for about 4 nights and feels that the medication makes her overly groggy, she is not comfortable taking it on days when she has to work.   Has also had Flexeril in the past.  She has no history of spine disorders that required injections or surgeries. She was referred here primarily to consider trigger point injections. She works as a PSR in the "[employer] Worst of the pain is in the bilateral upper shoulders/trapezius, constant aching, along the left neck, and along the left medial border of the scapula. History     Past Medical History:   Diagnosis Date    Anxiety state     Fibromyalgia     Gastritis 12/19/2017    Internal hemorrhoids 12/19/2017     Past Surgical History:   Procedure Laterality Date    COLONOSCOPY N/A 12/19/2017    Procedure: COLONOSCOPY;  Surgeon: Jerel Puckett MD;  Location: [address] [place] [address],[facility] 1 ENDO    COLONOSCOPY      EXCISION TURBINATE,SUBMUCOUS  09/12/2018    NASAL SCOPY,REMV PART ETHMOID  09/12/2018    NASAL SCOPY,RMV TISS [place] SINUS  09/12/2018    NEEDLE BIOPSY RIGHT Right 1999    unsure of where on right breast age 20    REPAIR OF NASAL SEPTUM  09/12/2018    UPPER GI ENDOSCOPY,EXAM       Family History   Problem Relation Age of Onset    Diabetes Father     Hypertension Father     Diabetes Mother     Hypertension Mother     Breast Cancer Neg     Ovarian Cancer Neg      Social History:  Social History     Socioeconomic History    Marital status:    Tobacco Use    Smoking status: Never    Smokeless tobacco: Never   Vaping Use    Vaping Use: Never used   Substance and Sexual Activity    Alcohol use: Yes     Comment: social    Drug use: No     Allergies/Medications: Allergies: No Known Allergies    Review of Systems:   ROS: Denies fever, chills, unintended weight loss, abnormal balance    Vital Signs:  Pulse 76, weight 175 lb (79.4 kg), SpO2 100 %, not currently breastfeeding. Physical Exam  Vitals and nursing note reviewed. Constitutional:       Appearance: Normal appearance. HENT:      Head: Normocephalic and atraumatic.    Eyes:      Conjunctiva/sclera: Conjunctivae normal.   Pulmonary:      Effort: Pulmonary effort is normal.   Musculoskeletal:      Comments: Lumbar range of motion: No pain with flexion or extension    Palpation: ttp bilateral upper trapezius, lower trapezius, left cervical paraspinals with local radiation of pain upon palpation   Skin:     General: Skin is warm and dry. Neurological:      Mental Status: She is alert. Comments: 2/4 BUE and LE reflexes  De La Garza test negative b/l  No ankle myoclonus b/l  Normal gait and balance       Results:   XR lumbar spine 2020 independently reviewed, normal    Assessment/Plan:   1) diffuse myofascial pain syndrome  2) h/o gastritis (no ulcers)    Recommend left cervical paraspinal, b/l upper and lower trapezius trigger point injections. Done today, see separate note for details. Side effects from gabapentin; suggest she go back to lyrica. Once she is comfortable consider trial of duloxetine; can be done by me or rheumatology; if I prescribe would start with 30mg for at least 6 weeks if no side effects. We discussed possible side effects of the medication; if still having significant symptoms she may message me back if she has no further questions about the duloxetine and I will send the prescription to her pharmacy. See separate note for injection details. She will send me a MyChart status update or contact the office in 2 weeks and let me know how she is doing, and if she would like to start the duloxetine.     Almas Gamino, DO  9/7/2023

## 2023-09-07 NOTE — TELEPHONE ENCOUNTER
Initiated authorization for Left cervical paraspinal, bilateral upper and lower trapezius trigger point injections CPT/HCPCS 96892,  with [employer]  Status: Approved-authorization is not required per health plan        Inj done in office

## 2023-09-07 NOTE — PROCEDURES
Procedure note: Trigger point injections  Procedure Diagnosis: Myofascial pain    Summary of Procedure:   Risks and benefits of the procedure were discussed with the patient and consent was obtained. Trigger points were palpated and marked along the left cervical paraspinals, b/l upper and lower trapezius, 5 points in total. Using betadine swabs, injection sites were cleaned in sterile fashion. Using a 27 gauge 1 1/2\" needle 20mg Kenolog diluted into 4.5cc of 1% Lidocaine was injected into the trigger points with 1 ml into each trigger point. There was negative aspiration of heme and no paresthesias were elicited. Patient tolerated the procedure well. She will message me back in 2 weeks with a status update.    Deborah Shanks DO  Physical Medicine and [address]

## 2023-11-22 ENCOUNTER — OFFICE VISIT (OUTPATIENT)
Dept: RHEUMATOLOGY | Facility: CLINIC | Age: 43
End: 2023-11-22
Payer: COMMERCIAL

## 2023-11-22 VITALS
RESPIRATION RATE: 16 BRPM | HEIGHT: 65 IN | SYSTOLIC BLOOD PRESSURE: 104 MMHG | HEART RATE: 73 BPM | DIASTOLIC BLOOD PRESSURE: 73 MMHG | BODY MASS INDEX: 29.16 KG/M2 | WEIGHT: 175 LBS

## 2023-11-22 DIAGNOSIS — M79.18 MYOFASCIAL PAIN: Primary | ICD-10-CM

## 2023-11-22 PROCEDURE — 99214 OFFICE O/P EST MOD 30 MIN: CPT | Performed by: INTERNAL MEDICINE

## 2023-11-22 PROCEDURE — 3074F SYST BP LT 130 MM HG: CPT | Performed by: INTERNAL MEDICINE

## 2023-11-22 PROCEDURE — 3008F BODY MASS INDEX DOCD: CPT | Performed by: INTERNAL MEDICINE

## 2023-11-22 PROCEDURE — 3078F DIAST BP <80 MM HG: CPT | Performed by: INTERNAL MEDICINE

## 2023-11-22 RX ORDER — ACETAMINOPHEN AND CODEINE PHOSPHATE 300; 30 MG/1; MG/1
1 TABLET ORAL DAILY PRN
Qty: 30 TABLET | Refills: 0 | Status: SHIPPED | OUTPATIENT
Start: 2023-11-22

## 2023-11-22 NOTE — PROGRESS NOTES
Inder Reyes is a 39year old female. HPI:     Chief Complaint   Patient presents with    Follow - Up     Myofascial pain      Medication Follow-Up    Lab Results       I had the pleasure of seeing Inder Reyes on 11/22/2023 for follow up Myofascial pain/fibromyalgia     Current medications:  Lyrica 25 mg in AM and 50 mg at night- started winter 2022  Flexeril as needed   Past medications:  Gabapentin- had s/e   Blood work:  Neg LILIA, ESR, CRP, RF, CCP, ACE level, Celiac Ab (2016)    Interval History: This is a 37 yo F with hx of endometriosis, pelvic pain presents with diffuse myalgias and fatigue. She reports feeling inflamed all the time. She has chronic muscle pain involving her neck and upper shoulders. They feel like they are burning constantly. She also has worsening upper neck and trapezius pain in the past 2 months. Recent blood work showed normal ESR and CRP. She feels swollen all over. No joint pain or swelling. No history of psoriasis or lower back pain. Recently about 6 weeks ago she had a rash on her face, thought it was eczema. She used over-the-counter creams and it resolved. She also has been getting axillary lymphadenopathy over the past 3 months, it is happened 4 times. It will resolve within 24 hours. No recent fever or infection. No history of uveitis. Reports some dry eyes after having Lasix surgery. No history of serositis, DVT or PE, miscarriages or RP. She takes Lyrica for the past 8 months, takes intermittently as she works nights twice a week. No family history of autoimmune disease  She has tried muscle relaxers which makes her too sleepy. She also is tried acupuncture for her pain, helps minimally. 11/22/2023:  Presents for follow-up of Myofascial pain/fibromyalgia   Workup for inflammatory arthritis or autoimmune causes were negative  She was weaned off Lyrica and was on put on gabapentin 100 mg nightly but had more pain so went back on Lyrica.   Had s/e with gabapentin, felt like zommbie and slept for 5 days  Was also seen by physiatry Dr. Dennise Alegria for trigger point injections in the cervical paraspinal region and trapezius region and helped minimally. She had them done on September 7, 2023  Still have pain in the upper back region  Going to try trigger point injection again and dry needling to see if that will help. Accupuncture has helped the most regarding the pain            HISTORY:  Past Medical History:   Diagnosis Date    Anxiety state     Fibromyalgia     Gastritis 12/19/2017    Internal hemorrhoids 12/19/2017      Social Hx Reviewed   Family Hx Reviewed     Medications (Active prior to today's visit):  Current Outpatient Medications   Medication Sig Dispense Refill    gabapentin 100 MG Oral Cap Take 1 capsule (100 mg total) by mouth 3 (three) times daily. 60 capsule 0    Levonorgest-Eth Estrad 91-Day (SEASONIQUE) 0.15-0.03 &0.01 MG Oral Tab Take 1 tablet by mouth daily. 90 tablet 3    montelukast 10 MG Oral Tab Take 1 tablet (10 mg total) by mouth nightly. 30 tablet 11    fluticasone propionate 50 MCG/ACT Nasal Suspension 2 sprays by Each Nare route daily. 1 each 11    cyclobenzaprine 10 MG Oral Tab       IBUPROFEN 800 MG Oral Tab TAKE 1 TABLET BY MOUTH EVERY 6 HOURS AS NEEDED FOR PAIN 60 tablet 1     .cmed  Allergies:  No Known Allergies      ROS:   All other ROS are negative. PHYSICAL EXAM:   GEN: AAOx3, NAD  HEENT: EOMI, PERRLA, no injection or icterus, oral mucosa moist, no oral lesions. No lymphadenopathy. No facial rash  CVS: RRR, no murmurs rubs or gallops. Equal 2+ distal pulses. LUNGS: CTAB, no increased work of breathing  ABDOMEN:  soft NT/ND, +BS, no HSM  SKIN: No rashes or skin lesions. No nail findings  MSK:  TTP in cervical paraspinal region and trapezius region  No swelling or synovitis on exam  +FROM in all joints   NEURO: Cranial nerves II-XII intact grossly.  5/5 strength throughout in both upper and lower extremities, sensation intact. PSYCH: normal mood       LABS:     Component      Latest Ref Rng 8/21/2023   WBC      4.0 - 11.0 x10(3) uL 5.6    RBC      3.80 - 5.30 x10(6)uL 4.08    Hemoglobin      12.0 - 16.0 g/dL 12.6    Hematocrit      35.0 - 48.0 % 38.9    MCV      80.0 - 100.0 fL 95.3    MCH      26.0 - 34.0 pg 30.9    MCHC      31.0 - 37.0 g/dL 32.4    RDW-SD      35.1 - 46.3 fL 45.1    RDW      11.0 - 15.0 % 12.9    Platelet Count      394.1 - 450.0 10(3)uL 274.0    Prelim Neutrophil Abs      1.50 - 7.70 x10 (3) uL 3.31    Neutrophils Absolute      1.50 - 7.70 x10(3) uL 3.31    Lymphocytes Absolute      1.00 - 4.00 x10(3) uL 1.90    Monocytes Absolute      0.10 - 1.00 x10(3) uL 0.24    Eosinophils Absolute      0.00 - 0.70 x10(3) uL 0.05    Basophils Absolute      0.00 - 0.20 x10(3) uL 0.03    Immature Granulocyte Absolute      0.00 - 1.00 x10(3) uL 0.02    Neutrophils %      % 59.7    Lymphocytes %      % 34.2    Monocytes %      % 4.3    Eosinophils %      % 0.9    Basophils %      % 0.5    Immature Granulocyte %      % 0.4    LILIA SCREEN WITH REFLEX (S)      Negative  Negative    ACE      14 - 82 U/L 21        Imaging:     XR Lumbar spine 2020:  1. Questionable minimal mid lumbar spine levoscoliosis versus positioning. Otherwise unremarkable study. No fracture, subluxation, pars defect or significant spondylosis is seen. ASSESSMENT/PLAN:     Myofascial pain/fibromyalgia  - She had work-up in the past showing negative LILIA, RF and CCP. Also normal inflammation markers  - A lot of her pain is in her upper back and neck region. She is tried acupuncture which helps but at times hard to do due to her work schedule  - She was switched from Lyrica to gabapentin but had side effects of making her too sleepy. She is back on Lyrica 25 mg in the morning and 50 mg at night. She will try increasing it to 50 mg twice a day  - She also tried trigger point injections in September, helped minimally. She will try them again.   She also would like to see physical therapy for dry needling.   Referral placed  - She requested a prescription of Tylenol with codeine, I stated I will give her a month supply    Pt will f/u in 4-5 mos     Stanislav Hamilton MD  11/22/2023   10:27 AM

## 2023-12-02 ENCOUNTER — PATIENT MESSAGE (OUTPATIENT)
Dept: RHEUMATOLOGY | Facility: CLINIC | Age: 43
End: 2023-12-02

## 2023-12-02 DIAGNOSIS — M79.18 MYOFASCIAL PAIN: ICD-10-CM

## 2023-12-02 RX ORDER — ACETAMINOPHEN AND CODEINE PHOSPHATE 300; 30 MG/1; MG/1
1 TABLET ORAL DAILY PRN
Qty: 30 TABLET | Refills: 0 | Status: SHIPPED | OUTPATIENT
Start: 2023-12-02

## 2023-12-02 NOTE — TELEPHONE ENCOUNTER
LOV: 11/22/23  Last Refilled:#30, 0rf 11/22/23    Future Appointments   Date Time Provider Demarco Ricksi   12/7/2023 11:40 AM Ana Lilia Bansal DO PM&R [facility]   1/10/2024 11:00 AM No Henriquez MD [facility]     ASSESSMENT/PLAN:      Myofascial pain/fibromyalgia  - She had work-up in the past showing negative LILIA, RF and CCP. Also normal inflammation markers  - A lot of her pain is in her upper back and neck region. She is tried acupuncture which helps but at times hard to do due to her work schedule  - She was switched from Lyrica to gabapentin but had side effects of making her too sleepy. She is back on Lyrica 25 mg in the morning and 50 mg at night. She will try increasing it to 50 mg twice a day  - She also tried trigger point injections in September, helped minimally. She will try them again. She also would like to see physical therapy for dry needling. Referral placed  - She requested a prescription of Tylenol with codeine, I stated I will give her a month supply     Pt will f/u in 4-5 mos      Lidia Manzano MD  11/22/2023   10:27 AM  Please advise.

## 2023-12-02 NOTE — TELEPHONE ENCOUNTER
From: Moo Rogers  To: Perri Coe  Sent: 12/2/2023 11:34 AM CST  Subject: Medication    The pharmacy where the rx was sent for the tylenol w/codeine, is out of stock of the medication and is on back order. They suggested it to be refilled at a different location that currently has it in stock. However, a new rx needs to be called in. Its the [facility] in [employer]  [address] [place] [address] [phone]    Are you able to send a new prescription over or call it in?      Thank you,  Moni Beyer

## 2023-12-04 ENCOUNTER — PATIENT MESSAGE (OUTPATIENT)
Dept: RHEUMATOLOGY | Facility: CLINIC | Age: 43
End: 2023-12-04

## 2023-12-04 ENCOUNTER — TELEPHONE (OUTPATIENT)
Dept: PHYSICAL THERAPY | Facility: HOSPITAL | Age: 43
End: 2023-12-04

## 2023-12-04 DIAGNOSIS — M79.18 MYOFASCIAL PAIN: Primary | ICD-10-CM

## 2023-12-14 ENCOUNTER — TELEPHONE (OUTPATIENT)
Dept: RHEUMATOLOGY | Facility: CLINIC | Age: 43
End: 2023-12-14

## 2023-12-14 NOTE — TELEPHONE ENCOUNTER
Current Outpatient Medications   Medication Sig Dispense Refill    acetaminophen-codeine (TYLENOL WITH CODEINE #3) 300-30 MG Oral Tab Take 1 tablet by mouth daily as needed.  30 tablet 0           Key: [id number]

## 2023-12-15 NOTE — TELEPHONE ENCOUNTER
PA approved from 12/15/2023 to 6/15/2024 PA# H and W [employer] [id number]

## 2023-12-20 ENCOUNTER — OFFICE VISIT (OUTPATIENT)
Dept: PHYSICAL MEDICINE AND REHAB | Facility: CLINIC | Age: 43
End: 2023-12-20
Payer: COMMERCIAL

## 2023-12-20 VITALS — BODY MASS INDEX: 29.16 KG/M2 | HEIGHT: 65 IN | WEIGHT: 175 LBS | HEART RATE: 97 BPM | OXYGEN SATURATION: 98 %

## 2023-12-20 DIAGNOSIS — M79.18 MYOFASCIAL MUSCLE PAIN: Primary | ICD-10-CM

## 2023-12-20 PROCEDURE — 99213 OFFICE O/P EST LOW 20 MIN: CPT | Performed by: PHYSICAL MEDICINE & REHABILITATION

## 2023-12-20 PROCEDURE — 3008F BODY MASS INDEX DOCD: CPT | Performed by: PHYSICAL MEDICINE & REHABILITATION

## 2023-12-20 PROCEDURE — 20553 NJX 1/MLT TRIGGER POINTS 3/>: CPT | Performed by: PHYSICAL MEDICINE & REHABILITATION

## 2023-12-20 RX ORDER — TRIAMCINOLONE ACETONIDE 40 MG/ML
20 INJECTION, SUSPENSION INTRA-ARTICULAR; INTRAMUSCULAR ONCE
Status: COMPLETED | OUTPATIENT
Start: 2023-12-20 | End: 2023-12-20

## 2023-12-20 RX ORDER — LIDOCAINE HYDROCHLORIDE 10 MG/ML
5.5 INJECTION, SOLUTION INFILTRATION; PERINEURAL ONCE
Status: COMPLETED | OUTPATIENT
Start: 2023-12-20 | End: 2023-12-20

## 2023-12-27 ENCOUNTER — TELEPHONE (OUTPATIENT)
Dept: PHYSICAL MEDICINE AND REHAB | Facility: CLINIC | Age: 43
End: 2023-12-27

## 2023-12-27 NOTE — TELEPHONE ENCOUNTER
Initiated retro authorization for Bilateral lower trapezius and thoracic paraspinals TPIs CPT/HCPCS 40237 () with [facility]  Clinicals faxed to [phone]  Status: pending

## 2024-01-10 ENCOUNTER — OFFICE VISIT (OUTPATIENT)
Dept: OBGYN CLINIC | Facility: CLINIC | Age: 44
End: 2024-01-10
Payer: COMMERCIAL

## 2024-01-10 ENCOUNTER — TELEPHONE (OUTPATIENT)
Dept: OBGYN CLINIC | Facility: CLINIC | Age: 44
End: 2024-01-10

## 2024-01-10 VITALS
SYSTOLIC BLOOD PRESSURE: 102 MMHG | DIASTOLIC BLOOD PRESSURE: 73 MMHG | BODY MASS INDEX: 29 KG/M2 | HEART RATE: 77 BPM | WEIGHT: 177 LBS

## 2024-01-10 DIAGNOSIS — N95.1 PERIMENOPAUSE: ICD-10-CM

## 2024-01-10 DIAGNOSIS — N80.9 ENDOMETRIOSIS: Primary | ICD-10-CM

## 2024-01-10 PROCEDURE — 99213 OFFICE O/P EST LOW 20 MIN: CPT | Performed by: OBSTETRICS & GYNECOLOGY

## 2024-01-10 PROCEDURE — 3078F DIAST BP <80 MM HG: CPT | Performed by: OBSTETRICS & GYNECOLOGY

## 2024-01-10 PROCEDURE — 3074F SYST BP LT 130 MM HG: CPT | Performed by: OBSTETRICS & GYNECOLOGY

## 2024-01-10 NOTE — TELEPHONE ENCOUNTER
Contacted Mariama at [employer] who states Bilateral lower trapezius and thoracic paraspinals TPIs claim was paid.  This encounter closed

## 2024-01-10 NOTE — PROGRESS NOTES
Myranda COOPER Chatman    9/19/1980       Chief Complaint   Patient presents with    Consult     Pt still have issues with endometriosis. Per pt, had previously discussed potential surgery and would like to move forward with it.      Pt c/o bloating, pelvic cramping that radiates to her lower back- sx she attributes to endo and wants to discuss hysterectomy.  When I saw her in February of this year I placed her on Seasonique but she stopped it after only taking it for 4 months due to she did not think that it was helping much with her symptoms.  She stopped the medication in August 2023 and then had a menses on October 20 and then on December 27.  We discussed that her bloating may not be due to endometriosis and could be a separate GI issue.  I asked her to keep a food diary and review which foods are making her feel gas pain.  She states that occasionally the gas goes into her vagina.  We discussed that last year her hormone testing revealed that she was in the perimenopausal range, her FSH level was in the menopausal range where the other 2 were in the premenopausal range.  I asked that she wait for now before proceeding to surgery because she could be going into menopause and then her symptoms if due to endometriosis should lessen without the risk of surgery.  We discussed that her risk of surgery is increased due to her devious abdominal surgeries as well as the fact that endometriosis causes it is only scar tissue which can be difficult to lyse without causing injury to bowel or bladder.  I offered Lupron or Orilissa treatment and explained the risks and benefits of this treatment for the rest of this year to see if this helps her symptoms.  Patient prefers to take the injection over a daily pill.  We discussed that if at the end of this year she still wants to proceed with hysterectomy but I recommended that she consult with a robotic surgeon as it may been easier to deal with any significant adhesions with the  robot rather than straight laparoscopy.  Patient expressed understanding.  Will precertified her for Lupron.    Patient did see Dr. Gottlieb and she states that it was indicated to her that if patient underwent a hysterectomy that  [facility] would be able to do some urologic procedures to help with her incontinence issues.  Past Medical History:   Diagnosis Date    Anxiety     Anxiety state     Fibromyalgia     Gastritis 2017    Internal hemorrhoids 2017       Past Surgical History:   Procedure Laterality Date    COLONOSCOPY N/A 2017    Procedure: COLONOSCOPY;  Surgeon: Maureen Begum MD;  [facility]    COLONOSCOPY      EXCISION TURBINATE,SUBMUCOUS  2018    NASAL SCOPY,REMV PART ETHMOID  2018    NASAL SCOPY,RMV TISS MAXILL SINUS  2018    NEEDLE BIOPSY RIGHT Right     unsure of where on right breast age 19    REPAIR OF NASAL SEPTUM  2018    UPPER GI ENDOSCOPY,EXAM         Menstrual History:  OB History    Para Term  AB Living   2 2 0 0 0 2   SAB IAB Ectopic Multiple Live Births   0 0 0 0 2        Patient's last menstrual period was 2023 (exact date).        Pap Date: 23  Pap Result Notes: PAP NEG.HPV NEG.  Follow Up Recommendation: MAMMOGRAM 23 B/L PROB BENIGN     PHYSICAL EXAM:       Constitutional: well developed, well nourished    Psychiatric:  Oriented to time, place, person and situation. Appropriate mood and affect        Myranda was seen today for consult.    Diagnoses and all orders for this visit:    Endometriosis    Perimenopause    Plan as above- will precert for Lupron

## 2024-01-10 NOTE — TELEPHONE ENCOUNTER
Please precert for lupron for endometriosis, the 1 month injection for 3 months then the 3 month injection x 3.

## 2024-01-11 ENCOUNTER — TELEPHONE (OUTPATIENT)
Dept: OBGYN CLINIC | Facility: CLINIC | Age: 44
End: 2024-01-11

## 2024-01-11 NOTE — TELEPHONE ENCOUNTER
All information listed below faxed to [employer] including cover sheet 25pgs faxed.     Await fax confirmation

## 2024-01-11 NOTE — TELEPHONE ENCOUNTER
Called [facility] at [phone] and spoke to Yelena who stated PA is needed for Lupron.    States on a fax cover sheet will need information listed below    Procedure codes    Diagnosis codes    Duration of treatment and how often    Will need a doctors letter with reasoning of why pt needs injection    Any office notes,labs, imaging    Once all this obtain we are to fax to [phone] with ATTN: Precertification inquiry    And once this is obtain it can take up to 15 business to receive a answer    To [facility] to please write letter.

## 2024-01-29 ENCOUNTER — MED REC SCAN ONLY (OUTPATIENT)
Dept: RHEUMATOLOGY | Facility: CLINIC | Age: 44
End: 2024-01-29

## 2024-02-02 ENCOUNTER — LAB ENCOUNTER (OUTPATIENT)
Dept: LAB | Age: 44
End: 2024-02-02
Attending: INTERNAL MEDICINE
Payer: COMMERCIAL

## 2024-02-02 ENCOUNTER — OFFICE VISIT (OUTPATIENT)
Dept: INTERNAL MEDICINE CLINIC | Facility: CLINIC | Age: 44
End: 2024-02-02
Payer: COMMERCIAL

## 2024-02-02 VITALS
SYSTOLIC BLOOD PRESSURE: 100 MMHG | BODY MASS INDEX: 29.49 KG/M2 | WEIGHT: 177 LBS | DIASTOLIC BLOOD PRESSURE: 60 MMHG | HEIGHT: 65 IN

## 2024-02-02 DIAGNOSIS — L65.9 HAIR THINNING: ICD-10-CM

## 2024-02-02 DIAGNOSIS — M79.7 FIBROMYALGIA: ICD-10-CM

## 2024-02-02 DIAGNOSIS — Z00.00 WELLNESS EXAMINATION: Primary | ICD-10-CM

## 2024-02-02 DIAGNOSIS — Z00.00 WELLNESS EXAMINATION: ICD-10-CM

## 2024-02-02 DIAGNOSIS — E78.49 OTHER HYPERLIPIDEMIA: ICD-10-CM

## 2024-02-02 LAB
ALBUMIN SERPL-MCNC: 4.4 G/DL (ref 3.2–4.8)
ALBUMIN/GLOB SERPL: 1.9 {RATIO} (ref 1–2)
ALP LIVER SERPL-CCNC: 52 U/L
ALT SERPL-CCNC: 13 U/L
ANION GAP SERPL CALC-SCNC: 5 MMOL/L (ref 0–18)
AST SERPL-CCNC: 19 U/L (ref ?–34)
BASOPHILS # BLD AUTO: 0.05 X10(3) UL (ref 0–0.2)
BASOPHILS NFR BLD AUTO: 0.7 %
BILIRUB SERPL-MCNC: 0.4 MG/DL (ref 0.3–1.2)
BUN BLD-MCNC: 8 MG/DL (ref 9–23)
BUN/CREAT SERPL: 10.8 (ref 10–20)
CALCIUM BLD-MCNC: 9.4 MG/DL (ref 8.7–10.4)
CHLORIDE SERPL-SCNC: 106 MMOL/L (ref 98–112)
CHOLEST SERPL-MCNC: 199 MG/DL (ref ?–200)
CO2 SERPL-SCNC: 28 MMOL/L (ref 21–32)
CREAT BLD-MCNC: 0.74 MG/DL
DEPRECATED HBV CORE AB SER IA-ACNC: 44.7 NG/ML
DEPRECATED RDW RBC AUTO: 42.7 FL (ref 35.1–46.3)
EGFRCR SERPLBLD CKD-EPI 2021: 102 ML/MIN/1.73M2 (ref 60–?)
EOSINOPHIL # BLD AUTO: 0.09 X10(3) UL (ref 0–0.7)
EOSINOPHIL NFR BLD AUTO: 1.2 %
ERYTHROCYTE [DISTWIDTH] IN BLOOD BY AUTOMATED COUNT: 12.3 % (ref 11–15)
EST. AVERAGE GLUCOSE BLD GHB EST-MCNC: 103 MG/DL (ref 68–126)
FASTING PATIENT LIPID ANSWER: NO
FASTING STATUS PATIENT QL REPORTED: NO
GLOBULIN PLAS-MCNC: 2.3 G/DL (ref 2.8–4.4)
GLUCOSE BLD-MCNC: 92 MG/DL (ref 70–99)
HBA1C MFR BLD: 5.2 % (ref ?–5.7)
HCT VFR BLD AUTO: 37 %
HDLC SERPL-MCNC: 70 MG/DL (ref 40–59)
HGB BLD-MCNC: 12.6 G/DL
IMM GRANULOCYTES # BLD AUTO: 0.02 X10(3) UL (ref 0–1)
IMM GRANULOCYTES NFR BLD: 0.3 %
LDLC SERPL CALC-MCNC: 102 MG/DL (ref ?–100)
LYMPHOCYTES # BLD AUTO: 2.43 X10(3) UL (ref 1–4)
LYMPHOCYTES NFR BLD AUTO: 33.2 %
MCH RBC QN AUTO: 31.9 PG (ref 26–34)
MCHC RBC AUTO-ENTMCNC: 34.1 G/DL (ref 31–37)
MCV RBC AUTO: 93.7 FL
MONOCYTES # BLD AUTO: 0.42 X10(3) UL (ref 0.1–1)
MONOCYTES NFR BLD AUTO: 5.7 %
NEUTROPHILS # BLD AUTO: 4.32 X10 (3) UL (ref 1.5–7.7)
NEUTROPHILS # BLD AUTO: 4.32 X10(3) UL (ref 1.5–7.7)
NEUTROPHILS NFR BLD AUTO: 58.9 %
NONHDLC SERPL-MCNC: 129 MG/DL (ref ?–130)
OSMOLALITY SERPL CALC.SUM OF ELEC: 286 MOSM/KG (ref 275–295)
PLATELET # BLD AUTO: 303 10(3)UL (ref 150–450)
POTASSIUM SERPL-SCNC: 3.9 MMOL/L (ref 3.5–5.1)
PROT SERPL-MCNC: 6.7 G/DL (ref 5.7–8.2)
RBC # BLD AUTO: 3.95 X10(6)UL
SODIUM SERPL-SCNC: 139 MMOL/L (ref 136–145)
T3 SERPL-MCNC: 1.49 NG/ML (ref 0.6–1.81)
T4 FREE SERPL-MCNC: 1.1 NG/DL (ref 0.8–1.7)
TRIGL SERPL-MCNC: 157 MG/DL (ref 30–149)
TSI SER-ACNC: 2.41 MIU/ML (ref 0.55–4.78)
VLDLC SERPL CALC-MCNC: 26 MG/DL (ref 0–30)
WBC # BLD AUTO: 7.3 X10(3) UL (ref 4–11)

## 2024-02-02 PROCEDURE — 84480 ASSAY TRIIODOTHYRONINE (T3): CPT

## 2024-02-02 PROCEDURE — 85025 COMPLETE CBC W/AUTO DIFF WBC: CPT

## 2024-02-02 PROCEDURE — 80053 COMPREHEN METABOLIC PANEL: CPT

## 2024-02-02 PROCEDURE — 3074F SYST BP LT 130 MM HG: CPT | Performed by: INTERNAL MEDICINE

## 2024-02-02 PROCEDURE — 84439 ASSAY OF FREE THYROXINE: CPT

## 2024-02-02 PROCEDURE — 3008F BODY MASS INDEX DOCD: CPT | Performed by: INTERNAL MEDICINE

## 2024-02-02 PROCEDURE — 82728 ASSAY OF FERRITIN: CPT

## 2024-02-02 PROCEDURE — 36415 COLL VENOUS BLD VENIPUNCTURE: CPT

## 2024-02-02 PROCEDURE — 80061 LIPID PANEL: CPT

## 2024-02-02 PROCEDURE — 83036 HEMOGLOBIN GLYCOSYLATED A1C: CPT

## 2024-02-02 PROCEDURE — 3078F DIAST BP <80 MM HG: CPT | Performed by: INTERNAL MEDICINE

## 2024-02-02 PROCEDURE — 99396 PREV VISIT EST AGE 40-64: CPT | Performed by: INTERNAL MEDICINE

## 2024-02-02 PROCEDURE — 84443 ASSAY THYROID STIM HORMONE: CPT

## 2024-02-02 RX ORDER — PREGABALIN 25 MG/1
CAPSULE ORAL
COMMUNITY
Start: 2023-09-19 | End: 2024-02-02

## 2024-02-02 RX ORDER — PREGABALIN 25 MG/1
CAPSULE ORAL
Qty: 90 CAPSULE | Refills: 3 | Status: SHIPPED | OUTPATIENT
Start: 2024-02-02

## 2024-02-02 NOTE — PROGRESS NOTES
Subjective:   Myranda Chatman is a 43 year old female who presents for Physical     Patient here for a wellness examination and clinical fu on fibromyalgia, currently on Lyrica.  Also has endometriosis which she sees gynecology for.  Had normal colonoscopy in 2018.  History/Other:    Chief Complaint Reviewed and Verified  No Further Nursing Notes to   Review  Medications Reviewed  Problem List Reviewed         Tobacco:  She has never smoked tobacco.    Current Outpatient Medications   Medication Sig Dispense Refill    pregabalin 25 MG Oral Cap TAKE 1 CAPSULE BY MOUTH EVERY MORNING AND 2 CAPS OF BEDTIME      acetaminophen-codeine (TYLENOL WITH CODEINE #3) 300-30 MG Oral Tab Take 1 tablet by mouth daily as needed. 30 tablet 0    montelukast 10 MG Oral Tab Take 1 tablet (10 mg total) by mouth nightly. 30 tablet 11    fluticasone propionate 50 MCG/ACT Nasal Suspension 2 sprays by Each Nare route daily. 1 each 11    cyclobenzaprine 10 MG Oral Tab       IBUPROFEN 800 MG Oral Tab TAKE 1 TABLET BY MOUTH EVERY 6 HOURS AS NEEDED FOR PAIN 60 tablet 1         Review of Systems:  Review of Systems   Genitourinary:  Positive for pelvic pain.   Musculoskeletal:  Positive for myalgias.   Skin:         Hair thinning         Objective:   /60   Ht 5' 5\" (1.651 m)   Wt 177 lb (80.3 kg)   LMP 12/27/2023 (Exact Date)   BMI 29.45 kg/m²  Estimated body mass index is 29.45 kg/m² as calculated from the following:    Height as of this encounter: 5' 5\" (1.651 m).    Weight as of this encounter: 177 lb (80.3 kg).  Physical Exam  Constitutional:       Appearance: Normal appearance.   HENT:      Head: Normocephalic and atraumatic.      Right Ear: Ear canal normal.      Left Ear: Ear canal normal.   Eyes:      General: No scleral icterus.     Pupils: Pupils are equal, round, and reactive to light.   Neck:      Vascular: No carotid bruit.      Comments: Thyroid upper limits of normal  Cardiovascular:      Rate and Rhythm: Normal rate  and regular rhythm.   Pulmonary:      Effort: Pulmonary effort is normal.      Breath sounds: Normal breath sounds.   Abdominal:      Palpations: Abdomen is soft.      Tenderness: There is no abdominal tenderness.   Musculoskeletal:      Cervical back: Neck supple.      Right lower leg: No edema.      Left lower leg: No edema.   Skin:     Findings: No lesion.   Neurological:      Mental Status: She is alert. Mental status is at baseline.   Psychiatric:         Thought Content: Thought content normal.           Assessment & Plan:   1. Wellness examination (Primary) check fasting labs  2. Fibromyalgia on lyrica rx  3. Other hyperlipidemia recheck panel        No follow-ups on file.    Yas Bryan MD, 2/2/2024, 12:47 PM

## 2024-02-07 ENCOUNTER — NURSE ONLY (OUTPATIENT)
Dept: OBGYN CLINIC | Facility: CLINIC | Age: 44
End: 2024-02-07
Payer: COMMERCIAL

## 2024-02-07 VITALS
BODY MASS INDEX: 30 KG/M2 | SYSTOLIC BLOOD PRESSURE: 115 MMHG | DIASTOLIC BLOOD PRESSURE: 80 MMHG | WEIGHT: 177.63 LBS | HEART RATE: 76 BPM

## 2024-02-07 DIAGNOSIS — N80.9 ENDOMETRIOSIS: Primary | ICD-10-CM

## 2024-02-07 PROCEDURE — 96372 THER/PROPH/DIAG INJ SC/IM: CPT | Performed by: OBSTETRICS & GYNECOLOGY

## 2024-02-07 NOTE — PROGRESS NOTES
Pt in today for first Lupron injection, last office visit was 1/10/24 with [facility]. Pt received injection on right upper outer gluteus, tolerated well. Pt was given return sheet for week of 3/7 for next injection, pt verbalized understanding.

## 2024-02-16 ENCOUNTER — PATIENT MESSAGE (OUTPATIENT)
Dept: INTERNAL MEDICINE CLINIC | Facility: CLINIC | Age: 44
End: 2024-02-16

## 2024-02-16 RX ORDER — IBUPROFEN 800 MG/1
800 TABLET ORAL EVERY 6 HOURS PRN
Qty: 60 TABLET | Refills: 1 | OUTPATIENT
Start: 2024-02-16

## 2024-02-20 ENCOUNTER — OFFICE VISIT (OUTPATIENT)
Dept: OBGYN CLINIC | Facility: CLINIC | Age: 44
End: 2024-02-20
Payer: COMMERCIAL

## 2024-02-20 VITALS
DIASTOLIC BLOOD PRESSURE: 80 MMHG | HEART RATE: 78 BPM | SYSTOLIC BLOOD PRESSURE: 115 MMHG | BODY MASS INDEX: 29 KG/M2 | WEIGHT: 176.81 LBS

## 2024-02-20 DIAGNOSIS — L72.3 SEBACEOUS CYST: ICD-10-CM

## 2024-02-20 DIAGNOSIS — N80.9 ENDOMETRIOSIS: ICD-10-CM

## 2024-02-20 DIAGNOSIS — Z01.411 ENCOUNTER FOR GYNECOLOGICAL EXAMINATION WITH ABNORMAL FINDING: Primary | ICD-10-CM

## 2024-02-20 DIAGNOSIS — Z12.31 VISIT FOR SCREENING MAMMOGRAM: ICD-10-CM

## 2024-02-20 PROCEDURE — 3074F SYST BP LT 130 MM HG: CPT | Performed by: OBSTETRICS & GYNECOLOGY

## 2024-02-20 PROCEDURE — 99396 PREV VISIT EST AGE 40-64: CPT | Performed by: OBSTETRICS & GYNECOLOGY

## 2024-02-20 PROCEDURE — 3079F DIAST BP 80-89 MM HG: CPT | Performed by: OBSTETRICS & GYNECOLOGY

## 2024-02-20 RX ORDER — IBUPROFEN 800 MG/1
800 TABLET ORAL 3 TIMES DAILY
Qty: 30 TABLET | Refills: 0 | Status: SHIPPED | OUTPATIENT
Start: 2024-02-20

## 2024-02-20 NOTE — TELEPHONE ENCOUNTER
Maureen High MA 2/19/2024 9:54 AM CST    Please advise...last refill was in 2019.  ----- Message -----  From: Myranda Chatman  Sent: 2/16/2024 5:31 PM CST  To: [facility] Clinical Staff  Subject: RX refill request     I submitted a request today to your office for a refill request for Ibuprofen 800 mg. I received a message through [employer] saying it was denied…possibly because of a duplicate request. I’m not sure if this is a mistake.    Can someone look into it and let me know if this can be refilled for me? If not, can you let me know the reason. This helps me with my muscle tension and pain on my neck and back. I did recently have a wellness check in your office.     Thank you,  Myranda Chatman

## 2024-02-20 NOTE — PROGRESS NOTES
Myranda Chatman is a 43 year old female  Patient's last menstrual period was 02/15/2024 (approximate).    Chief Complaint   Patient presents with    Gyn Exam     Annual // Mammo order    .     Her cycles are regular.  She has had a small lump in her right groin for a few years but over the past 6 months it has increased in size.  She would like it removed.  Will return to clinic for this.  She has h/o endometriosis and she has had one lupron injection so far- her next is due 3/7/24.  I rec tx for 1 year.      OBSTETRICS HISTORY:  OB History    Para Term  AB Living   2 2 0 0 0 2   SAB IAB Ectopic Multiple Live Births   0 0 0 0 2       GYNE HISTORY:   Pap Date: 23  Pap Result Notes: Neg Pap/HPV // Mammo 23 Shayan Neg  Follow Up Recommendation: Annual 2/3/23 [facility]      MEDICAL HISTORY:  Past Medical History:   Diagnosis Date    Anxiety     Anxiety state     Fibromyalgia     Gastritis 2017    Internal hemorrhoids 2017     Past Surgical History:   Procedure Laterality Date    COLONOSCOPY N/A 2017    Procedure: COLONOSCOPY;  Surgeon: Maureen Begum MD;  [facility]    COLONOSCOPY      EXCISION TURBINATE,SUBMUCOUS  2018    NASAL SCOPY,REMV PART ETHMOID  2018    NASAL SCOPY,RMV TISS MAXILL SINUS  2018    NEEDLE BIOPSY RIGHT Right     unsure of where on right breast age 19    REPAIR OF NASAL SEPTUM  2018    UPPER GI ENDOSCOPY,EXAM           SOCIAL HISTORY:  Social History     Socioeconomic History    Marital status:    Tobacco Use    Smoking status: Never    Smokeless tobacco: Never   Vaping Use    Vaping Use: Never used   Substance and Sexual Activity    Alcohol use: Yes     Alcohol/week: 3.0 standard drinks of alcohol     Types: 3 Cans of beer per week     Comment: social    Drug use: No    Sexual activity: Not Currently     Comment: none        FAMILY HISTORY:  Family History   Problem Relation Age of Onset    Diabetes Father      Hypertension Father     Diabetes Mother     Hypertension Mother     Breast Cancer Neg     Ovarian Cancer Neg        MEDICATIONS:    Current Outpatient Medications:     norethindrone (AYGESTIN) 5 MG Oral Tab, Take 0.5 tablets (2.5 mg total) by mouth daily., Disp: 45 tablet, Rfl: 0    pregabalin 25 MG Oral Cap, TAKE 1 CAPSULE BY MOUTH EVERY MORNING AND 2 CAPS OF BEDTIME, Disp: 90 capsule, Rfl: 3    acetaminophen-codeine (TYLENOL WITH CODEINE #3) 300-30 MG Oral Tab, Take 1 tablet by mouth daily as needed., Disp: 30 tablet, Rfl: 0    montelukast 10 MG Oral Tab, Take 1 tablet (10 mg total) by mouth nightly., Disp: 30 tablet, Rfl: 11    fluticasone propionate 50 MCG/ACT Nasal Suspension, 2 sprays by Each Nare route daily., Disp: 1 each, Rfl: 11    cyclobenzaprine 10 MG Oral Tab, , Disp: , Rfl:     IBUPROFEN 800 MG Oral Tab, TAKE 1 TABLET BY MOUTH EVERY 6 HOURS AS NEEDED FOR PAIN, Disp: 60 tablet, Rfl: 1    ibuprofen 800 MG Oral Tab, Take 1 tablet (800 mg total) by mouth 3 (three) times daily. (Patient not taking: Reported on 2/20/2024), Disp: 30 tablet, Rfl: 0    ALLERGIES:  No Known Allergies    Blood pressure 115/80, pulse 78, weight 176 lb 12.8 oz (80.2 kg), last menstrual period 02/15/2024, not currently breastfeeding.    Review of Systems:  Constitutional:  Denies fatigue, night sweats, hot flashes  Eyes:  denies blurred or double vision  Cardiovascular:  denies chest pain or palpitations  Respiratory:  denies shortness of breath  Gastrointestinal:  denies heartburn, abdominal pain, diarrhea or constipation  Genitourinary:  denies dysuria, incontinence, abnormal vaginal discharge, vaginal itching  Musculoskeletal:  denies back pain.  Skin/Breast:  Denies any breast pain, lumps, or discharge.   Neurological:  denies headaches, extremity weakness or numbness.  Psychiatric: denies depression or anxiety.  Endocrine:   denies excessive thirst or urination.  Heme/Lymph:  denies history of anemia, easy bruising or  bleeding.    Depression Screening (PHQ-2/PHQ-9): Over the LAST 2 WEEKS   Little interest or pleasure in doing things (over the last two weeks)?: Not at all    Feeling down, depressed, or hopeless (over the last two weeks)?: Not at all    PHQ-2 SCORE: 0           PHYSICAL EXAM:   Constitutional: well developed, well nourished  Head/Face: normocephalic  Neck/Thyroid: thyroid symmetric, no thyromegaly, no nodules, no adenopathy  Lymphatic:no abnormal supraclavicular or axillary adenopathy is noted  Breast: normal without palpable masses, tenderness, asymmetry, nipple discharge, nipple retraction or skin changes  Respiratory:  lungs clear to auscultation bilaterally  Cardiovascular: regular rate and rhythm, no significant murmur  Abdomen:  soft, nontender, nondistended, no masses  Skin/Hair: no unusual rashes or bruises  Extremities: no edema, no cyanosis  Psychiatric:  Oriented to time, place, person and situation. Appropriate mood and affect    Pelvic Exam:  External Genitalia: normal appearance, hair distribution, and no lesions, +mobile sebaceous cyst immediately beneath the skin of the right inguinal area, approx 1cm.    Urethral Meatus:  normal in size, location, without lesions and prolapse  Bladder:  No fullness, masses or tenderness  Vagina:  Normal appearance without lesions, no abnormal discharge  Cervix:  Normal without tenderness on motion  Uterus: normal in size, contour, position, mobility, without tenderness  Adnexa: normal without masses or tenderness  Perineum: normal  Anus: no hemorroids     Assessment & Plan:    Encounter Diagnoses   Name Primary?    Encounter for gynecological examination with abnormal finding Yes    Sebaceous cyst     Endometriosis     Visit for screening mammogram      ASCCP guidelines discussed,cotest done [id number] in 3 years,rtc 1 year for annual exam   SBE encouraged  No orders of the defined types were placed in this encounter.      Requested Prescriptions      No  prescriptions requested or ordered in this encounter       [facility] GIAN 2D+3D SCREENING BILAT (CPT=77067/[id number])

## 2024-02-28 ENCOUNTER — TELEPHONE (OUTPATIENT)
Dept: OBGYN CLINIC | Facility: CLINIC | Age: 44
End: 2024-02-28

## 2024-02-28 NOTE — TELEPHONE ENCOUNTER
Pt has lump in the right groin area. Pt accepts appt with [facility] on 3/26. Pre-procedure prep of Ibuprofen or Tylenol recommended.

## 2024-03-02 ENCOUNTER — HOSPITAL ENCOUNTER (OUTPATIENT)
Dept: MAMMOGRAPHY | Facility: HOSPITAL | Age: 44
Discharge: HOME | End: 2024-03-02
Attending: OBSTETRICS & GYNECOLOGY
Payer: COMMERCIAL

## 2024-03-02 DIAGNOSIS — Z12.31 VISIT FOR SCREENING MAMMOGRAM: ICD-10-CM

## 2024-03-02 PROCEDURE — 77063 BREAST TOMOSYNTHESIS BI: CPT | Performed by: OBSTETRICS & GYNECOLOGY

## 2024-03-02 PROCEDURE — 77067 SCR MAMMO BI INCL CAD: CPT | Performed by: OBSTETRICS & GYNECOLOGY

## 2024-03-07 ENCOUNTER — NURSE ONLY (OUTPATIENT)
Dept: OBGYN CLINIC | Facility: CLINIC | Age: 44
End: 2024-03-07
Payer: COMMERCIAL

## 2024-03-07 DIAGNOSIS — N80.9 ENDOMETRIOSIS: Primary | ICD-10-CM

## 2024-03-07 PROCEDURE — 96372 THER/PROPH/DIAG INJ SC/IM: CPT | Performed by: OBSTETRICS & GYNECOLOGY

## 2024-03-07 NOTE — PROGRESS NOTES
Pt in office for dose 2 of Lupron 3.75 mg.   Injection given to left glute per pt preference.   Provided date to RTC in 1 mo. Patient verbalized understanding.

## 2024-03-26 ENCOUNTER — OFFICE VISIT (OUTPATIENT)
Dept: OBGYN CLINIC | Facility: CLINIC | Age: 44
End: 2024-03-26
Payer: COMMERCIAL

## 2024-03-26 VITALS
BODY MASS INDEX: 29 KG/M2 | HEART RATE: 78 BPM | DIASTOLIC BLOOD PRESSURE: 82 MMHG | SYSTOLIC BLOOD PRESSURE: 114 MMHG | WEIGHT: 173 LBS

## 2024-03-26 DIAGNOSIS — L72.3 SEBACEOUS CYST: Primary | ICD-10-CM

## 2024-03-26 PROCEDURE — 3074F SYST BP LT 130 MM HG: CPT | Performed by: OBSTETRICS & GYNECOLOGY

## 2024-03-26 PROCEDURE — 56605 BIOPSY OF VULVA/PERINEUM: CPT | Performed by: OBSTETRICS & GYNECOLOGY

## 2024-03-26 PROCEDURE — 3079F DIAST BP 80-89 MM HG: CPT | Performed by: OBSTETRICS & GYNECOLOGY

## 2024-03-27 NOTE — PROCEDURES
Vulvar Biopsy       Birth control method(s) used:  none    Consent signed.  Procedure discussed with patient in detail including indication, risk, benefits, alternatives and complications.    Lesion Description: 0.5mm sebaceous cyst    Procedure:  Betadine wash of procedural site.  1% lidocaine without epinephrine used topically for local anesthesia.  Removal of sebaceous cyst done using # 11 scalpel to open the skin and pickup and scissors to lift and resect the cyst capsule and contents.  Silver nitrate used to achieve hemostasis.  Good hemostasis noted. Neosporin applied  Patient tolerated procedure well.    Plan:  Site care discussed with patient.  Neosporin twice a day.  Sitz baths twice a day.    Will notify of path results

## 2024-04-03 ENCOUNTER — PATIENT MESSAGE (OUTPATIENT)
Dept: OBGYN CLINIC | Facility: CLINIC | Age: 44
End: 2024-04-03

## 2024-04-04 ENCOUNTER — OFFICE VISIT (OUTPATIENT)
Dept: OBGYN CLINIC | Facility: CLINIC | Age: 44
End: 2024-04-04
Payer: COMMERCIAL

## 2024-04-04 VITALS
SYSTOLIC BLOOD PRESSURE: 106 MMHG | HEART RATE: 102 BPM | DIASTOLIC BLOOD PRESSURE: 72 MMHG | WEIGHT: 173 LBS | BODY MASS INDEX: 29 KG/M2

## 2024-04-04 DIAGNOSIS — T14.8XXA DISCHARGE FROM WOUND: ICD-10-CM

## 2024-04-04 DIAGNOSIS — Z98.890 S/P SKIN BIOPSY: Primary | ICD-10-CM

## 2024-04-04 PROCEDURE — 3078F DIAST BP <80 MM HG: CPT | Performed by: OBSTETRICS & GYNECOLOGY

## 2024-04-04 PROCEDURE — 3074F SYST BP LT 130 MM HG: CPT | Performed by: OBSTETRICS & GYNECOLOGY

## 2024-04-04 PROCEDURE — 99213 OFFICE O/P EST LOW 20 MIN: CPT | Performed by: OBSTETRICS & GYNECOLOGY

## 2024-04-04 RX ORDER — DOXYCYCLINE HYCLATE 100 MG/1
100 CAPSULE ORAL 2 TIMES DAILY
Qty: 14 CAPSULE | Refills: 0 | Status: SHIPPED | OUTPATIENT
Start: 2024-04-04 | End: 2024-04-11

## 2024-04-04 NOTE — PROGRESS NOTES
Myranda COOPER Compa    1980       Chief Complaint   Patient presents with    Follow - Up     F/U TO CYST C/O YELLOWISH DISCHARGE    Pt had removal of a sebaceous cyst of vulva last week and noticed a yellow fim covering the incision- wants to make sure there is no skin infection.  Denies pain in the area.    Past Medical History:   Diagnosis Date    Anxiety     Anxiety state     Fibromyalgia     Gastritis 2017    Internal hemorrhoids 2017       Past Surgical History:   Procedure Laterality Date    COLONOSCOPY N/A 2017    Procedure: COLONOSCOPY;  Surgeon: Maureen Begum MD;  Location: [facility] ENDO    COLONOSCOPY      EXCISION TURBINATE,SUBMUCOUS  2018    NASAL SCOPY,REMV PART ETHMOID  2018    NASAL SCOPY,RMV TISS MAXILL SINUS  2018    NEEDLE BIOPSY RIGHT Right 1999    unsure of where on right breast age 19    REPAIR OF NASAL SEPTUM  2018    UPPER GI ENDOSCOPY,EXAM          Pap Date: 23  Pap Result Notes: Neg Pap/HPV // Mammo 3/2/24 Shayan Neg  Follow Up Recommendation: Annual 24 [facility]      Current Outpatient Medications on File Prior to Visit   Medication Sig Dispense Refill    norethindrone (AYGESTIN) 5 MG Oral Tab Take 0.5 tablets (2.5 mg total) by mouth daily. 45 tablet 0    acetaminophen-codeine (TYLENOL WITH CODEINE #3) 300-30 MG Oral Tab Take 1 tablet by mouth daily as needed. 30 tablet 0    montelukast 10 MG Oral Tab Take 1 tablet (10 mg total) by mouth nightly. 30 tablet 11    fluticasone propionate 50 MCG/ACT Nasal Suspension 2 sprays by Each Nare route daily. 1 each 11    cyclobenzaprine 10 MG Oral Tab       IBUPROFEN 800 MG Oral Tab TAKE 1 TABLET BY MOUTH EVERY 6 HOURS AS NEEDED FOR PAIN 60 tablet 1    ibuprofen 800 MG Oral Tab Take 1 tablet (800 mg total) by mouth 3 (three) times daily. (Patient not taking: Reported on 2024) 30 tablet 0    pregabalin 25 MG Oral Cap TAKE 1 CAPSULE BY MOUTH EVERY MORNING AND 2 CAPS OF BEDTIME (Patient not  taking: Reported on 4/4/2024) 90 capsule 3     Current Facility-Administered Medications on File Prior to Visit   Medication Dose Route Frequency Provider Last Rate Last Admin    leuprolide (Lupron Depot 1 Month) 3.75 mg IM injection  3.75 mg Intramuscular Q28 Days Jenny Watson MD   3.75 mg at 03/07/24 1150       Birth control method: none      PHYSICAL EXAM:       Constitutional: well developed, well nourished  Head/Face: normocephalic  Psychiatric:  Oriented to time, place, person and situation. Appropriate mood and affect    Pelvic Exam:  External Genitalia: vulvar biopsy site is now widened c/w a shallow ulcer with a thin film of yellow exudate- borders are clean and no induration or erythema surrounding it  Urethral Meatus:  normal in size, location, without lesions and prolapse  Perineum: normal  Anus: no hemorhroids      Myranda was seen today for follow - up.    Diagnoses and all orders for this visit:    S/P skin biopsy    Discharge from wound    Other orders  -     doxycycline 100 MG Oral Cap; Take 1 capsule (100 mg total) by mouth 2 (two) times daily for 7 days.      Pt instructed to add a small drop of betadine to sitz baths for the next 2-3 days and then continue for the rest of the week with plain sitz baths.  Take antibiotic and stop the neosporin.

## 2024-04-08 ENCOUNTER — NURSE ONLY (OUTPATIENT)
Dept: OBGYN CLINIC | Facility: CLINIC | Age: 44
End: 2024-04-08
Payer: COMMERCIAL

## 2024-04-08 DIAGNOSIS — N80.9 ENDOMETRIOSIS: Primary | ICD-10-CM

## 2024-04-08 NOTE — PROGRESS NOTES
Pt in office for dose 3 of Lupron 3.75 mg.   Injection given to right glute per pt preference.   Provided date to RTC in 3 mo for 3 month dosing. Patient verbalized understanding.         See 1/10/24 TE for orders.

## 2024-04-22 ENCOUNTER — TELEPHONE (OUTPATIENT)
Dept: OBGYN CLINIC | Facility: CLINIC | Age: 44
End: 2024-04-22

## 2024-04-22 NOTE — TELEPHONE ENCOUNTER
Pt was on monthly Lupron x3 doses, needs to start every 3 month dosing x3. Pt needs to come back one month after last monthly shot to start the 3 month shots. So it would be 5/8/2024 w/repeat being 3 months after that in August.     [facility]

## 2024-04-22 NOTE — TELEPHONE ENCOUNTER
Pt received 3rd dose of Lupron 3.75mg on 4/8/2024. To start 3 month dosing x 3 per [facility]'s original order on 1/10/2024. Due for Lupron 11.25mg on 5/8/2024.     Attempted to call patient to reschedule nurse visit from 7/8/24 to 5/8/24. No answer, LMTCB.

## 2024-04-30 NOTE — TELEPHONE ENCOUNTER
Pt rescheduled.    Future Appointments   Date Time Provider Department Center   5/8/2024  1:00 PM [facility] INJECTION ROOM [facility]

## 2024-05-09 ENCOUNTER — HOSPITAL ENCOUNTER (OUTPATIENT)
Age: 44
Discharge: HOME | End: 2024-05-09
Payer: COMMERCIAL

## 2024-05-09 VITALS
HEIGHT: 66 IN | OXYGEN SATURATION: 97 % | BODY MASS INDEX: 27.32 KG/M2 | RESPIRATION RATE: 18 BRPM | SYSTOLIC BLOOD PRESSURE: 133 MMHG | HEART RATE: 91 BPM | TEMPERATURE: 98 F | WEIGHT: 170 LBS | DIASTOLIC BLOOD PRESSURE: 88 MMHG

## 2024-05-09 DIAGNOSIS — Z71.1 WORRIED WELL: Primary | ICD-10-CM

## 2024-05-09 DIAGNOSIS — R68.2 DRY MOUTH: ICD-10-CM

## 2024-05-09 DIAGNOSIS — R51.9 INTERMITTENT HEADACHE: ICD-10-CM

## 2024-05-09 DIAGNOSIS — E87.6 HYPOKALEMIA: ICD-10-CM

## 2024-05-09 LAB
#MXD IC: 0.1 X10ˆ3/UL (ref 0.1–1)
BUN BLD-MCNC: <5 MG/DL (ref 7–18)
CHLORIDE BLD-SCNC: 99 MMOL/L (ref 98–112)
CO2 BLD-SCNC: 29 MMOL/L (ref 21–32)
CREAT BLD-MCNC: 0.6 MG/DL
EGFRCR SERPLBLD CKD-EPI 2021: 113 ML/MIN/1.73M2 (ref 60–?)
GLUCOSE BLD-MCNC: 123 MG/DL (ref 70–99)
HCT VFR BLD AUTO: 39.2 %
HCT VFR BLD CALC: 42 %
HGB BLD-MCNC: 12.6 G/DL
ISTAT IONIZED CALCIUM FOR CHEM 8: 1.27 MMOL/L (ref 1.12–1.32)
LYMPHOCYTES # BLD AUTO: 2.6 X10ˆ3/UL (ref 1–4)
LYMPHOCYTES NFR BLD AUTO: 39.1 %
MCH RBC QN AUTO: 29.7 PG (ref 26–34)
MCHC RBC AUTO-ENTMCNC: 32.1 G/DL (ref 31–37)
MCV RBC AUTO: 92.5 FL (ref 80–100)
MIXED CELL %: 2.2 %
NEUTROPHILS # BLD AUTO: 3.9 X10ˆ3/UL (ref 1.5–7.7)
NEUTROPHILS NFR BLD AUTO: 58.7 %
PLATELET # BLD AUTO: 320 X10ˆ3/UL (ref 150–450)
POTASSIUM BLD-SCNC: 3.4 MMOL/L (ref 3.6–5.1)
RBC # BLD AUTO: 4.24 X10ˆ6/UL
SODIUM BLD-SCNC: 140 MMOL/L (ref 136–145)
WBC # BLD AUTO: 6.6 X10ˆ3/UL (ref 4–11)

## 2024-05-09 PROCEDURE — 99203 OFFICE O/P NEW LOW 30 MIN: CPT | Performed by: NURSE PRACTITIONER

## 2024-05-09 PROCEDURE — 85025 COMPLETE CBC W/AUTO DIFF WBC: CPT | Performed by: NURSE PRACTITIONER

## 2024-05-09 PROCEDURE — 80047 BASIC METABLC PNL IONIZED CA: CPT | Performed by: NURSE PRACTITIONER

## 2024-05-09 RX ORDER — POTASSIUM CHLORIDE 20 MEQ/1
40 TABLET, EXTENDED RELEASE ORAL ONCE
Status: COMPLETED | OUTPATIENT
Start: 2024-05-09 | End: 2024-05-09

## 2024-05-09 NOTE — DISCHARGE INSTRUCTIONS
Please continue to drink fluids normally   Please follow-up with your primary care provider as discussed  For any new or worsening symptoms

## 2024-05-09 NOTE — ED INITIAL ASSESSMENT (HPI)
Pt presents to the IC with c/o chapped lips and dry mouth. Pt states she feels dehydrated. Pt notes headaches lately.

## 2024-05-09 NOTE — ED PROVIDER NOTES
Chief Complaint   Patient presents with    Headache              HPI:     Myranda Chatman is a 43 year old female with fibromyalgia and anxiety who presents for evaluation and management of a chief complaint of possible dehydration.  She reports intermittent headaches, and dry mouth ongoing for a month.  She denies headache currently.  She denies numbness, tingling, weakness.  She has been eating and drinking normally.  She is urinating normally.  No polydipsia or polyuria.  She has had no chest pain or shortness of breath.  No fever.  No abdominal pain.  Reports she saw her dentist about a month ago, prescribed a mouth rinse for dry mouth, which she has finished and reports has not improved.  Reports she got a liter of fluid over the weekend, administered by one of her nurse friends.  Also reports she went to an infusion clinic today to get another liter of fluid, but the provider was unable insert an IV, so she came here to .  LMP 2/15/2024, perimenopausal, reports no chance of pregnancy, not sexually active.     PFSH  PFSH asessment screens reviewed and agree.  Nursing note reviewed and I agree with documentation.    Family History   Problem Relation Age of Onset    Diabetes Father     Hypertension Father     Diabetes Mother     Hypertension Mother     Breast Cancer Neg     Ovarian Cancer Neg      Family history reviewed with patient/caregiver and is not pertinent to presenting problem.  Social History     Socioeconomic History    Marital status:      Spouse name: Not on file    Number of children: Not on file    Years of education: Not on file    Highest education level: Not on file   Occupational History    Not on file   Tobacco Use    Smoking status: Never    Smokeless tobacco: Never   Vaping Use    Vaping status: Never Used   Substance and Sexual Activity    Alcohol use: Yes     Alcohol/week: 3.0 standard drinks of alcohol     Types: 3 Cans of beer per week     Comment: social    Drug use: No     Sexual activity: Not Currently     Comment: none   Other Topics Concern    Caffeine Concern Not Asked    Exercise Not Asked    Seat Belt Not Asked    Special Diet Not Asked    Stress Concern Not Asked    Weight Concern Not Asked   Social History Narrative    Not on file     Social Determinants of Health     Financial Resource Strain: Not on file   Food Insecurity: Not on file   Transportation Needs: Not on file   Physical Activity: Not on file   Stress: Not on file   Social Connections: Unknown (3/18/2021)    [facility]    Social Connections     Conversations with friends/family/neighbors per week: Not on file   Housing Stability: Low Risk  (7/8/2021)    [facility]    Housing Stability     Mortgage Payment Concerns?: Not on file     Number of Places Lived in the Last Year: Not on file     Unstable Housing?: Not on file        Findings:    /88   Pulse 91   Temp 98.1 °F (36.7 °C) (Temporal)   Resp 18   Ht 167.6 cm (5' 6\")   Wt 77.1 kg   LMP 02/15/2024 (Approximate)   SpO2 97%   BMI 27.44 kg/m²   GENERAL: well developed, well nourished, well hydrated, no distress  HEAD: normocephalic  NECK: supple, no adenopathy  EYES: sclera non icteric bilateral, conjunctiva clear  EARS: TM  bilateral: normal  NOSE: nasal turbinates: pink, normal mucosa  THROAT: clear, without exudates. Moist mucus membranes.  CARDIO: RRR without murmur  LUNGS: clear to auscultation bilaterally; no rales, rhonchi, or wheezes  GI: soft, non-tender, normal bowel sounds  NEURO: no focal deficits  SKIN: good skin turgor, no obvious rashes    MDM/Assessment/Plan:   Orders for this encounter:  Orders Placed This Encounter    POCT CBC     Order Specific Question:   Release to patient     Answer:   Immediate    POCT ISTAT chem8 cartridge    Insert Peripheral IV    iStat (Chem 8)    potassium chloride (Klor-Con M20)  tab 40 mEq       Labs performed this visit:  Recent Results (from the past 10 hour(s))   POCT CBC    Collection Time: 05/09/24  3:51 PM   Result Value Ref Range    WBC IC 6.6 4.0 - 11.0 x10ˆ3/uL    RBC IC 4.24 3.80 - 5.30 X10ˆ6/uL    HGB IC 12.6 12.0 - 16.0 g/dL    HCT IC 39.2 35.0 - 48.0 %    MCV IC 92.5 80.0 - 100.0 fL    MCH IC 29.7 26.0 - 34.0 pg    MCHC IC 32.1 31.0 - 37.0 g/dL    PLT .0 150.0 - 450.0 X10ˆ3/uL    # Neutrophil 3.9 1.5 - 7.7 X10ˆ3/uL    # Lymphocyte 2.6 1.0 - 4.0 X10ˆ3/uL    # Mixed Cells 0.1 0.1 - 1.0 X10ˆ3/uL    Neutrophil % 58.7 %    Lymphocyte % 39.1 %    Mixed Cell % 2.2 %   POCT ISTAT chem8 cartridge    Collection Time: 05/09/24  3:55 PM   Result Value Ref Range    ISTAT Sodium 140 136 - 145 mmol/L    ISTAT BUN <5 (L) 7 - 18 mg/dL    ISTAT Potassium 3.4 (L) 3.6 - 5.1 mmol/L    ISTAT Chloride 99 98 - 112 mmol/L    ISTAT Ionized Calcium 1.27 1.12 - 1.32 mmol/L    ISTAT Hematocrit 42 34 - 50 %    ISTAT Glucose 123 (H) 70 - 99 mg/dL    ISTAT TCO2 29 21 - 32 mmol/L    ISTAT Creatinine 0.60 0.55 - 1.02 mg/dL    eGFR-Cr 113 >=60 mL/min/1.73m2       MDM:   Medical Decision Making  Differentials considered: Idiopathic dry mouth versus electrolyte derangement versus diabetes versus SIADH versus other    Unclear etiology of patient's symptoms today.  No headache currently, neurologically intact.  CBC is normal.  Chem-8 shows mildly decreased K 3.4, which was replaced with 40 meq of potassium chloride today in clinic orally.  BUN is less than 5, glucose is 123, ate [employer] prior to arrival. Patient is healthy appearing.  Normal vitals.  Discussed close follow-up with primary care provider.  ER for any new or worsening symptoms.  Advised she stop arbitrary fluid infusions.  Patient verbalized understanding and agreeable to plan of care.    Case discussed with Dr. Trina Dill    Amount and/or Complexity of Data Reviewed  Labs: ordered. Decision-making details documented in ED Course.      Details: CBC, chem 8          Diagnosis:    ICD-10-CM    1. Worried well  Z71.1       2. Hypokalemia  E87.6       3. Intermittent headache  R51.9       4. Dry mouth  R68.2           All results reviewed and discussed with patient.  See AVS for detailed discharge instructions for your condition today.    Follow Up with:  Yas Bryan MD  [address] [place] [address] [id number] [phone]    Call

## 2024-05-10 NOTE — TELEPHONE ENCOUNTER
Patient currently on Lupron every 3 months x3 doses. Refill request was received for Aygestin 2.5 mg daily.     Last prescription was sent 2/9/2024 for 45 tablets.     To Dr. Henriquez please review and advise. Thank you.

## 2024-05-14 ENCOUNTER — PATIENT MESSAGE (OUTPATIENT)
Dept: OBGYN CLINIC | Facility: CLINIC | Age: 44
End: 2024-05-14

## 2024-05-14 NOTE — TELEPHONE ENCOUNTER
To Dr. Henriquez, patient is going to discontinue her Lupron at this time. Please review Mychart. Thank you.

## 2024-05-14 NOTE — TELEPHONE ENCOUNTER
From: Myranda Chatman  To: Danita BURKETT Page  Sent: 5/14/2024 2:07 PM CDT  Subject: Possible lupron side affect    Hi. I’ve received 3 lupron injections in the last 3 months. I have experienced extreme dry mouth for about 4-6 weeks now. It is so bad that I was certain I was dehydrated. After a visit to urgent care, and labs drawn, I was told I was not dehydrated. Is this a common side affect from this drug? I can’t think of anything else that has been different in the past few months.     If so, I’m afraid I will have to discontinue taking these injections. I was due for one last week on 5/8.       Thank you,  Myranda Chatman

## 2024-05-16 NOTE — TELEPHONE ENCOUNTER
It is most likely the semiglutide and not the Lupron.  If she stops the medication without full treatment her symptoms could return sooner but it is her decision if she wants to stop.  Dry mouth is not a usual side effect for Lupron.

## 2024-05-20 ENCOUNTER — TELEPHONE (OUTPATIENT)
Dept: OBGYN CLINIC | Facility: CLINIC | Age: 44
End: 2024-05-20

## 2024-05-20 NOTE — TELEPHONE ENCOUNTER
Patient needs appointment for Lupron injection ,.  Patient said call her in the [facility] ,

## 2024-05-20 NOTE — TELEPHONE ENCOUNTER
Patient questioning when she is due for next lupron.  Original order states:     Danita Henriquez MD   Physician  Specialty: OBSTETRICS & GYNECOLOGY  Telephone Encounter  Signed     Encounter Date: 1/10/2024     Signed         Please precert for lupron for endometriosis, the 1 month injection for 3 months then the 3 month injection x 3.               Patient has received Lupron 3.75 monthly x 3 doses. Last dose was 4/8/24.  Does patient return 1 month from previous or 3 months from previous to initiate Lupron 11.25 every 3 months?     Patient requesting call at 8am. 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 Otherwise ok for [facility] message.    To Dr. Henriquez

## 2024-05-20 NOTE — TELEPHONE ENCOUNTER
We can now switch her to the q 3 month injections, the 11.25 dose x 3 injections as indicated in the note below.

## 2024-05-23 ENCOUNTER — OFFICE VISIT (OUTPATIENT)
Dept: INTERNAL MEDICINE CLINIC | Facility: CLINIC | Age: 44
End: 2024-05-23

## 2024-05-23 VITALS
HEIGHT: 66 IN | SYSTOLIC BLOOD PRESSURE: 110 MMHG | HEART RATE: 87 BPM | OXYGEN SATURATION: 98 % | WEIGHT: 168 LBS | BODY MASS INDEX: 27 KG/M2 | DIASTOLIC BLOOD PRESSURE: 80 MMHG

## 2024-05-23 DIAGNOSIS — R68.2 DRY MOUTH, UNSPECIFIED: Primary | ICD-10-CM

## 2024-05-23 PROCEDURE — 3008F BODY MASS INDEX DOCD: CPT | Performed by: INTERNAL MEDICINE

## 2024-05-23 PROCEDURE — 3074F SYST BP LT 130 MM HG: CPT | Performed by: INTERNAL MEDICINE

## 2024-05-23 PROCEDURE — 3079F DIAST BP 80-89 MM HG: CPT | Performed by: INTERNAL MEDICINE

## 2024-05-23 PROCEDURE — 99213 OFFICE O/P EST LOW 20 MIN: CPT | Performed by: INTERNAL MEDICINE

## 2024-05-23 NOTE — PROGRESS NOTES
Subjective:   Myranda Chatman is a 43 year old female who presents for Mouth/Lip Problem (Dry mouth)       Patient here for evaluation of 2 months of drymouth and less so dry eyes. Has been symptomatic for more than 8 weeks.  No recent psychoactive medicine, antihistamine or new medicine except Semaglutide and Lupron.  Has no hx of rheumatic illness.  History/Other:    Chief Complaint Reviewed and Verified  Nursing Notes Reviewed and   Verified  Medications Reviewed         Tobacco:  She has never smoked tobacco.    Current Outpatient Medications   Medication Sig Dispense Refill    NORETHINDRONE 5 MG Oral Tab TAKE 1/2 TABLET BY MOUTH DAILY 45 tablet 0    ibuprofen 800 MG Oral Tab Take 1 tablet (800 mg total) by mouth 3 (three) times daily. (Patient not taking: Reported on 2/20/2024) 30 tablet 0    pregabalin 25 MG Oral Cap TAKE 1 CAPSULE BY MOUTH EVERY MORNING AND 2 CAPS OF BEDTIME (Patient not taking: Reported on 4/4/2024) 90 capsule 3    acetaminophen-codeine (TYLENOL WITH CODEINE #3) 300-30 MG Oral Tab Take 1 tablet by mouth daily as needed. (Patient not taking: Reported on 5/9/2024) 30 tablet 0    montelukast 10 MG Oral Tab Take 1 tablet (10 mg total) by mouth nightly. 30 tablet 11    fluticasone propionate 50 MCG/ACT Nasal Suspension 2 sprays by Each Nare route daily. 1 each 11    cyclobenzaprine 10 MG Oral Tab       IBUPROFEN 800 MG Oral Tab TAKE 1 TABLET BY MOUTH EVERY 6 HOURS AS NEEDED FOR PAIN 60 tablet 1         Review of Systems:  Review of Systems   Cardiovascular:  Negative for leg swelling.   Musculoskeletal:  Negative for arthralgias and myalgias.         Objective:   /80   Pulse 87   Ht 5' 6\" (1.676 m)   Wt 168 lb (76.2 kg)   LMP 02/15/2024 (Approximate)   SpO2 98%   BMI 27.12 kg/m²  Estimated body mass index is 27.12 kg/m² as calculated from the following:    Height as of this encounter: 5' 6\" (1.676 m).    Weight as of this encounter: 168 lb (76.2 kg).  Physical Exam  Constitutional:        Appearance: Normal appearance. She is normal weight.   HENT:      Mouth/Throat:      Pharynx: No oropharyngeal exudate or posterior oropharyngeal erythema.      Comments: Moist mucosa on exam no exudate  Eyes:      General: No scleral icterus.     Conjunctiva/sclera: Conjunctivae normal.   Musculoskeletal:         General: No swelling, tenderness or deformity.      Cervical back: Neck supple.   Lymphadenopathy:      Cervical: No cervical adenopathy.   Neurological:      Mental Status: She is alert.           Assessment & Plan:   1. Dry mouth, unspecified (Primary) check fasting glucose and A1c and Sjogrens AB  -     Basic Metabolic Panel (8); Future; Expected date: 05/23/2024  -     Hemoglobin A1C; Future; Expected date: 05/23/2024  -     Sjogren'S Anti-SSA Antibody, IgG; Future; Expected date: 05/23/2024  -     Sjogren's Anti SSB; Future; Expected date: 05/23/2024        No follow-ups on file.    Yas Bryan MD, 5/23/2024, 2:13 PM

## 2024-05-24 ENCOUNTER — LAB ENCOUNTER (OUTPATIENT)
Dept: LAB | Age: 44
End: 2024-05-24
Attending: INTERNAL MEDICINE

## 2024-05-24 DIAGNOSIS — R68.2 DRY MOUTH, UNSPECIFIED: ICD-10-CM

## 2024-05-24 LAB
ANION GAP SERPL CALC-SCNC: 5 MMOL/L (ref 0–18)
BUN BLD-MCNC: 10 MG/DL (ref 9–23)
CALCIUM BLD-MCNC: 9.1 MG/DL (ref 8.5–10.1)
CHLORIDE SERPL-SCNC: 109 MMOL/L (ref 98–112)
CO2 SERPL-SCNC: 27 MMOL/L (ref 21–32)
CREAT BLD-MCNC: 0.5 MG/DL
EGFRCR SERPLBLD CKD-EPI 2021: 119 ML/MIN/1.73M2 (ref 60–?)
EST. AVERAGE GLUCOSE BLD GHB EST-MCNC: 103 MG/DL (ref 68–126)
FASTING STATUS PATIENT QL REPORTED: YES
GLUCOSE BLD-MCNC: 89 MG/DL (ref 70–99)
HBA1C MFR BLD: 5.2 % (ref ?–5.7)
OSMOLALITY SERPL CALC.SUM OF ELEC: 291 MOSM/KG (ref 275–295)
POTASSIUM SERPL-SCNC: 4 MMOL/L (ref 3.5–5.1)
SODIUM SERPL-SCNC: 141 MMOL/L (ref 136–145)

## 2024-05-24 PROCEDURE — 86235 NUCLEAR ANTIGEN ANTIBODY: CPT

## 2024-05-24 PROCEDURE — 80048 BASIC METABOLIC PNL TOTAL CA: CPT

## 2024-05-24 PROCEDURE — 83036 HEMOGLOBIN GLYCOSYLATED A1C: CPT

## 2024-05-28 ENCOUNTER — NURSE ONLY (OUTPATIENT)
Dept: OBGYN CLINIC | Facility: CLINIC | Age: 44
End: 2024-05-28

## 2024-05-28 VITALS
BODY MASS INDEX: 27 KG/M2 | WEIGHT: 169 LBS | HEART RATE: 81 BPM | SYSTOLIC BLOOD PRESSURE: 101 MMHG | DIASTOLIC BLOOD PRESSURE: 71 MMHG

## 2024-05-28 DIAGNOSIS — N80.9 ENDOMETRIOSIS: Primary | ICD-10-CM

## 2024-05-28 LAB
ENA SS-A IGG SER IA-ACNC: <0.4 U/ML
ENA SS-B IGG SER IA-ACNC: <0.4 U/ML

## 2024-05-28 PROCEDURE — 96372 THER/PROPH/DIAG INJ SC/IM: CPT | Performed by: OBSTETRICS & GYNECOLOGY

## 2024-05-28 NOTE — PROGRESS NOTES
Patient in today for lupron injection. Per telephone encounter with Dr. Henriquez on 5/20, patient has switched to every 3 month injection, the 11.25mg dose. Patient received injection on left upper outer gluteus. Patient tolerated injection well. Patient was instructed to return in 3 months for next injection, patient verbalized understanding.

## 2024-07-26 RX ORDER — IBUPROFEN 800 MG/1
800 TABLET ORAL EVERY 6 HOURS PRN
Qty: 60 TABLET | Refills: 1 | Status: SHIPPED | OUTPATIENT
Start: 2024-07-26

## 2024-08-05 ENCOUNTER — OFFICE VISIT (OUTPATIENT)
Dept: INTERNAL MEDICINE CLINIC | Facility: CLINIC | Age: 44
End: 2024-08-05
Payer: COMMERCIAL

## 2024-08-05 VITALS
SYSTOLIC BLOOD PRESSURE: 110 MMHG | HEART RATE: 74 BPM | BODY MASS INDEX: 26.84 KG/M2 | DIASTOLIC BLOOD PRESSURE: 60 MMHG | HEIGHT: 66 IN | WEIGHT: 167 LBS | OXYGEN SATURATION: 97 %

## 2024-08-05 DIAGNOSIS — H00.015 HORDEOLUM EXTERNUM OF LEFT LOWER EYELID: Primary | ICD-10-CM

## 2024-08-05 PROCEDURE — 3008F BODY MASS INDEX DOCD: CPT | Performed by: INTERNAL MEDICINE

## 2024-08-05 PROCEDURE — 3078F DIAST BP <80 MM HG: CPT | Performed by: INTERNAL MEDICINE

## 2024-08-05 PROCEDURE — 3074F SYST BP LT 130 MM HG: CPT | Performed by: INTERNAL MEDICINE

## 2024-08-05 PROCEDURE — 99213 OFFICE O/P EST LOW 20 MIN: CPT | Performed by: INTERNAL MEDICINE

## 2024-08-05 RX ORDER — TOBRAMYCIN 3 MG/ML
2 SOLUTION/ DROPS OPHTHALMIC EVERY 6 HOURS
Qty: 1 EACH | Refills: 1 | Status: SHIPPED | OUTPATIENT
Start: 2024-08-05

## 2024-08-05 RX ORDER — CEPHALEXIN 500 MG/1
500 CAPSULE ORAL 3 TIMES DAILY
Qty: 21 CAPSULE | Refills: 0 | Status: SHIPPED | OUTPATIENT
Start: 2024-08-05

## 2024-08-05 NOTE — PROGRESS NOTES
Subjective:   Myranda Chatman is a 43 year old female who presents for Sty (Left eye)     Patient here for evaluation of a small stye on left lower lid on and off for almost 1 year.  This reflare 3 days ago.  Has never seen ophthalmology.  History/Other:    Chief Complaint Reviewed and Verified  Nursing Notes Reviewed and   Verified  Medications Reviewed  Problem List Reviewed         Tobacco:  She has never smoked tobacco.    Current Outpatient Medications   Medication Sig Dispense Refill    cephalexin (KEFLEX) 500 MG Oral Cap Take 1 capsule (500 mg total) by mouth 3 (three) times daily. 21 capsule 0    ibuprofen 800 MG Oral Tab Take 1 tablet (800 mg total) by mouth every 6 (six) hours as needed for Pain. 60 tablet 1    NORETHINDRONE 5 MG Oral Tab TAKE 1/2 TABLET BY MOUTH DAILY 45 tablet 0    ibuprofen 800 MG Oral Tab Take 1 tablet (800 mg total) by mouth 3 (three) times daily. (Patient not taking: Reported on 2/20/2024) 30 tablet 0    pregabalin 25 MG Oral Cap TAKE 1 CAPSULE BY MOUTH EVERY MORNING AND 2 CAPS OF BEDTIME (Patient not taking: Reported on 4/4/2024) 90 capsule 3    acetaminophen-codeine (TYLENOL WITH CODEINE #3) 300-30 MG Oral Tab Take 1 tablet by mouth daily as needed. (Patient not taking: Reported on 5/9/2024) 30 tablet 0    montelukast 10 MG Oral Tab Take 1 tablet (10 mg total) by mouth nightly. 30 tablet 11    fluticasone propionate 50 MCG/ACT Nasal Suspension 2 sprays by Each Nare route daily. 1 each 11    cyclobenzaprine 10 MG Oral Tab            Review of Systems:  Review of Systems   Eyes:  Positive for pain and redness.         Objective:   /60   Pulse 74   Ht 5' 6\" (1.676 m)   Wt 167 lb (75.8 kg)   LMP 02/15/2024 (Approximate)   SpO2 97%   BMI 26.95 kg/m²  Estimated body mass index is 26.95 kg/m² as calculated from the following:    Height as of this encounter: 5' 6\" (1.676 m).    Weight as of this encounter: 167 lb (75.8 kg).  Physical Exam  Constitutional:       Appearance:  Normal appearance.   Eyes:      Pupils: Pupils are equal, round, and reactive to light.      Comments: Conjunctiva erythemeous with a small stye on lower lid no purulent dc   Neurological:      Mental Status: She is alert.           Assessment & Plan:   1. Hordeolum externum of left lower eyelid (Primary) tobramycin ophthalmic drops 2 drops q 6 Keflex 500mg tid x 7 days and ophthalmology referral  -     Ophthalmology Referral - In Network  Other orders  -     Cephalexin; Take 1 capsule (500 mg total) by mouth 3 (three) times daily.  Dispense: 21 capsule; Refill: 0        No follow-ups on file.    Yas Bryan MD, 8/5/2024, 2:08 PM

## 2024-09-04 ENCOUNTER — TELEPHONE (OUTPATIENT)
Dept: OBGYN CLINIC | Facility: CLINIC | Age: 44
End: 2024-09-04

## 2024-09-04 DIAGNOSIS — Z32.00 PREGNANCY EXAMINATION OR TEST, PREGNANCY UNCONFIRMED: Primary | ICD-10-CM

## 2024-09-04 NOTE — TELEPHONE ENCOUNTER
Appointment booked on 9/5/2024.  Patient's last lupron was given on 5/28.  Patient will be one week late getting lupron.  Message to Dr. Henriquez to see if there are any issues with getting the injection one week late.

## 2024-09-04 NOTE — TELEPHONE ENCOUNTER
Verified message with Dr. Henriquez.  Patient has been on Lupron for 6 months and also has her tubes cauterized.  Per Dr. Henriquez, no need for pregnancy test.  Patient notified.

## 2024-09-04 NOTE — TELEPHONE ENCOUNTER
If injection is being given late then she will need to have a negative serum qual the day before the injection.  Please order

## 2024-09-05 ENCOUNTER — NURSE ONLY (OUTPATIENT)
Dept: OBGYN CLINIC | Facility: CLINIC | Age: 44
End: 2024-09-05
Payer: COMMERCIAL

## 2024-09-05 ENCOUNTER — TELEPHONE (OUTPATIENT)
Dept: OBGYN CLINIC | Facility: CLINIC | Age: 44
End: 2024-09-05

## 2024-09-05 VITALS — DIASTOLIC BLOOD PRESSURE: 61 MMHG | HEART RATE: 92 BPM | SYSTOLIC BLOOD PRESSURE: 98 MMHG

## 2024-09-05 DIAGNOSIS — N80.9 ENDOMETRIOSIS: Primary | ICD-10-CM

## 2024-09-05 PROCEDURE — 96372 THER/PROPH/DIAG INJ SC/IM: CPT | Performed by: OBSTETRICS & GYNECOLOGY

## 2024-09-05 NOTE — PROGRESS NOTES
Patient is here today for LUPRON 11.25 mg injection #2,patient tolerated well without any complaint,patient will return in 3 months for the next injection.

## 2024-09-05 NOTE — TELEPHONE ENCOUNTER
She does not need to see me first- she can just make a consultation appointment with either Dr Gonzalez or Dr Watson.

## 2024-09-05 NOTE — TELEPHONE ENCOUNTER
Patient states that she not longer want to get those Lupron injection.  Wants to discuss getting a complete hysterectomy.

## 2024-09-05 NOTE — TELEPHONE ENCOUNTER
Patient had Lupron injection today and would like to stop Lupron and proceed with hysterectomy. Reports symptoms (lower back pain that is not relieved with ibuprofen, cramping and bloating) return towards the end of each injection. States she has discussed with Dr. Henriquez before about recommendations for Robotic surgery with Dr. Gonzalez or Dr. Watson. Patient would like surgery with Dr. Gonzalez. Message to Dr. Henriquez to ask if she needs appointment with you first or okay to schedule surgery consult with Dr. Gonzalez?

## 2024-10-01 ENCOUNTER — OFFICE VISIT (OUTPATIENT)
Dept: FAMILY MEDICINE CLINIC | Facility: CLINIC | Age: 44
End: 2024-10-01
Payer: COMMERCIAL

## 2024-10-01 VITALS
DIASTOLIC BLOOD PRESSURE: 82 MMHG | SYSTOLIC BLOOD PRESSURE: 124 MMHG | RESPIRATION RATE: 16 BRPM | HEART RATE: 70 BPM | TEMPERATURE: 98 F | BODY MASS INDEX: 28 KG/M2 | WEIGHT: 171 LBS | OXYGEN SATURATION: 99 %

## 2024-10-01 DIAGNOSIS — Z87.42 HISTORY OF ENDOMETRIOSIS: ICD-10-CM

## 2024-10-01 DIAGNOSIS — R39.9 URINARY TRACT INFECTION SYMPTOMS: Primary | ICD-10-CM

## 2024-10-01 LAB
APPEARANCE: CLEAR
BILIRUBIN: NEGATIVE
GLUCOSE (URINE DIPSTICK): NEGATIVE MG/DL
KETONES (URINE DIPSTICK): NEGATIVE MG/DL
LEUKOCYTES: NEGATIVE
MULTISTIX LOT#: NORMAL NUMERIC
NITRITE, URINE: NEGATIVE
OCCULT BLOOD: NEGATIVE
PH, URINE: 8 (ref 4.5–8)
PROTEIN (URINE DIPSTICK): NEGATIVE MG/DL
SPECIFIC GRAVITY: 1.02 (ref 1–1.03)
URINE-COLOR: YELLOW
UROBILINOGEN,SEMI-QN: 0.2 MG/DL (ref 0–1.9)

## 2024-10-01 PROCEDURE — 99213 OFFICE O/P EST LOW 20 MIN: CPT

## 2024-10-01 PROCEDURE — 81003 URINALYSIS AUTO W/O SCOPE: CPT

## 2024-10-01 PROCEDURE — 3079F DIAST BP 80-89 MM HG: CPT

## 2024-10-01 PROCEDURE — 3074F SYST BP LT 130 MM HG: CPT

## 2024-10-01 PROCEDURE — 87086 URINE CULTURE/COLONY COUNT: CPT

## 2024-10-01 RX ORDER — PHENAZOPYRIDINE HYDROCHLORIDE 200 MG/1
200 TABLET, FILM COATED ORAL 3 TIMES DAILY PRN
Qty: 15 TABLET | Refills: 0 | Status: SHIPPED | OUTPATIENT
Start: 2024-10-01 | End: 2024-10-06

## 2024-10-01 NOTE — PROGRESS NOTES
Subjective:   Patient ID: Myranda Chatman is a 44 year old female.    Pt presents to [facility] c/o constant pelvic and low back pain and pressure. Endorses urinary urgency and frequency, but denies dysuria at this time. At times she feels her urethra is spasm-ing due to the urinary frequency. The symptoms began two weeks ago. OTC max strength AZO urinary pain relief and lidocaine patches have been providing transient relief. Pt has h/o endometriosis.    History/Other:   Review of Systems   Constitutional:  Negative for fatigue and fever.   Gastrointestinal:  Positive for abdominal distention.   Genitourinary:  Positive for frequency, pelvic pain and urgency. Negative for dysuria, flank pain and hematuria.   All other systems reviewed and are negative.  Current Outpatient Medications   Medication Sig Dispense Refill    phenazopyridine 200 MG Oral Tab Take 1 tablet (200 mg total) by mouth 3 (three) times daily as needed for Pain. 15 tablet 0    ibuprofen 800 MG Oral Tab Take 1 tablet (800 mg total) by mouth every 6 (six) hours as needed for Pain. 60 tablet 1    montelukast 10 MG Oral Tab Take 1 tablet (10 mg total) by mouth nightly. 30 tablet 11    fluticasone propionate 50 MCG/ACT Nasal Suspension 2 sprays by Each Nare route daily. 1 each 11    cyclobenzaprine 10 MG Oral Tab       cephalexin (KEFLEX) 500 MG Oral Cap Take 1 capsule (500 mg total) by mouth 3 (three) times daily. (Patient not taking: Reported on 10/1/2024) 21 capsule 0    tobramycin 0.3 % Ophthalmic Solution Place 2 drops into the left eye every 6 (six) hours. (Patient not taking: Reported on 10/1/2024) 1 each 1    NORETHINDRONE 5 MG Oral Tab TAKE 1/2 TABLET BY MOUTH DAILY (Patient not taking: Reported on 10/1/2024) 45 tablet 0    ibuprofen 800 MG Oral Tab Take 1 tablet (800 mg total) by mouth 3 (three) times daily. (Patient not taking: Reported on 2/20/2024) 30 tablet 0    pregabalin 25 MG Oral Cap TAKE 1 CAPSULE BY MOUTH EVERY MORNING AND 2 CAPS OF BEDTIME  (Patient not taking: Reported on 4/4/2024) 90 capsule 3    acetaminophen-codeine (TYLENOL WITH CODEINE #3) 300-30 MG Oral Tab Take 1 tablet by mouth daily as needed. (Patient not taking: Reported on 5/9/2024) 30 tablet 0     Allergies:No Known Allergies    Objective:   Physical Exam  Vitals reviewed.   Constitutional:       Appearance: Normal appearance. She is normal weight.   HENT:      Head: Normocephalic and atraumatic.   Abdominal:      General: There is no distension.      Palpations: Abdomen is soft.      Tenderness: There is no abdominal tenderness. There is no right CVA tenderness or left CVA tenderness.          Comments: Pt reports increased pressure in lower abdomen. No specific areas of tenderness to palpation.   Skin:     General: Skin is warm and dry.   Neurological:      Mental Status: She is alert.   Psychiatric:         Behavior: Behavior is cooperative.     Assessment & Plan:   1. Urinary tract infection symptoms    2. History of endometriosis      Pt advised to call to move scheduled Urogyn appointment up. Also advised to try to get in with Urology. Pt open to trying pelvic floor therapy.    Orders Placed This Encounter   Procedures    URINALYSIS, AUTO, W/O SCOPE    Urine Culture, Routine       Meds This Visit:  Requested Prescriptions     Signed Prescriptions Disp Refills    phenazopyridine 200 MG Oral Tab 15 tablet 0     Sig: Take 1 tablet (200 mg total) by mouth 3 (three) times daily as needed for Pain.     Instructed pt to increase fluid intake.    Imaging & Referrals:  None

## 2024-10-09 ENCOUNTER — OFFICE VISIT (OUTPATIENT)
Dept: UROLOGY | Facility: HOSPITAL | Age: 44
End: 2024-10-09
Attending: OBSTETRICS & GYNECOLOGY
Payer: COMMERCIAL

## 2024-10-09 ENCOUNTER — TELEPHONE (OUTPATIENT)
Dept: OBGYN CLINIC | Facility: CLINIC | Age: 44
End: 2024-10-09

## 2024-10-09 VITALS — TEMPERATURE: 98 F | HEIGHT: 66 IN | WEIGHT: 171 LBS | BODY MASS INDEX: 27.48 KG/M2

## 2024-10-09 DIAGNOSIS — R10.2 PELVIC PAIN: ICD-10-CM

## 2024-10-09 DIAGNOSIS — R35.0 URINARY FREQUENCY: ICD-10-CM

## 2024-10-09 DIAGNOSIS — N39.3 FEMALE STRESS INCONTINENCE: Primary | ICD-10-CM

## 2024-10-09 DIAGNOSIS — N81.84 PELVIC MUSCLE WASTING: ICD-10-CM

## 2024-10-09 PROCEDURE — 99212 OFFICE O/P EST SF 10 MIN: CPT

## 2024-10-09 NOTE — PROGRESS NOTES
Pt presents w/ initial c/o UI  Urodynamic testing undergone without complication.  Results reviewed with patient  430/40cc & 577/1cc  prison 350cc  No DO  EDWARD @ 200 unreduced      Discussed with patient mgmt options for EDWARD  Biggest bother is EDWARD  Urinary frequency    Discussed management options for EDWARD including expectant management, pelvic floor PT, pessary, and surgery  Discussed risks and benefits associated with each option  Discussed urodynamic testing & results    Discussed AUA IC/PBS guidelines  Wants to do PT post op    Thorough discussion of surgical risks, benefits, and alternatives including, but not limited to bleeding/clots, infection, injury to nearby organs (urethra, bladder, ureters, bowel, blood vessels), mesh erosion, dyspareunia, de patti UI, worsening UI, recurrence, voiding dysfunction, and pain.  Discussed pain mgmt and potential need for narcotics. Discussed addiction potential with narcotics. IL  reviewed.    All questions answered.  Pt will proceed MUS, cysto in combo w/ hyst    Follow up two weeks post op    Dr. Noemy Morales    Pre op packet provided

## 2024-10-14 NOTE — TELEPHONE ENCOUNTER
Per  my previous notes, I discussed that with her h/o endometriosis that her hysterectomy be performed robotically with either Dr Gonzalez or Dr Watson since I do not do robotic surgery.  She will need to see one of them first to see if they agree with plan to proceed with hysterectomy, especially since she did not complete her full course of Lupron.  Please call patient.

## 2024-10-29 ENCOUNTER — OFFICE VISIT (OUTPATIENT)
Dept: OBGYN CLINIC | Facility: CLINIC | Age: 44
End: 2024-10-29
Payer: COMMERCIAL

## 2024-10-29 ENCOUNTER — TELEPHONE (OUTPATIENT)
Dept: OBGYN CLINIC | Facility: CLINIC | Age: 44
End: 2024-10-29

## 2024-10-29 VITALS
WEIGHT: 170.81 LBS | BODY MASS INDEX: 28 KG/M2 | HEART RATE: 69 BPM | SYSTOLIC BLOOD PRESSURE: 121 MMHG | DIASTOLIC BLOOD PRESSURE: 85 MMHG

## 2024-10-29 DIAGNOSIS — N80.9 ENDOMETRIOSIS DETERMINED BY LAPAROSCOPY: ICD-10-CM

## 2024-10-29 DIAGNOSIS — R10.2 CHRONIC PELVIC PAIN IN FEMALE: Primary | ICD-10-CM

## 2024-10-29 DIAGNOSIS — N39.3 SUI (STRESS URINARY INCONTINENCE, FEMALE): ICD-10-CM

## 2024-10-29 DIAGNOSIS — G89.29 CHRONIC PELVIC PAIN IN FEMALE: Primary | ICD-10-CM

## 2024-10-29 PROBLEM — N81.2 INCOMPLETE UTEROVAGINAL PROLAPSE: Status: ACTIVE | Noted: 2024-10-29

## 2024-10-29 PROCEDURE — 3079F DIAST BP 80-89 MM HG: CPT | Performed by: OBSTETRICS & GYNECOLOGY

## 2024-10-29 PROCEDURE — 3074F SYST BP LT 130 MM HG: CPT | Performed by: OBSTETRICS & GYNECOLOGY

## 2024-10-29 PROCEDURE — 99214 OFFICE O/P EST MOD 30 MIN: CPT | Performed by: OBSTETRICS & GYNECOLOGY

## 2024-10-29 NOTE — PROGRESS NOTES
Subjective:   Patient ID: Myranda Chatman is a 44 year old female.    HPI   with menses chronically q 6-8 weeks / 5d / heavy on days 2-3. She had endometriosis documented and treated through laparoscopy 21 while living in [facility]. Bilateral salpingectomy done at same time. Op note is scanned to Media tab. She describes pelvic pain usually coming in waves lasting 2-5 weeks at a time. Dr Henriquez saw her over past several years and ultrasound last year was unremarkable except for two small subserous fibroids. Uterus 8 weeks size. Dr Henriquez prescribed Lupron and she used a total of 9 months. She did not see much help with this last 3 month injection. She is asking for definitive surgery. She has also been under care of Dr Morales for EDWARD. She had urodynamic testing and plan was for Cyst / MUS in conjunction with the hysterectomy. After reviewing her imaging and endometriosis history, Dr Henriquez asked her to see me for Total Laparoscopic Hysterectomy-Bilateral Oophorectomy.     History/Other:   Review of Systems   Constitutional:  Negative for appetite change, fatigue and unexpected weight change.   Eyes:  Negative for visual disturbance.   Respiratory:  Negative for cough and shortness of breath.    Cardiovascular:  Negative for chest pain, palpitations and leg swelling.   Gastrointestinal:  Negative for abdominal distention, abdominal pain, blood in stool, constipation and diarrhea.   Genitourinary:  Positive for dyspareunia and pelvic pain. Negative for dysuria, frequency, menstrual problem and urgency.   Musculoskeletal:  Negative for arthralgias and myalgias.   Skin:  Negative for rash.   Neurological:  Negative for weakness, numbness and headaches.   Psychiatric/Behavioral:  Negative for dysphoric mood. The patient is not nervous/anxious.      Current Outpatient Medications   Medication Sig Dispense Refill    tobramycin 0.3 % Ophthalmic Solution Place 2 drops into the left eye every 6 (six) hours. 1  each 1    ibuprofen 800 MG Oral Tab Take 1 tablet (800 mg total) by mouth every 6 (six) hours as needed for Pain. 60 tablet 1    acetaminophen-codeine (TYLENOL WITH CODEINE #3) 300-30 MG Oral Tab Take 1 tablet by mouth daily as needed. 30 tablet 0    montelukast 10 MG Oral Tab Take 1 tablet (10 mg total) by mouth nightly. 30 tablet 11    fluticasone propionate 50 MCG/ACT Nasal Suspension 2 sprays by Each Nare route daily. 1 each 11    cyclobenzaprine 10 MG Oral Tab       cephalexin (KEFLEX) 500 MG Oral Cap Take 1 capsule (500 mg total) by mouth 3 (three) times daily. (Patient not taking: Reported on 10/1/2024) 21 capsule 0    NORETHINDRONE 5 MG Oral Tab TAKE 1/2 TABLET BY MOUTH DAILY (Patient not taking: Reported on 10/1/2024) 45 tablet 0    ibuprofen 800 MG Oral Tab Take 1 tablet (800 mg total) by mouth 3 (three) times daily. (Patient not taking: Reported on 10/29/2024) 30 tablet 0    pregabalin 25 MG Oral Cap TAKE 1 CAPSULE BY MOUTH EVERY MORNING AND 2 CAPS OF BEDTIME (Patient not taking: Reported on 4/4/2024) 90 capsule 3     Allergies:Allergies[1]    Objective:   Physical Exam  Constitutional:       General: She is not in acute distress.     Appearance: Normal appearance. She is not ill-appearing.   Abdominal:      Comments: Flat, soft and non-tender without mass, organomegaly, ventral hernia or inguinal adenopathy.    Neurological:      Mental Status: She is alert.         Assessment & Plan:   1. Chronic pelvic pain in female    2. Endometriosis determined by laparoscopy    3. EDWARD (stress urinary incontinence, female)        PLAN: I reviewed her previous Op Note. It was consistent with stage 1 endometriosis. Fulguration and salpingectomy were done then in 2021. She is certainly more symptomatic now. The Lupron initially helped for the first 6 months and then not as well in final 3 months. We discussed robotic total hysterectomy with bilateral oophorectomy followed by Dr Morales's portion of procedure. The  procedure with it's indications, risks and complications was discussed. These include but are not limited to: bleeding, infection, injury and VTE. Any of these could require further medical and / or surgical therapy. We discussed prophylactic measures as well. Questions answered and consent obtained. Lastly we discussed the question of immediate vs delayed estrogen replacement post operatively or none at all. We can assess this post operatively based on symptoms. She did tolerate the Lupron albeit with add back Norethindrone. I would allow her to go home same day but Dr Morales may prefer 23 hour observation. We'll inquire with scheduling. We discussed recovery as well. In all we spent 25 minutes in office, greater than 22 minutes in face to face discussion.       Meds This Visit:  Requested Prescriptions      No prescriptions requested or ordered in this encounter       Imaging & Referrals:  None         [1] No Known Allergies

## 2024-12-10 ENCOUNTER — TELEPHONE (OUTPATIENT)
Dept: OBGYN CLINIC | Facility: CLINIC | Age: 44
End: 2024-12-10

## 2024-12-10 DIAGNOSIS — N80.9 ENDOMETRIOSIS: ICD-10-CM

## 2024-12-10 DIAGNOSIS — G89.29 CHRONIC PELVIC PAIN IN FEMALE: Primary | ICD-10-CM

## 2024-12-10 DIAGNOSIS — R10.2 CHRONIC PELVIC PAIN IN FEMALE: Primary | ICD-10-CM

## 2024-12-10 DIAGNOSIS — N39.3 SUI (STRESS URINARY INCONTINENCE, FEMALE): ICD-10-CM

## 2024-12-10 NOTE — TELEPHONE ENCOUNTER
Per 10/29 consultation, patient was to receive call to schedule surgery. Please call tomorrow 12/11.

## 2024-12-11 NOTE — TELEPHONE ENCOUNTER
Spoke to Effie chu both providers had availability including availability with robot was Mon,3/10/25 at 1pm.    Called patient LMTCB    Surgery booked for Mon,03/10 at 1pm to hold slot.

## 2024-12-11 NOTE — TELEPHONE ENCOUNTER
OB GYN SURGICAL SCHEDULING    Assessment: Chronic pelvic pain, endometriosis, EDWARD    Pre-Operative Procedure:  Robotic Total Laparoscopic Hysterectomy-bilateral oophorectomy ( previous salpingectomy) and co-surgery with Dr Morales's procedure    Side: bilateral    In / on:     Date:      Admission:  Day Surgery, unless Dr Morales wants 23 hour observation    Anesthesia: General    Additional Orders:  Routine Orders, including type/screen / CBC    Comments / Orders to Nurse: Patient is an employee and Dr Henriquez's patient

## 2024-12-11 NOTE — TELEPHONE ENCOUNTER
Spoke to patient. [facility] is interested in scheduling in April since mom would have to fly in for .    Aware we will cancel surgery for Mon,3/10 at 1pm.    Called  surgery scheduler both providers and robot are available Mon,4/14 at 1pm    Surgery reschedule to Mon,4/14 at 1pm    Spoke to patient. Aware surgery is scheduled on Mon,4/14/25 at 1pm. Assisted in scheduling 4wk post op.     to assist  in his portion    Per [employer] at [phone] and spoke to Vera RIDLEY who stated No prior authorization is needed for surgery. Call ref#[id number]    Major case and antimicrobial wash instructions sent via [employer]    Delayed staff message sent to MD to please place pre-op orders    Entered in book and calendar

## 2025-01-24 ENCOUNTER — TELEPHONE (OUTPATIENT)
Dept: OBGYN CLINIC | Facility: CLINIC | Age: 45
End: 2025-01-24

## 2025-01-24 NOTE — TELEPHONE ENCOUNTER
Spoke to patient states Her cousins wife is a CRNA with anesthesia her at [facility] would like to make sure she is not working on her case to Dr. Gonzalez for recommendations    Cousins wifes name is Sejal Chopra

## 2025-01-24 NOTE — TELEPHONE ENCOUNTER
Patient would like to speak with the surgical scheduler regarding her upcoming surgery. Please call.

## 2025-02-03 NOTE — TELEPHONE ENCOUNTER
Called OR note has been placed in surgical note for CRNA not to be on case     To Dr. Gonzalez as GABBIE

## 2025-02-13 ENCOUNTER — TELEPHONE (OUTPATIENT)
Dept: UROLOGY | Facility: HOSPITAL | Age: 45
End: 2025-02-13

## 2025-02-13 NOTE — TELEPHONE ENCOUNTER
FMLA paperwork received from [employer].  TC to patient to verify her planned return to work date.  Documents completed, signed and faxed to [phone] as requested.  Verification of successful fax received and all documents scanned into [employer].

## 2025-02-17 ENCOUNTER — TELEPHONE (OUTPATIENT)
Dept: OBGYN CLINIC | Facility: CLINIC | Age: 45
End: 2025-02-17

## 2025-02-17 NOTE — TELEPHONE ENCOUNTER
Patient calling to follow up on FMLA forms that were faxed by [employer] last week. Please call to discuss the process for getting those completed.

## 2025-02-19 ENCOUNTER — TELEPHONE (OUTPATIENT)
Dept: OBGYN CLINIC | Facility: CLINIC | Age: 45
End: 2025-02-19

## 2025-02-19 NOTE — TELEPHONE ENCOUNTER
FMLA forms were turned in. HIPAA not signed and no fees collected. Patient did leave a note to coordinate forms with Dr. Morales's office.  Copies made and sent.

## 2025-02-25 NOTE — TELEPHONE ENCOUNTER
Family Medical Leave Act forms received in forms dept. Logged for processing. [employer] message sent for Release of Information.     Called patient to obtain details.,    Patient wants forms uploaded to [employer]    Type of Leave: continuous Family Medical Leave Act   Reason for Leave: surgery   Start date of leave: 4/14/25  End date of leave:7 weeks  How many flare ups per month/length?:  How many appts per month/length?:   Was Fee and Turnaround info Given?: Y

## 2025-02-26 NOTE — TELEPHONE ENCOUNTER
Dr. Gonzalez    Please sign off on form if you agree to: Family Medical Leave Act for surgery start 4/14/25-7 weeks    -Signature page will be the first page scanned  -From your Inbasket, Highlight the patient and click Chart   -Double click the 2/19/25 Forms Completion telephone encounter  -Scroll down to the Media section   -Click the blue Hyperlink: Family Medical Leave Act Dr. Gonzalez 2/26/25  -Click Acknowledge located in the top right ribbon/menu   -Drag the mouse into the blank space of the document and a + sign will appear. Left click to   electronically sign the document.  -Once signed, simply exit out of the screen and you signature will be saved.     Thank you,  Ricarda

## 2025-03-03 NOTE — TELEPHONE ENCOUNTER
Patient called to ask if she needs to submit forms to [employer]- advised patient she needs to submit forms to [employer] since we dont have an authorization- verbal understanding

## 2025-03-04 ENCOUNTER — OFFICE VISIT (OUTPATIENT)
Dept: OBGYN CLINIC | Facility: CLINIC | Age: 45
End: 2025-03-04
Payer: COMMERCIAL

## 2025-03-04 VITALS
HEART RATE: 78 BPM | BODY MASS INDEX: 28 KG/M2 | WEIGHT: 172.38 LBS | SYSTOLIC BLOOD PRESSURE: 111 MMHG | DIASTOLIC BLOOD PRESSURE: 77 MMHG

## 2025-03-04 DIAGNOSIS — Z01.419 ENCOUNTER FOR GYNECOLOGICAL EXAMINATION WITHOUT ABNORMAL FINDING: Primary | ICD-10-CM

## 2025-03-04 DIAGNOSIS — Z12.31 VISIT FOR SCREENING MAMMOGRAM: ICD-10-CM

## 2025-03-04 PROCEDURE — 3074F SYST BP LT 130 MM HG: CPT | Performed by: OBSTETRICS & GYNECOLOGY

## 2025-03-04 PROCEDURE — 3078F DIAST BP <80 MM HG: CPT | Performed by: OBSTETRICS & GYNECOLOGY

## 2025-03-04 PROCEDURE — 99396 PREV VISIT EST AGE 40-64: CPT | Performed by: OBSTETRICS & GYNECOLOGY

## 2025-03-04 NOTE — PROGRESS NOTES
Myranda Chatman is a 44 year old female  No LMP recorded. (Menstrual status: Irregular Periods).    Chief Complaint   Patient presents with    Gyn Exam     ANNUAL EXAM   .     Her cycles are  regular.  She is scheduled for robotic hysterectomy with Dr. Gonzalez for chronic pelvic pain and endometriosis.  Discussed postop recovery.      OBSTETRICS HISTORY:  OB History    Para Term  AB Living   2 2 0 0 0 2   SAB IAB Ectopic Multiple Live Births   0 0 0 0 2       GYNE HISTORY:   Pap Date: 23  Pap Result Notes: Neg Pap/HPV // Mammo 3/2/24 Shayan Neg  Follow Up Recommendation: Annual 24 CAP      MEDICAL HISTORY:  Past Medical History:    Anxiety    Anxiety state    Fibromyalgia    Gastritis    Internal hemorrhoids     Past Surgical History:   Procedure Laterality Date    Colonoscopy N/A 2017    Procedure: COLONOSCOPY;  Surgeon: Maureen Begum MD;  Location: [facility] ENDO    Colonoscopy      Excision turbinate,submucous  2018    Nasal scopy,remv part ethmoid  2018    Nasal scopy,rmv tiss maxill sinus  2018    Needle biopsy right Right 1999    unsure of where on right breast age 19    Repair of nasal septum  2018    Upper gi endoscopy,exam           SOCIAL HISTORY:  Social History     Socioeconomic History    Marital status:    Tobacco Use    Smoking status: Never    Smokeless tobacco: Never   Vaping Use    Vaping status: Never Used   Substance and Sexual Activity    Alcohol use: Yes     Alcohol/week: 3.0 standard drinks of alcohol     Types: 3 Cans of beer per week     Comment: social    Drug use: No    Sexual activity: Not Currently     Comment: none        FAMILY HISTORY:  Family History   Problem Relation Age of Onset    Diabetes Father     Hypertension Father     Diabetes Mother     Hypertension Mother     Breast Cancer Neg     Ovarian Cancer Neg        MEDICATIONS:    Current Outpatient Medications:     ibuprofen 800 MG Oral Tab, Take 1 tablet (800  mg total) by mouth every 6 (six) hours as needed for Pain., Disp: 60 tablet, Rfl: 1    ibuprofen 800 MG Oral Tab, Take 1 tablet (800 mg total) by mouth 3 (three) times daily., Disp: 30 tablet, Rfl: 0    montelukast 10 MG Oral Tab, Take 1 tablet (10 mg total) by mouth nightly., Disp: 30 tablet, Rfl: 11    fluticasone propionate 50 MCG/ACT Nasal Suspension, 2 sprays by Each Nare route daily., Disp: 1 each, Rfl: 11    cyclobenzaprine 10 MG Oral Tab, , Disp: , Rfl:     cephalexin (KEFLEX) 500 MG Oral Cap, Take 1 capsule (500 mg total) by mouth 3 (three) times daily. (Patient not taking: Reported on 3/4/2025), Disp: 21 capsule, Rfl: 0    tobramycin 0.3 % Ophthalmic Solution, Place 2 drops into the left eye every 6 (six) hours. (Patient not taking: Reported on 3/4/2025), Disp: 1 each, Rfl: 1    NORETHINDRONE 5 MG Oral Tab, TAKE 1/2 TABLET BY MOUTH DAILY (Patient not taking: Reported on 3/4/2025), Disp: 45 tablet, Rfl: 0    pregabalin 25 MG Oral Cap, TAKE 1 CAPSULE BY MOUTH EVERY MORNING AND 2 CAPS OF BEDTIME (Patient not taking: Reported on 3/4/2025), Disp: 90 capsule, Rfl: 3    acetaminophen-codeine (TYLENOL WITH CODEINE #3) 300-30 MG Oral Tab, Take 1 tablet by mouth daily as needed. (Patient not taking: Reported on 3/4/2025), Disp: 30 tablet, Rfl: 0    ALLERGIES:  Allergies[1]    Blood pressure 111/77, pulse 78, weight 172 lb 6.4 oz (78.2 kg), not currently breastfeeding.    Review of Systems:  Constitutional:  Denies fatigue, night sweats, hot flashes  Eyes:  denies blurred or double vision  Cardiovascular:  denies chest pain or palpitations  Respiratory:  denies shortness of breath  Gastrointestinal:  denies heartburn, abdominal pain, diarrhea or constipation  Genitourinary:  denies dysuria, incontinence, abnormal vaginal discharge, vaginal itching  Musculoskeletal:  denies back pain.  Skin/Breast:  Denies any breast pain, lumps, or discharge.   Neurological:  denies headaches, extremity weakness or  numbness.  Psychiatric: denies depression or anxiety.  Endocrine:   denies excessive thirst or urination.  Heme/Lymph:  denies history of anemia, easy bruising or bleeding.    Depression Screening (PHQ-2/PHQ-9): Over the LAST 2 WEEKS   Little interest or pleasure in doing things: Not at all    Feeling down, depressed, or hopeless: Not at all    PHQ-2 SCORE: 0           PHYSICAL EXAM:   Constitutional: well developed, well nourished  Head/Face: normocephalic  Neck/Thyroid: thyroid symmetric, no thyromegaly, no nodules, no adenopathy  Lymphatic:no abnormal supraclavicular or axillary adenopathy is noted  Breast: normal without palpable masses, tenderness, asymmetry, nipple discharge, nipple retraction or skin changes  Respiratory:  lungs clear to auscultation bilaterally  Cardiovascular: regular rate and rhythm, no significant murmur  Abdomen:  soft, nontender, nondistended, no masses  Skin/Hair: no unusual rashes or bruises  Extremities: no edema, no cyanosis  Psychiatric:  Oriented to time, place, person and situation. Appropriate mood and affect    Pelvic Exam:  External Genitalia: normal appearance, hair distribution, and no lesions  Urethral Meatus:  normal in size, location, without lesions and prolapse  Bladder:  No fullness, masses or tenderness  Vagina:  Normal appearance without lesions, no abnormal discharge  Cervix:  Normal without tenderness on motion  Uterus: 8-10 week size, contour, position, mobility, without tenderness  Adnexa: normal without masses or tenderness  Perineum: normal  Anus: no hemorroids     Assessment & Plan:    Encounter Diagnoses   Name Primary?    Encounter for gynecological examination without abnormal finding Yes    Visit for screening mammogram      ASCCP guidelines discussed,cotest done- [id number] in 3 years,rtc 1 year for annual exam   SBE encouraged  No orders of the defined types were placed in this encounter.      Requested Prescriptions      No prescriptions  requested or ordered in this encounter       [facility] GIAN 2D+3D SCREENING BILAT (CPT=77067/15392)                  [1] No Known Allergies

## 2025-03-25 ENCOUNTER — HOSPITAL ENCOUNTER (OUTPATIENT)
Dept: MAMMOGRAPHY | Age: 45
Discharge: HOME | End: 2025-03-25
Attending: OBSTETRICS & GYNECOLOGY
Payer: COMMERCIAL

## 2025-03-25 DIAGNOSIS — Z12.31 VISIT FOR SCREENING MAMMOGRAM: ICD-10-CM

## 2025-03-25 PROCEDURE — 77067 SCR MAMMO BI INCL CAD: CPT | Performed by: OBSTETRICS & GYNECOLOGY

## 2025-03-25 PROCEDURE — 77063 BREAST TOMOSYNTHESIS BI: CPT | Performed by: OBSTETRICS & GYNECOLOGY

## 2025-03-31 ENCOUNTER — PATIENT MESSAGE (OUTPATIENT)
Dept: OBGYN CLINIC | Facility: CLINIC | Age: 45
End: 2025-03-31

## 2025-04-03 ENCOUNTER — OFFICE VISIT (OUTPATIENT)
Facility: LOCATION | Age: 45
End: 2025-04-03
Payer: COMMERCIAL

## 2025-04-03 DIAGNOSIS — J32.0 LEFT MAXILLARY SINUSITIS: ICD-10-CM

## 2025-04-03 DIAGNOSIS — J33.9 NASAL POLYPS: Primary | ICD-10-CM

## 2025-04-03 PROCEDURE — 99204 OFFICE O/P NEW MOD 45 MIN: CPT | Performed by: OTOLARYNGOLOGY

## 2025-04-03 PROCEDURE — 31231 NASAL ENDOSCOPY DX: CPT | Performed by: OTOLARYNGOLOGY

## 2025-04-03 RX ORDER — METHYLPREDNISOLONE 4 MG/1
TABLET ORAL
Qty: 21 TABLET | Refills: 0 | Status: SHIPPED | OUTPATIENT
Start: 2025-04-03 | End: 2025-04-10

## 2025-04-03 NOTE — PROGRESS NOTES
NEW PATIENT PROGRESS NOTE  OTOLOGY/OTOLARYNGOLOGY    REF MD:  No referring provider defined for this encounter.    PCP: Yas Bryan MD    CHIEF COMPLAINT:    Chief Complaint   Patient presents with    Sinus Problem     Patient is here due to not being able to breath well from left side. Reports sinus congestion.  Reports sinus pressure       HISTORY OF PRESENT ILLNESS: Myranda Chatman is a 44 year old female who presents for evaluation of sinus congestion with facial pain. Patient presents with left nasal congestion, unable to breathe through the left side. She reports facial pain above the teeth and extreme dental sensitivity on both sides. Symptoms began suddenly three days ago, initially presenting as headaches before progressing to nasal congestion and dental pain. Past medical history includes sinus surgery in March 2020 with a septoplasty. She is scheduled for total hysterectomy with bladder sling on 4/14/2025.    PAST MEDICAL HISTORY:    Past Medical History:    Anxiety    Anxiety state    Fibromyalgia    Gastritis    Internal hemorrhoids       PAST SURGICAL HISTORY:    Past Surgical History:   Procedure Laterality Date    Colonoscopy N/A 12/19/2017    Procedure: COLONOSCOPY;  Surgeon: Maureen Begum MD;  Location: [facility] ENDO    Colonoscopy      Excision turbinate,submucous  09/12/2018    Nasal scopy,remv part ethmoid  09/12/2018    Nasal scopy,rmv tiss maxill sinus  09/12/2018    Needle biopsy right Right 1999    unsure of where on right breast age 19    Repair of nasal septum  09/12/2018    Upper gi endoscopy,exam         Medications Ordered Prior to Encounter[1]    Allergies: Allergies[2]    SOCIAL HISTORY:    Social History     Tobacco Use    Smoking status: Never    Smokeless tobacco: Never   Substance Use Topics    Alcohol use: Yes     Alcohol/week: 3.0 standard drinks of alcohol     Types: 3 Cans of beer per week     Comment: social       Family History   Problem Relation Age of Onset     Diabetes Father     Hypertension Father     Diabetes Mother     Hypertension Mother     Breast Cancer Neg     Ovarian Cancer Neg        REVIEW OF SYSTEMS:   Positives are in bold  Neuro: Headache, facial weakness, facial numbness, neck pain, vertigo  ENT: Hearing change, tinnitus, otorrhea, otalgia, aural fullness, ear pressure, vertigo, imbalance  Sinus pressure, rhinorrhea, congestion, facial pain, jaw pain, dysphagia, odynophagia, sore throat, voice changes, shortness of breath    EXAMINATION:  I washed my hands with an alcohol-based hand gel prior to examination  Constitutional:   --Vitals: not currently breastfeeding.  General: no apparent distress, well-developed, conversant  Psych: affect pleasant and appropriate for age, alert and oriented  Neuro: Cranial nerves: EOMI, Facial sensation intact to touch, palate elevates midline, tongue protrudes midline, shoulder shrug intact bilateral, facial movement normal bilateral  Respiratory: No stridor, stertor or increased work of breathing  ENT:  --Nose: no external nasal deformity, anterior rhinoscopy: Bilateral nasal septal deviation, no inferior turbinate hypertrophy, mucosa healthy, no rhinorrhea  --OC/OP: No trismus. No masses or lesions noted over the gingiva, buccal mucosa, tongue, FOM, hard/soft palate, tonsillar pillars, posterior pharyngeal wall. Tonsils are symmetrical and soft. FOM/BOT are soft.   --Neck: No palpable cervical lymphadenopathy, no thyromegaly, no masses or lesions over the bilateral submandibular or parotid glands  --Ear: (bilateral ears were examined under binocular microscopy)  Right ear microscopic exam:  Pinna: Normal, no lesions or masses.  Mastoid: Nontender on palpation.   External auditory canal: Clear, no masses or lesions.  Tympanic membrane: Intact, no lesions, normal landmarks.  Middle ear: Aerated.    Left ear microscopic exam:  Pinna: Normal, no lesions or masses.  Mastoid: Nontender on palpation.   External auditory canal:  Clear, no masses or lesions.  Tympanic membrane: Intact, no lesions, normal landmarks.  Middle ear: Aerated.    Nasal endoscopy (date 4/3/2025)  Verbal consent obtained, patient was correctly identified  Topical anesthesia with aerosolized lidocaine and neosynepherine spray in the bilateral nares  Findings:   Right: No mucous throughout. Normal mucosa. Inferior turbinate is non-hypertrophic. Patent maxillary antrostomy adjacent to the middle meatus. Middle meatus is without polyps, purulence, masses. Middle turbinate is well formed and normal appearing. Sphenoethmoid recess is without polyps, purulence, masses.   Left: No mucous throughout. Normal mucosa. Inferior turbinate is non-hypertrophic. Left polyp emanating from the maxillary antrostomy in the middle meatus, extending toward the nasopharynx. Purulence observed around the nasal polyp.  Middle meatus is without polyps, purulence, masses. Middle turbinate is well formed and normal appearing. Sphenoethmoid recess is without polyps, purulence, masses.   Septum: Bilateral nasal septal deviation.  Nasopharynx: No masses, bilateral eustachian tubes are patent. The bilateral fossae of Rosenmueller are clear.     ASSESSMENT/PLAN:  Myranda Chatman is a 44 year old female with     ICD-10-CM   1. Nasal polyps  J33.9   2. Left maxillary sinusitis  J32.0        IMPRESSION:  Acute left maxillary sinusitis  Left antrochoanal nasal polyp, recurrent  S/P septoplasty, turbinate reduction, endoscopic total ethmoidectomy, Endoscopic maxillary antrostomy with tissue removal - 9/12/2018    PLAN:  -Recommend CT sinus to further evaluate nasal polyps  -Start Augmentin 875-125 MG BID for days for 7 days   -Start Medrol dose pack  -Short term use risks include fluid retention, causing swelling in your lower legs, high blood pressure, mood swings, memory, behavior, and other psychological effects, such as confusion or delirium, upset stomach, increased blood sugar, and other less likely  but serious side effects such as avascular necrosis   -Revision sinus surgery is likely to be required; can consider referral to Dr. White, who specializes in sinus surgery  -Of note, patient is undergoing hysterectomy in 10 days   -Follow up after imaging     Situation reviewed with the patient in detail.    Attention: This note has been scribed by Kimberli Luevano under the supervision of Esa Srinivasan MD.     Esa Srinivasan MD  Otology/Otolaryngology  [address] [place] [id number]  Phone [phone]  Fax [phone]      I have personally performed the services described in this documentation. All medical record entries made by the scribe were at my direction and in my presence. I have reviewed the chart and agree that the medical record reflects my personal performance and is accurate and complete.             [1]   Current Outpatient Medications on File Prior to Visit   Medication Sig Dispense Refill    cephalexin (KEFLEX) 500 MG Oral Cap Take 1 capsule (500 mg total) by mouth 3 (three) times daily. 21 capsule 0    tobramycin 0.3 % Ophthalmic Solution Place 2 drops into the left eye every 6 (six) hours. 1 each 1    ibuprofen 800 MG Oral Tab Take 1 tablet (800 mg total) by mouth every 6 (six) hours as needed for Pain. 60 tablet 1    NORETHINDRONE 5 MG Oral Tab TAKE 1/2 TABLET BY MOUTH DAILY 45 tablet 0    ibuprofen 800 MG Oral Tab Take 1 tablet (800 mg total) by mouth 3 (three) times daily. 30 tablet 0    pregabalin 25 MG Oral Cap TAKE 1 CAPSULE BY MOUTH EVERY MORNING AND 2 CAPS OF BEDTIME 90 capsule 3    acetaminophen-codeine (TYLENOL WITH CODEINE #3) 300-30 MG Oral Tab Take 1 tablet by mouth daily as needed. 30 tablet 0    montelukast 10 MG Oral Tab Take 1 tablet (10 mg total) by mouth nightly. 30 tablet 11    fluticasone propionate 50 MCG/ACT Nasal Suspension 2 sprays by Each Nare route daily. 1 each 11    cyclobenzaprine 10 MG Oral Tab         Current Facility-Administered Medications on File Prior to Visit   Medication Dose Route Frequency Provider Last Rate Last Admin    leuprolide (Lupron Depot 1 Month) 3.75 mg IM injection  3.75 mg Intramuscular Q28 Days Jenny Watson MD   3.75 mg at 04/08/24 1152   [2] No Known Allergies

## 2025-04-09 ENCOUNTER — TELEPHONE (OUTPATIENT)
Dept: OBGYN CLINIC | Facility: CLINIC | Age: 45
End: 2025-04-09

## 2025-04-09 NOTE — TELEPHONE ENCOUNTER
Spoke patient aware medical clearance was not requested from Dr. Gonzalez. Patient verbalized understandings. Aware is to review surgical letters , provided her with instructions on how to pull letters up.

## 2025-04-09 NOTE — DISCHARGE INSTRUCTIONS
HOME INSTRUCTIONS  [facility]     Post-Operative Guidelines      AVOID CONSTIPATION - Take Miralax: one capful in water or juice each morning.  You can also take each evening if needed. Use milk of magnesia daily as needed to avoid straining with BMs  No lifting over 10 lbs. (1 gallon of milk is 8 lbs.)  It is okay to walk up and down stairs and to walk outside, but be careful not to become fatigued.  Showers and tub baths are okay, even if you have a catheter or abdominal incision.  You may ride in a car immediately.  You may drive after 1-2 weeks.    Please call us for any of the following:  Temperature above 100.5 for 4 hours or above 101.0 at any time  Chest pain or trouble breathing  Vaginal bleeding heavier than a period  Redness, tenderness or swelling of your legs  Pain or burning when you urinate  Redness, pain or foul discharge from incision    Office telephone, [phone], after hours [phone]      [facility] HOME CARE INSTRUCTIONS: POST-OP ANESTHESIA  The medication that you received for sedation or general anesthesia can last up to 24 hours. Your judgment and reflexes may be altered, even if you feel like your normal self.      We Recommend:   Do not drive any motor vehicle or bicycle   Avoid mowing the lawn, playing sports, or working with power tools/applicances (power saws, electric knives or mixers)   That you have someone stay with you on your first night home   Do not drink alcohol or take sleeping pills or tranquilizers   Do not sign legal documents within 24 hours of your procedure   If you had a nerve block for your surgery, take extra care not to put any pressure on your arm or hand for 24 hours    It is normal:  For you to have a sore throat if you had a breathing tube during surgery (while you were asleep!). The sore throat should get better within 48 hours. You can gargle with warm salt water (1/2 tsp in 4 oz warm water) or use a  throat lozenge for comfort  To feel muscle aches or soreness especially in the abdomen, chest or neck. The achy feeling should go away in the next 24 hours  To feel weak, sleepy or \"wiped out\". Your should start feeling better in the next 24 hours.   To experience mild discomforts such as sore lip or tongue, headache, cramps, gas pains or a bloated feeling in your abdomen.   To experience mild back pain or soreness for a day or two if you had spinal or epidural anesthesia.   If you had laparoscopic surgery, to feel shoulder pain or discomfort on the day of surgery.   For some patients to have nausea after surgery/anesthesia    If you feel nausea or experience vomiting:   Try to move around less.   Eat less than usual or drink only liquids until the next morning   Nausea should resolve in about 24 hours    If you have a problem when you are at home:    Call your surgeons office   Discharge Instructions: After Your Surgery  You’ve just had surgery. During surgery, you were given medicine called anesthesia to keep you relaxed and free of pain. After surgery, you may have some pain or nausea. This is common. Here are some tips for feeling better and getting well after surgery.   Going home  Your healthcare provider will show you how to take care of yourself when you go home. They'll also answer your questions. Have an adult family member or friend drive you home. For the first 24 hours after your surgery:   Don't drive or use heavy equipment.  Don't make important decisions or sign legal papers.  Take medicines as directed.  Don't drink alcohol.  Have someone stay with you, if needed. They can watch for problems and help keep you safe.  Be sure to go to all follow-up visits with your healthcare provider. And rest after your surgery for as long as your provider tells you to.   Coping with pain  If you have pain after surgery, pain medicine will help you feel better. Take it as directed, before pain becomes severe. Also,  ask your healthcare provider or pharmacist about other ways to control pain. This might be with heat, ice, or relaxation. And follow any other instructions your surgeon or nurse gives you.      Stay on schedule with your medicine.     Tips for taking pain medicine  To get the best relief possible, remember these points:   Pain medicines can upset your stomach. Taking them with a little food may help.  Most pain relievers taken by mouth need at least 20 to 30 minutes to start to work.  Don't wait till your pain becomes severe before you take your medicine. Try to time your medicine so that you can take it before starting an activity. This might be before you get dressed, go for a walk, or sit down for dinner.  Constipation is a common side effect of some pain medicines. Call your healthcare provider before taking any medicines such as laxatives or stool softeners to help ease constipation. Also ask if you should skip any foods. Drinking lots of fluids and eating foods such as fruits and vegetables that are high in fiber can also help. Remember, don't take laxatives unless your surgeon has prescribed them.  Drinking alcohol and taking pain medicine can cause dizziness and slow your breathing. It can even be deadly. Don't drink alcohol while taking pain medicine.  Pain medicine can make you react more slowly to things. Don't drive or run machinery while taking pain medicine.  Your healthcare provider may tell you to take acetaminophen to help ease your pain. Ask them how much you're supposed to take each day. Acetaminophen or other pain relievers may interact with your prescription medicines or other over-the-counter (OTC) medicines. Some prescription medicines have acetaminophen and other ingredients in them. Using both prescription and OTC acetaminophen for pain can cause you to accidentally overdose. Read the labels on your OTC medicines with care. This will help you to clearly know the list of ingredients, how much  to take, and any warnings. It may also help you not take too much acetaminophen. If you have questions or don't understand the information, ask your pharmacist or healthcare provider to explain it to you before you take the OTC medicine.   Managing nausea  Some people have an upset stomach (nausea) after surgery. This is often because of anesthesia, pain, or pain medicine, less movement of food in the stomach, or the stress of surgery. These tips will help you handle nausea and eat healthy foods as you get better. If you were on a special food plan before surgery, ask your healthcare provider if you should follow it while you get better. Check with your provider on how your eating should progress. It may depend on the surgery you had. These general tips may help:   Don't push yourself to eat. Your body will tell you when to eat and how much.  Start off with clear liquids and soup. They're easier to digest.  Next try semi-solid foods as you feel ready. These include mashed potatoes, applesauce, and gelatin.  Slowly move to solid foods. Don’t eat fatty, rich, or spicy foods at first.  Don't force yourself to have 3 large meals a day. Instead eat smaller amounts more often.  Take pain medicines with a small amount of solid food, such as crackers or toast. This helps prevent nausea.  When to call your healthcare provider  Call your healthcare provider right away if you have any of these:   You still have too much pain, or the pain gets worse, after taking the medicine. The medicine may not be strong enough. Or there may be a complication from the surgery.  You feel too sleepy, dizzy, or groggy. The medicine may be too strong.  Side effects such as nausea or vomiting. Your healthcare provider may advise taking other medicines to .  Skin changes such as rash, itching, or hives. This may mean you have an allergic reaction. Your provider may advise taking other medicines.  The incision looks different (for instance, part of  it opens up).  Bleeding or fluid leaking from the incision site, and weren't told to expect that.  Fever of 100.4°F (38°C) or higher, or as directed by your provider.  Call 911  Call 911 right away if you have:   Trouble breathing  Facial swelling    If you have obstructive sleep apnea   You were given anesthesia medicine during surgery to keep you comfortable and free of pain. After surgery, you may have more apnea spells because of this medicine and other medicines you were given. The spells may last longer than normal.    At home:  Keep using the continuous positive airway pressure (CPAP) device when you sleep. Unless your healthcare provider tells you not to, use it when you sleep, day or night. CPAP is a common device used to treat obstructive sleep apnea.  Talk with your provider before taking any pain medicine, muscle relaxants, or sedatives. Your provider will tell you about the possible dangers of taking these medicines.  Contact your provider if your sleeping changes a lot even when taking medicines as directed.  [employer] last reviewed this educational content on 10/1/2021  © 7760-7880 [employer]. All rights reserved. This information is not intended as a substitute for professional medical care. Always follow your healthcare professional's instructions.

## 2025-04-10 ENCOUNTER — LAB ENCOUNTER (OUTPATIENT)
Dept: LAB | Facility: HOSPITAL | Age: 45
End: 2025-04-10
Attending: OBSTETRICS & GYNECOLOGY
Payer: COMMERCIAL

## 2025-04-10 DIAGNOSIS — Z01.818 PRE-OP TESTING: ICD-10-CM

## 2025-04-10 LAB
ANTIBODY SCREEN: NEGATIVE
BASOPHILS # BLD AUTO: 0.04 X10(3) UL (ref 0–0.2)
BASOPHILS NFR BLD AUTO: 0.4 %
DEPRECATED RDW RBC AUTO: 41.1 FL (ref 35.1–46.3)
EOSINOPHIL # BLD AUTO: 0.07 X10(3) UL (ref 0–0.7)
EOSINOPHIL NFR BLD AUTO: 0.8 %
ERYTHROCYTE [DISTWIDTH] IN BLOOD BY AUTOMATED COUNT: 12.5 % (ref 11–15)
HCT VFR BLD AUTO: 36 % (ref 35–48)
HGB BLD-MCNC: 12.3 G/DL (ref 12–16)
IMM GRANULOCYTES # BLD AUTO: 0.04 X10(3) UL (ref 0–1)
IMM GRANULOCYTES NFR BLD: 0.4 %
LYMPHOCYTES # BLD AUTO: 3.78 X10(3) UL (ref 1–4)
LYMPHOCYTES NFR BLD AUTO: 41.9 %
MCH RBC QN AUTO: 31.1 PG (ref 26–34)
MCHC RBC AUTO-ENTMCNC: 34.2 G/DL (ref 31–37)
MCV RBC AUTO: 90.9 FL (ref 80–100)
MONOCYTES # BLD AUTO: 0.45 X10(3) UL (ref 0.1–1)
MONOCYTES NFR BLD AUTO: 5 %
NEUTROPHILS # BLD AUTO: 4.65 X10 (3) UL (ref 1.5–7.7)
NEUTROPHILS # BLD AUTO: 4.65 X10(3) UL (ref 1.5–7.7)
NEUTROPHILS NFR BLD AUTO: 51.5 %
PLATELET # BLD AUTO: 319 10(3)UL (ref 150–450)
RBC # BLD AUTO: 3.96 X10(6)UL (ref 3.8–5.3)
RH BLOOD TYPE: POSITIVE
WBC # BLD AUTO: 9 X10(3) UL (ref 4–11)

## 2025-04-10 PROCEDURE — 86900 BLOOD TYPING SEROLOGIC ABO: CPT

## 2025-04-10 PROCEDURE — 86850 RBC ANTIBODY SCREEN: CPT

## 2025-04-10 PROCEDURE — 85025 COMPLETE CBC W/AUTO DIFF WBC: CPT

## 2025-04-10 PROCEDURE — 36415 COLL VENOUS BLD VENIPUNCTURE: CPT

## 2025-04-10 PROCEDURE — 86901 BLOOD TYPING SEROLOGIC RH(D): CPT

## 2025-04-13 NOTE — H&P
[facility]  part of [facility]    History and Physical    Myranda Chatman Patient Status:  Hospital Outpatient Surgery    1980 MRN [id number]   Location [facility] OPERATING ROOM Attending Tone Gonzalez, DO   Hosp Day # 0 PCP Yas Bryan MD     Date:  2025  Date of Admission:  25    History provided by:patient  HPI:   No chief complaint on file.    HPI Katie is a 45 y/o  with menses chronically q 6-8 weeks / 5d / heavy on days 2-3. She had endometriosis documented and treated through laparoscopy 21 while living in [facility]. Bilateral salpingectomy done at same time. Op note is scanned to Media tab. She describes pelvic pain usually coming in waves lasting 2-5 weeks at a time. Dr Henriquez saw her over past several years and ultrasound last year was unremarkable except for two small subserous fibroids. Uterus 8 weeks size. Dr Henriquez prescribed Lupron and she used a total of 9 months. She did not see much help with this last 3 month injection. She is asking for definitive surgery. She has also been under care of Dr Morales for EDWARD. She had urodynamic testing and plan was for Cyst / MUS in conjunction with the hysterectomy. After reviewing her imaging and endometriosis history, Dr Henriquez asked her to see me for Total Laparoscopic Hysterectomy-Bilateral Oophorectomy.     History   Past Medical History[1]  Past Surgical History[2]  Family History[3]  Social History:  Short Social Hx on File[4]  Allergies/Medications:   Allergies: Allergies[5]  Prescriptions Prior to Admission[6]    Review of Systems:   Review of Systems  Constitutional:  Negative for appetite change, fatigue and unexpected weight change.   Eyes:  Negative for visual disturbance.   Respiratory:  Negative for cough and shortness of breath.    Cardiovascular:  Negative for chest pain, palpitations and leg swelling.   Gastrointestinal:  Negative for abdominal distention, abdominal pain,  blood in stool, constipation and diarrhea.   Genitourinary:  Positive for dyspareunia and pelvic pain. Negative for dysuria, frequency, menstrual problem and urgency.   Musculoskeletal:  Negative for arthralgias and myalgias.   Skin:  Negative for rash.   Neurological:  Negative for weakness, numbness and headaches.   Psychiatric/Behavioral:  Negative for dysphoric mood. The patient is not nervous/anxious.    Physical Exam:   Vital Signs:  Height 5' 6\" (1.676 m), weight 170 lb (77.1 kg), last menstrual period 02/24/2024, not currently breastfeeding.  Physical Exam  Constitutional:       General: She is not in acute distress.     Appearance: Normal appearance. She is not ill-appearing.   Abdominal:      Comments: Flat, soft and non-tender without mass, organomegaly, ventral hernia or inguinal adenopathy.    Neurological:      Mental Status: She is alert.   Cervical Papanicolaou 02-03-23  with negative cytology and human papillomavirus testing    Results:     Lab Results   Component Value Date    WBC 9.0 04/10/2025    HGB 12.3 04/10/2025    HCT 36.0 04/10/2025    .0 04/10/2025    CREATSERUM 0.50 (L) 05/24/2024    BUN 10 05/24/2024     05/24/2024    K 4.0 05/24/2024     05/24/2024    CO2 27.0 05/24/2024    GLU 89 05/24/2024    CA 9.1 05/24/2024    ALB 4.4 02/02/2024    ALKPHO 52 02/02/2024    BILT 0.4 02/02/2024    TP 6.7 02/02/2024    AST 19 02/02/2024    ALT 13 02/02/2024    T4F 1.1 02/02/2024    TSH 2.411 02/02/2024    DDIMER 0.73 08/22/2016    ESRML 5 01/30/2023    CRP <0.29 01/30/2023             Assessment/Plan:     Endometriosis determined by laparoscopy        Chronic pelvic pain in female        Incomplete uterovaginal prolapse        EDWARD (stress urinary incontinence, female)      PLAN: I reviewed her previous Op Note. It was consistent with stage 1 endometriosis. Fulguration and salpingectomy were done then in 2021. She is certainly more symptomatic now. The Lupron initially helped for the  first 6 months and then not as well in final 3 months. We discussed robotic total hysterectomy with bilateral oophorectomy followed by Dr Morales's portion of procedure. The procedure with it's indications, risks and complications was discussed. These include but are not limited to: bleeding, infection, injury and VTE. Any of these could require further medical and / or surgical therapy. We discussed prophylactic measures as well. Questions answered and consent obtained. Lastly we discussed the question of immediate vs delayed estrogen replacement post operatively or none at all. We can assess this post operatively based on symptoms. She did tolerate the Lupron albeit with add back Norethindrone. I would allow her to go home same day but Dr Morales may prefer 23 hour observation.         Tone Gonzalez, DO  4/13/2025         [1]   Past Medical History:   Anxiety    Anxiety state    Fibromyalgia    Gastritis    Internal hemorrhoids   [2]   Past Surgical History:  Procedure Laterality Date    Colonoscopy N/A 12/19/2017    Procedure: COLONOSCOPY;  Surgeon: Maureen Begum MD;  Location: [facility] ENDO    Colonoscopy      Excision turbinate,submucous  09/12/2018    Nasal scopy,remv part ethmoid  09/12/2018    Nasal scopy,rmv tiss maxill sinus  09/12/2018    Needle biopsy right Right 1999    unsure of where on right breast age 19    Repair of nasal septum  09/12/2018    Upper gi endoscopy,exam     [3]   Family History  Problem Relation Age of Onset    Diabetes Father     Hypertension Father     Diabetes Mother     Hypertension Mother     Breast Cancer Neg     Ovarian Cancer Neg    [4]   Social History  Socioeconomic History    Marital status:    Tobacco Use    Smoking status: Never    Smokeless tobacco: Never   Vaping Use    Vaping status: Never Used   Substance and Sexual Activity    Alcohol use: Yes     Alcohol/week: 3.0 standard drinks of alcohol     Types: 3 Cans of beer per week     Comment:  social    Drug use: No    Sexual activity: Not Currently     Comment: none     Social Drivers of Health      Received from [facility]    Housing Stability   [5] No Known Allergies  [6]   No medications prior to admission.

## 2025-04-14 ENCOUNTER — HOSPITAL ENCOUNTER (OUTPATIENT)
Facility: HOSPITAL | Age: 45
Setting detail: HOSPITAL OUTPATIENT SURGERY
Discharge: HOME | End: 2025-04-14
Attending: OBSTETRICS & GYNECOLOGY | Admitting: OBSTETRICS & GYNECOLOGY
Payer: COMMERCIAL

## 2025-04-14 ENCOUNTER — ANESTHESIA EVENT (OUTPATIENT)
Dept: SURGERY | Facility: HOSPITAL | Age: 45
End: 2025-04-14
Payer: COMMERCIAL

## 2025-04-14 ENCOUNTER — ANESTHESIA (OUTPATIENT)
Dept: SURGERY | Facility: HOSPITAL | Age: 45
End: 2025-04-14
Payer: COMMERCIAL

## 2025-04-14 VITALS
HEIGHT: 66 IN | BODY MASS INDEX: 27.97 KG/M2 | HEART RATE: 75 BPM | OXYGEN SATURATION: 97 % | WEIGHT: 174 LBS | TEMPERATURE: 98 F | SYSTOLIC BLOOD PRESSURE: 125 MMHG | RESPIRATION RATE: 18 BRPM | DIASTOLIC BLOOD PRESSURE: 62 MMHG

## 2025-04-14 DIAGNOSIS — Z01.818 PRE-OP TESTING: Primary | ICD-10-CM

## 2025-04-14 DIAGNOSIS — Z90.710 STATUS POST LAPAROSCOPIC HYSTERECTOMY: ICD-10-CM

## 2025-04-14 DIAGNOSIS — G89.29 CHRONIC PELVIC PAIN IN FEMALE: ICD-10-CM

## 2025-04-14 DIAGNOSIS — N80.9 ENDOMETRIOSIS: ICD-10-CM

## 2025-04-14 DIAGNOSIS — R10.2 CHRONIC PELVIC PAIN IN FEMALE: ICD-10-CM

## 2025-04-14 DIAGNOSIS — N39.3 SUI (STRESS URINARY INCONTINENCE, FEMALE): ICD-10-CM

## 2025-04-14 LAB
B-HCG UR QL: NEGATIVE
RH BLOOD TYPE: POSITIVE

## 2025-04-14 PROCEDURE — 8E0W4CZ ROBOTIC ASSISTED PROCEDURE OF TRUNK REGION, PERCUTANEOUS ENDOSCOPIC APPROACH: ICD-10-PCS | Performed by: OBSTETRICS & GYNECOLOGY

## 2025-04-14 PROCEDURE — 81025 URINE PREGNANCY TEST: CPT

## 2025-04-14 PROCEDURE — 0UT94ZZ RESECTION OF UTERUS, PERCUTANEOUS ENDOSCOPIC APPROACH: ICD-10-PCS | Performed by: OBSTETRICS & GYNECOLOGY

## 2025-04-14 PROCEDURE — 88307 TISSUE EXAM BY PATHOLOGIST: CPT | Performed by: OBSTETRICS & GYNECOLOGY

## 2025-04-14 PROCEDURE — 0UT24ZZ RESECTION OF BILATERAL OVARIES, PERCUTANEOUS ENDOSCOPIC APPROACH: ICD-10-PCS | Performed by: OBSTETRICS & GYNECOLOGY

## 2025-04-14 PROCEDURE — 0TSD0ZZ REPOSITION URETHRA, OPEN APPROACH: ICD-10-PCS | Performed by: OBSTETRICS & GYNECOLOGY

## 2025-04-14 DEVICE — ABSORBABLE ADHESION BARRIER
Type: IMPLANTABLE DEVICE | Site: URETHRA | Status: FUNCTIONAL
Brand: GYNECARE INTERCEED

## 2025-04-14 DEVICE — TRANSVAGINAL MID-URETHRAL SLING
Type: IMPLANTABLE DEVICE | Site: URETHRA | Status: FUNCTIONAL
Brand: ADVANTAGE FIT™ BLUE SYSTEM

## 2025-04-14 RX ORDER — HYDROMORPHONE HYDROCHLORIDE 1 MG/ML
0.4 INJECTION, SOLUTION INTRAMUSCULAR; INTRAVENOUS; SUBCUTANEOUS EVERY 5 MIN PRN
Status: DISCONTINUED | OUTPATIENT
Start: 2025-04-14 | End: 2025-04-14

## 2025-04-14 RX ORDER — GLYCOPYRROLATE 0.2 MG/ML
INJECTION, SOLUTION INTRAMUSCULAR; INTRAVENOUS AS NEEDED
Status: DISCONTINUED | OUTPATIENT
Start: 2025-04-14 | End: 2025-04-14 | Stop reason: SURG

## 2025-04-14 RX ORDER — MORPHINE SULFATE 4 MG/ML
2 INJECTION, SOLUTION INTRAMUSCULAR; INTRAVENOUS EVERY 10 MIN PRN
Status: DISCONTINUED | OUTPATIENT
Start: 2025-04-14 | End: 2025-04-14

## 2025-04-14 RX ORDER — MIDAZOLAM HYDROCHLORIDE 1 MG/ML
INJECTION INTRAMUSCULAR; INTRAVENOUS AS NEEDED
Status: DISCONTINUED | OUTPATIENT
Start: 2025-04-14 | End: 2025-04-14 | Stop reason: SURG

## 2025-04-14 RX ORDER — SODIUM CHLORIDE, SODIUM LACTATE, POTASSIUM CHLORIDE, CALCIUM CHLORIDE 600; 310; 30; 20 MG/100ML; MG/100ML; MG/100ML; MG/100ML
INJECTION, SOLUTION INTRAVENOUS CONTINUOUS
Status: DISCONTINUED | OUTPATIENT
Start: 2025-04-14 | End: 2025-04-14

## 2025-04-14 RX ORDER — BUPIVACAINE HYDROCHLORIDE AND EPINEPHRINE 2.5; 5 MG/ML; UG/ML
INJECTION, SOLUTION INFILTRATION; PERINEURAL AS NEEDED
Status: DISCONTINUED | OUTPATIENT
Start: 2025-04-14 | End: 2025-04-14 | Stop reason: HOSPADM

## 2025-04-14 RX ORDER — MORPHINE SULFATE 4 MG/ML
4 INJECTION, SOLUTION INTRAMUSCULAR; INTRAVENOUS EVERY 10 MIN PRN
Status: DISCONTINUED | OUTPATIENT
Start: 2025-04-14 | End: 2025-04-14

## 2025-04-14 RX ORDER — ACETAMINOPHEN 500 MG
1000 TABLET ORAL ONCE
Status: COMPLETED | OUTPATIENT
Start: 2025-04-14 | End: 2025-04-14

## 2025-04-14 RX ORDER — LIDOCAINE HYDROCHLORIDE 10 MG/ML
INJECTION, SOLUTION EPIDURAL; INFILTRATION; INTRACAUDAL; PERINEURAL AS NEEDED
Status: DISCONTINUED | OUTPATIENT
Start: 2025-04-14 | End: 2025-04-14 | Stop reason: SURG

## 2025-04-14 RX ORDER — MORPHINE SULFATE 10 MG/ML
6 INJECTION, SOLUTION INTRAMUSCULAR; INTRAVENOUS EVERY 10 MIN PRN
Status: DISCONTINUED | OUTPATIENT
Start: 2025-04-14 | End: 2025-04-14

## 2025-04-14 RX ORDER — ONDANSETRON 4 MG/1
4 TABLET, FILM COATED ORAL EVERY 8 HOURS PRN
Qty: 10 TABLET | Refills: 1 | Status: SHIPPED | OUTPATIENT
Start: 2025-04-14

## 2025-04-14 RX ORDER — GABAPENTIN 100 MG/1
100 CAPSULE ORAL ONCE
Status: DISCONTINUED | OUTPATIENT
Start: 2025-04-14 | End: 2025-04-14

## 2025-04-14 RX ORDER — EPHEDRINE SULFATE 50 MG/ML
INJECTION, SOLUTION INTRAVENOUS AS NEEDED
Status: DISCONTINUED | OUTPATIENT
Start: 2025-04-14 | End: 2025-04-14 | Stop reason: SURG

## 2025-04-14 RX ORDER — HYDROMORPHONE HYDROCHLORIDE 1 MG/ML
0.6 INJECTION, SOLUTION INTRAMUSCULAR; INTRAVENOUS; SUBCUTANEOUS EVERY 5 MIN PRN
Status: DISCONTINUED | OUTPATIENT
Start: 2025-04-14 | End: 2025-04-14

## 2025-04-14 RX ORDER — HYDROMORPHONE HYDROCHLORIDE 1 MG/ML
0.2 INJECTION, SOLUTION INTRAMUSCULAR; INTRAVENOUS; SUBCUTANEOUS EVERY 5 MIN PRN
Status: DISCONTINUED | OUTPATIENT
Start: 2025-04-14 | End: 2025-04-14

## 2025-04-14 RX ORDER — KETOROLAC TROMETHAMINE 30 MG/ML
INJECTION, SOLUTION INTRAMUSCULAR; INTRAVENOUS AS NEEDED
Status: DISCONTINUED | OUTPATIENT
Start: 2025-04-14 | End: 2025-04-14 | Stop reason: SURG

## 2025-04-14 RX ORDER — ONDANSETRON 2 MG/ML
4 INJECTION INTRAMUSCULAR; INTRAVENOUS ONCE
Status: COMPLETED | OUTPATIENT
Start: 2025-04-14 | End: 2025-04-14

## 2025-04-14 RX ORDER — ROCURONIUM BROMIDE 10 MG/ML
INJECTION, SOLUTION INTRAVENOUS AS NEEDED
Status: DISCONTINUED | OUTPATIENT
Start: 2025-04-14 | End: 2025-04-14 | Stop reason: SURG

## 2025-04-14 RX ORDER — NALOXONE HYDROCHLORIDE 0.4 MG/ML
0.08 INJECTION, SOLUTION INTRAMUSCULAR; INTRAVENOUS; SUBCUTANEOUS AS NEEDED
Status: DISCONTINUED | OUTPATIENT
Start: 2025-04-14 | End: 2025-04-14

## 2025-04-14 RX ORDER — ONDANSETRON 2 MG/ML
INJECTION INTRAMUSCULAR; INTRAVENOUS AS NEEDED
Status: DISCONTINUED | OUTPATIENT
Start: 2025-04-14 | End: 2025-04-14 | Stop reason: SURG

## 2025-04-14 RX ORDER — IBUPROFEN 600 MG/1
600 TABLET, FILM COATED ORAL EVERY 6 HOURS PRN
Qty: 30 TABLET | Refills: 0 | Status: SHIPPED | OUTPATIENT
Start: 2025-04-14

## 2025-04-14 RX ORDER — OXYCODONE HYDROCHLORIDE 5 MG/1
5 TABLET ORAL EVERY 6 HOURS PRN
Qty: 10 TABLET | Refills: 0 | Status: SHIPPED | OUTPATIENT
Start: 2025-04-14

## 2025-04-14 RX ORDER — GABAPENTIN 300 MG/1
300 CAPSULE ORAL 3 TIMES DAILY PRN
Qty: 21 CAPSULE | Refills: 0 | Status: SHIPPED | OUTPATIENT
Start: 2025-04-14

## 2025-04-14 RX ADMIN — MIDAZOLAM HYDROCHLORIDE 2 MG: 1 INJECTION INTRAMUSCULAR; INTRAVENOUS at 13:06:00

## 2025-04-14 RX ADMIN — GLYCOPYRROLATE 0.2 MG: 0.2 INJECTION, SOLUTION INTRAMUSCULAR; INTRAVENOUS at 13:31:00

## 2025-04-14 RX ADMIN — ROCURONIUM BROMIDE 10 MG: 10 INJECTION, SOLUTION INTRAVENOUS at 14:23:00

## 2025-04-14 RX ADMIN — SODIUM CHLORIDE, SODIUM LACTATE, POTASSIUM CHLORIDE, CALCIUM CHLORIDE: 600; 310; 30; 20 INJECTION, SOLUTION INTRAVENOUS at 13:06:00

## 2025-04-14 RX ADMIN — LIDOCAINE HYDROCHLORIDE 50 MG: 10 INJECTION, SOLUTION EPIDURAL; INFILTRATION; INTRACAUDAL; PERINEURAL at 13:06:00

## 2025-04-14 RX ADMIN — ROCURONIUM BROMIDE 40 MG: 10 INJECTION, SOLUTION INTRAVENOUS at 13:09:00

## 2025-04-14 RX ADMIN — ONDANSETRON 4 MG: 2 INJECTION INTRAMUSCULAR; INTRAVENOUS at 14:22:00

## 2025-04-14 RX ADMIN — KETOROLAC TROMETHAMINE 30 MG: 30 INJECTION, SOLUTION INTRAMUSCULAR; INTRAVENOUS at 14:39:00

## 2025-04-14 RX ADMIN — EPHEDRINE SULFATE 5 MG: 50 INJECTION, SOLUTION INTRAVENOUS at 13:33:00

## 2025-04-14 NOTE — H&P
43 y/o female with EDWARD for surgical mgmt  Biggest bother is EDWARD  Urinary frequency  Wants to do PT post op     Past Medical History[1]  Past Surgical History[2]  Allergies[3]  Medications Ordered Prior to Encounter[4]  ROS: +EDWARD    /68 (BP Location: Right arm)   Pulse 70   Temp 99.2 °F (37.3 °C) (Oral)   Resp 16   Ht 66\"   Wt 174 lb (78.9 kg)   LMP 02/24/2024 (Exact Date)   SpO2 99%   BMI 28.08 kg/m²   Gen NAD  CV RRR  Pulm nl effort  Abd soft   defer to OR    43 y/o female with EDWARD for surgical mgmt    Thorough discussion of surgical risks, benefits, and alternatives including, but not limited to bleeding/clots, infection, injury to nearby organs (urethra, bladder, ureters, bowel, blood vessels), mesh erosion, dyspareunia, de patti UI, worsening UI, recurrence, voiding dysfunction, and pain.    Discussed pain mgmt and potential need for narcotics. Discussed addiction potential with narcotics. IL  reviewed.    Plan to proceed with MUS, cysto (in combo w/ hysterectom) as consented  All questions answered.   Noemy Morales         [1]   Past Medical History:   Anxiety    Anxiety state    Fibromyalgia    Gastritis    Internal hemorrhoids   [2]   Past Surgical History:  Procedure Laterality Date    Colonoscopy N/A 12/19/2017    Procedure: COLONOSCOPY;  Surgeon: Maureen Begum MD;  Location: [facility] ENDO    Colonoscopy      Excision turbinate,submucous  09/12/2018    Nasal scopy,remv part ethmoid  09/12/2018    Nasal scopy,rmv tiss maxill sinus  09/12/2018    Needle biopsy right Right 1999    unsure of where on right breast age 19    Repair of nasal septum  09/12/2018    Upper gi endoscopy,exam     [3] No Known Allergies  [4]   No current facility-administered medications on file prior to encounter.     Current Outpatient Medications on File Prior to Encounter   Medication Sig Dispense Refill    Zinc 20 MG Oral Cap Take 20 mg by mouth every morning.      ibuprofen 800 MG Oral Tab Take 1  tablet (800 mg total) by mouth every 6 (six) hours as needed for Pain. 60 tablet 1    fluticasone propionate 50 MCG/ACT Nasal Suspension 2 sprays by Each Nare route daily. (Patient taking differently: 2 sprays by Each Nare route as needed.) 1 each 11    acetaminophen-codeine (TYLENOL WITH CODEINE #3) 300-30 MG Oral Tab Take 1 tablet by mouth daily as needed. (Patient taking differently: Take 1 tablet by mouth every 4 (four) hours as needed.) 30 tablet 0    montelukast 10 MG Oral Tab Take 1 tablet (10 mg total) by mouth nightly. (Patient taking differently: Take 1 tablet (10 mg total) by mouth as needed.) 30 tablet 11    cyclobenzaprine 10 MG Oral Tab Take 1 tablet (10 mg total) by mouth at bedtime.

## 2025-04-14 NOTE — ANESTHESIA POSTPROCEDURE EVALUATION
Patient: Myranda Chatman    Procedure Summary       Date: 04/14/25 Room / Location: [facility] MAIN OR 07 / [facility] MAIN OR    Anesthesia Start: 1305 Anesthesia Stop: 1500    Procedures:       XI robot assisted total laparoscopic hysterectomy, bilateral oophorectomy(Carlos), placement of mid urethral sling, cystoscopy, vaginal cuff closure (Carmen) (Bilateral: Abdomen)      Sling insertion (Vagina )      Cystoscopy (Bladder) Diagnosis:       Chronic pelvic pain in female      Endometriosis      EDWARD (stress urinary incontinence, female)      (Chronic pelvic pain in female [R10.2, G89.29]Endometriosis [N80.9]EDWARD (stress urinary incontinence, female) [N39.3])    Surgeons: Tone Gonzalez DO; Noemy Morales DO Anesthesiologist: Zhen Vega MD    Anesthesia Type: general ASA Status: 1            Anesthesia Type: general    Vitals Value Taken Time   /69 04/14/25 14:59   Temp 98 °F (36.7 °C) 04/14/25 14:49   Pulse 81 04/14/25 15:06   Resp 18 04/14/25 15:06   SpO2 98 % 04/14/25 15:06   Vitals shown include unfiled device data.    [facility] AN Post Evaluation:   Patient Evaluated in PACU  Patient Participation: complete - patient participated  Level of Consciousness: awake  Airway Patency:patent  Dental exam unchanged from preop  Yes    Nausea/Vomiting: none  Cardiovascular Status: acceptable  Respiratory Status: acceptable  Postoperative Hydration acceptable      Zhen Vega MD  4/14/2025 3:06 PM

## 2025-04-14 NOTE — OPERATIVE REPORT
[facility]    PATIENT'S NAME: NAREN KOENIG   ATTENDING PHYSICIAN: Tone Bosch DO   OPERATING PHYSICIAN: Tone Bosch DO   PATIENT ACCOUNT#:   [id number]    LOCATION:  [facility] PACU 1 [facility] 10  MEDICAL RECORD #:   [id number]       YOB: 1980  ADMISSION DATE:       04/14/2025      OPERATION DATE:  04/14/2025    OPERATIVE REPORT      PREOPERATIVE DIAGNOSIS:    1.   Chronic pelvic pain in female and endometriosis.    2.   Stress urinary incontinence.  POSTOPERATIVE DIAGNOSIS:    1.   Chronic pelvic pain in female and endometriosis.    2.   Stress urinary incontinence.  PROCEDURE:  Robotic-assisted total laparoscopic hysterectomy with bilateral oophorectomy, followed by Dr. Morales's portion of the procedure, midurethral sling, cystoscopy, vaginal cuff closure.      CO-SURGEONS:  Tone Bosch DO and Noemy Morales DO.     ANESTHESIA:  General/endotracheal.    ESTIMATED BLOOD LOSS:  50 mL with combined procedure.      COMPLICATIONS:  None apparent.    PATHOLOGY SPECIMEN:  Uterus, cervix, and bilateral ovaries.    OPERATIVE TECHNIQUE:  After consent was obtained, patient was taken to the operating suite where general anesthetic was administered.  She was prepped and draped in the usual fashion.  Next, a bimanual exam was performed, and a small cup VCare uterine manipulator was placed without difficulty.  We then switched attention to the laparoscopic portion of the case.  The supraumbilical area was infiltrated with Marcaine 0.25% with epinephrine.  This was followed by an 8 mm transverse incision.  The Veress needle was then placed, placement tested, and insufflation was begun.  After adequate pneumoperitoneum was observed, the Veress needle was removed, and an 8 mm scope trocar was placed.  Next, we placed the scope under direct visualization.  Accessory ports for arms 2 and 4 were placed in the left and right mid quadrants, respectively.  These were done under direct  visualization and used Marcaine with epinephrine.  Lastly, an 8 mm AirSeal assist port was placed in the right mid to upper quadrant under direct visualization using Marcaine with epinephrine as well.  We then placed the patient in full tilt and brought the robot over.  We docked arm 3 to the supraumbilical scope port.  We then performed targeting procedure.  Once that was done, we docked arms 2 and 4.  I moved over to the console while Natan stayed with the patient.  We then identified the course of bilateral ureters well away from the infundibulopelvic ligaments.  I made a sharp window in the peritoneum to isolate the infundibulopelvic ligament on the right side.  It was fulgurated and cut.  We then moved inferiorly and anteriorly across the round ligament, and this opened up the vesicouterine peritoneal reflection.  This was dissected and excised, and we developed the bladder flap on the right side of the midline.  Next, the right uterine vasculature was skeletonized and fulgurated and cut with the vessel sealer.  We then moved posteriorly to the right uterosacral ligament, which was also fulgurated and cut.  We then moved attention to the left side, again noting the course of the ureter well away from the infundibulopelvic ligament.  The IP ligament was isolated and fulgurated with the vessel sealing device.  We then moved anteriorly and inferiorly across the round ligament and fully developed the bladder flap on that side.  We then skeletonized the uterine vasculature on that side, and it was fulgurated and cut, releasing the vascular pedicle across the lateral edge of the colpotomy ring as we did on that right side.  Next, we moved posteriorly, fulgurating and cutting the left uterosacral ligament.  Once this was accomplished, we performed colpotomy.  The uterine specimen with attached ovaries was delivered vaginally without difficulty.  I then asked for a Vicryl suture and closed the right cuff corner,  incorporating the supporting ligament on that side.  Following this, I asked for a 9-inch absorbable V-Loc suture and closed the remainder of the cuff from left to right, incorporating that supporting ligament on the left side.  Once this was accomplished, the area was irrigated with copious amounts of sterile saline.  No bleeding was noted at the operative sites even after slow evacuation of the pneumoperitoneum.  We then removed the instruments and undocked the robot.  We then fully evacuated the pneumoperitoneum.  We asked for 5 manual breaths from Anesthesia to expel insufflation gas.  The ports were then all removed, and we closed all incisions with subcuticular sutures of 4-0 Monocryl and they were covered with Dermabond.  I moved to the vaginal area to assist Dr. Morales.  She performed cystoscopy, and at that point, we had noted a suture barely entering the bladder over an area approximately 3 mm wide.  Decision at that time was to remove the suture, and we performed that vaginally.  Please see Dr. Morales's portion of the dictation, and she will address this removal and cuff closure.     Dictated By Tone Bosch DO  d: 04/14/2025 15:10:15  t: 04/14/2025 15:55:45  Job [id number]  [facility]/    cc: DO Yas Nuñez MD Cynthia A. Page, MD

## 2025-04-14 NOTE — ANESTHESIA PROCEDURE NOTES
Airway  Date/Time: 4/14/2025 1:11 PM  Reason: elective      General Information and Staff   Patient location during procedure: OR  Anesthesiologist: Erik Nugent MD  Performed: anesthesiologist   Performed by: Erik Nugent MD  Authorized by: Erik Nugent MD        Indications and Patient Condition  Indications for airway management: anesthesia  Sedation level: deep      Preoxygenated: yesPatient position: sniffing    Mask difficulty assessment: 1 - vent by mask    Final Airway Details    Final airway type: endotracheal airway    Successful airway: ETT  Cuffed: yes   Successful intubation technique: Video laryngoscopy  Endotracheal tube insertion site: oral  Blade size: #3  ETT size (mm): 7.0    Cormack-Lehane Classification: grade I - full view of glottis  Placement verified by: capnometry   Measured from: lips  ETT to lips (cm): 21  Number of attempts at approach: 1    Additional Comments  Atraumatic x1  Soft bite block  OGT to suction      
Peripheral IV  Date/Time: 4/14/2025 1:15 PM  Inserted by: Erik Nugent MD    Placement  Needle size: 18 G  Laterality: left  Location: hand  Site prep: alcohol  Technique: anatomical landmarks  Attempts: 1      
no concerns

## 2025-04-14 NOTE — ANESTHESIA PREPROCEDURE EVALUATION
Anesthesia PreOp Note    HPI:     Myranda Chatman is a 44 year old female who presents for preoperative consultation requested by: Tone Gonzalez DO    Date of Surgery: 4/14/2025    Procedure(s):  XI robot assisted total laparoscopic hysterectomy, bilateral oophorectomy(Carlos), placement of mid urethral sling, cystoscopy (Carmen)  Sling insertion  Cystoscopy  Indication: Chronic pelvic pain in female [R10.2, G89.29]  Endometriosis [N80.9]  EDWARD (stress urinary incontinence, female) [N39.3]    Relevant Problems   No relevant active problems       NPO:  Last Liquid Consumption Date: 04/14/25  Last Liquid Consumption Time: 1030 (black coffee half a cup)  Last Solid Consumption Date: 04/13/25  Last Solid Consumption Time: 1400  Last Liquid Consumption Date: 04/14/25          History Review:  Patient Active Problem List    Diagnosis Date Noted    Chronic pelvic pain in female 10/29/2024    Incomplete uterovaginal prolapse 10/29/2024    EDWARD (stress urinary incontinence, female) 10/29/2024    Endometriosis determined by laparoscopy 02/20/2024    Hyperlipidemia 12/04/2018    Chronic ethmoidal sinusitis 09/12/2018    Internal hemorrhoids 12/19/2017    Gastritis 12/19/2017    History of Helicobacter pylori infection 11/21/2017    Dizziness 09/21/2017    Chest pain 09/14/2016       Past Medical History[1]    Past Surgical History[2]    Prescriptions Prior to Admission[3]  Current Medications and Prescriptions Ordered in Epic[4]    Allergies[5]    Family History[6]  Social Hx on file[7]    Available pre-op labs reviewed.  Lab Results   Component Value Date    WBC 9.0 04/10/2025    RBC 3.96 04/10/2025    HGB 12.3 04/10/2025    HCT 36.0 04/10/2025    MCV 90.9 04/10/2025    MCH 31.1 04/10/2025    MCHC 34.2 04/10/2025    RDW 12.5 04/10/2025    .0 04/10/2025    URINEPREG Negative 04/14/2025             Vital Signs:  Body mass index is 28.08 kg/m².   height is 1.676 m (5' 6\") and weight is 78.9 kg (174 lb). Her oral  temperature is 99.2 °F (37.3 °C). Her blood pressure is 110/68 and her pulse is 70. Her respiration is 16 and oxygen saturation is 99%.   Vitals:    04/10/25 1353 04/14/25 1218   BP:  110/68   Pulse:  70   Resp:  16   Temp:  99.2 °F (37.3 °C)   TempSrc:  Oral   SpO2:  99%   Weight: 77.1 kg (170 lb) 78.9 kg (174 lb)   Height: 1.676 m (5' 6\")         Anesthesia Evaluation     Patient summary reviewed and Nursing notes reviewed    No history of anesthetic complications   Airway   Mallampati: III  TM distance: <3 FB  Neck ROM: full  Dental - Dentition appears grossly intact     Pulmonary - negative ROS and normal exam   Cardiovascular - negative ROS and normal exam  Exercise tolerance: good    Neuro/Psych    (+)  neuromuscular disease, anxiety/panic attacks,        GI/Hepatic/Renal - negative ROS     Endo/Other - negative ROS   Abdominal                  Anesthesia Plan:   ASA:  1  Plan:   General  Monitors and Lines:   Additonal IV  Airway:  ETT  Post-op Pain Management: IV analgesics  Informed Consent Plan and Risks Discussed With:  Patient      I have informed Myrnada Chatman and/or legal guardian or family member of the nature of the anesthetic plan, benefits, risks including possible dental damage if relevant, major complications, and any alternative forms of anesthetic management.   All of the patient's questions were answered to the best of my ability. The patient desires the anesthetic management as planned.  Erik Nugent MD  4/14/2025 12:44 PM  Present on Admission:   Chronic pelvic pain in female   Endometriosis determined by laparoscopy   Incomplete uterovaginal prolapse   EDWARD (stress urinary incontinence, female)           [1]   Past Medical History:   Anxiety    Anxiety state    Fibromyalgia    Gastritis    Internal hemorrhoids   [2]   Past Surgical History:  Procedure Laterality Date    Colonoscopy N/A 12/19/2017    Procedure: COLONOSCOPY;  Surgeon: Maureen Begum MD;  Location: [facility]  ENDO    Colonoscopy      Excision turbinate,submucous  2018    Nasal scopy,remv part ethmoid  2018    Nasal scopy,rmv tiss maxill sinus  2018    Needle biopsy right Right     unsure of where on right breast age 19    Repair of nasal septum  2018    Upper gi endoscopy,exam     [3]   Medications Prior to Admission   Medication Sig Dispense Refill Last Dose/Taking    Zinc 20 MG Oral Cap Take 20 mg by mouth every morning.   2025    [] amoxicillin clavulanate 875-125 MG Oral Tab Take 1 tablet by mouth every 12 (twelve) hours for 7 days. 14 tablet 0 4/10/2025    ibuprofen 800 MG Oral Tab Take 1 tablet (800 mg total) by mouth every 6 (six) hours as needed for Pain. 60 tablet 1 Past Month    fluticasone propionate 50 MCG/ACT Nasal Suspension 2 sprays by Each Nare route daily. (Patient taking differently: 2 sprays by Each Nare route as needed.) 1 each 11 Past Month    acetaminophen-codeine (TYLENOL WITH CODEINE #3) 300-30 MG Oral Tab Take 1 tablet by mouth daily as needed. (Patient taking differently: Take 1 tablet by mouth every 4 (four) hours as needed.) 30 tablet 0 More than a month    montelukast 10 MG Oral Tab Take 1 tablet (10 mg total) by mouth nightly. (Patient taking differently: Take 1 tablet (10 mg total) by mouth as needed.) 30 tablet 11 More than a month    cyclobenzaprine 10 MG Oral Tab Take 1 tablet (10 mg total) by mouth at bedtime.   More than a month   [4]   Current Facility-Administered Medications Ordered in Epic   Medication Dose Route Frequency Provider Last Rate Last Admin    lactated ringers infusion   Intravenous Continuous Tone Gonzalez DO 20 mL/hr at 25 1226 New Bag at 25 1226    ceFAZolin (Ancef) 2g in 10mL IV syringe premix  2 g Intravenous Once Tone Gonzalez DO         No current [facility]-ordered outpatient medications on file.   [5] No Known Allergies  [6]   Family History  Problem Relation Age of Onset    Diabetes Father     Hypertension  Father     Diabetes Mother     Hypertension Mother     Breast Cancer Neg     Ovarian Cancer Neg    [7]   Social History  Socioeconomic History    Marital status:    Tobacco Use    Smoking status: Never    Smokeless tobacco: Never   Vaping Use    Vaping status: Never Used   Substance and Sexual Activity    Alcohol use: Yes     Alcohol/week: 3.0 standard drinks of alcohol     Types: 3 Cans of beer per week     Comment: social    Drug use: No    Sexual activity: Not Currently     Comment: none

## 2025-04-14 NOTE — BRIEF OP NOTE
Pre-Operative Diagnosis: Chronic pelvic pain in female [R10.2, G89.29]  Endometriosis [N80.9]  EDWARD (stress urinary incontinence, female) [N39.3]     Post-Operative Diagnosis: Chronic pelvic pain in female [R10.2, G89.29]Endometriosis [N80.9]EDWARD (stress urinary incontinence, female) [N39.3]      Procedure Performed:   XI robot assisted total laparoscopic hysterectomy, bilateral oophorectomy(Carlos), placement of mid urethral sling, cystoscopy, vaginal cuff closure (Carmen)    Surgeons and Role:  Panel 1:     * Tone Gonzalez DO - Primary  Panel 2:     * Noemy Morales DO - Primary    Assistant(s):  Surgical Assistant.: Natan Griffith     Surgical Findings: Normal upper abdomen, uterus and bilateral ovaries     Specimen: uterus, cervix and bilateral ovaries     Estimated Blood Loss: 25 ml ( 50 total with Dr Morales's portion )    Dictation Number:  [id number]    Tone Gonzalez DO  4/14/2025  3:01 PM

## 2025-04-14 NOTE — OPERATIVE REPORT
Pre-Operative Diagnosis: Stress Urinary Incontinence    Post-Operative Diagnosis: Same    Procedure Performed: midurethral sling (Advantage Fit); cystoscopy, vaginal cuff closure    Surgeon: Noemy Morales DO    Findings: Bilateral ureteral efflux, no evidence of bladder, ureteral, or urethral injury. Hemostasis upon completion.     Anesthesia: General, ET    EBL: 50 ml (total case)  UOP: 500 mL (total case)    No specimen for urogyn portion    Complications: None    After consent signed and all questions answered the patient was taken to the operating room with IV running,  Anesthesia was initiated.    She was placed in the dorsal lithotomy position and prepped and draped in the usual sterile fashion.    Dr Gonzalez performed his portion of the procedure, please see separate operate report for those details  Attention was directed toward the midurethral sling portion of the procedure    Bladder was emptied. Exit points for the midurethral sling were marked on the mons pubis and local solution, .25% marcaine with epinepherine, was infiltrated.  Two Allis clamps were used to grasp the vaginal epithelium approximately one cm below the urethra.  A scalpel was used to make a midline suburethral incision after infiltration of .25% marcaine with epinepherine, allis clamps were repositioned, and Metzenbaum scissors were used to dissect the periurethral tissue at the level of the midurethra toward the pubic rami bilaterally. The midurethral sling trocar was passed behind the pubic bone bilaterally.  Cystoscopy was performed confirming there had been no injury to the bladder with placement. Suture noted in bladder from laparoscopic vaginal cuff closure.  Bilateral ureteral efflux was demonstrated  Cystoscope was removed.    The bladder was emptied and the midurethral sling was positioned.  Care was taken to avoid placing tension on the urethra as the covering sheath was removed from the mesh.  The excess mesh was cut away  at the suprapubic skin and the vaginal incision was then closed with 2-0 Vicryl suture in a running locking fashion.  Inspection at this point revealed hemostasis.  Skin glue was applied to the suprapubic incisions.  Vaginal cuff was exposed vaginally and suture released in midline. Cystoscopy undertaken and bladder clear of suture material. Bilateral ureteral efflux demonstrated. No further evidence of bladder, ureteral, or urethral injury.  Vaginal cuff closed in running fashion using 2-0 vicryl. Each end of the v-lock suture anchored to 2-0 vicryl vaginal cuff closure. Cystoscopy again undertaken and bilateral ureteral efflux was demonstrated. No evidence of bladder, ureteral, or urethral injury noted. Glaser placed in bladder.  The patient was then removed from the dorsal lithotomy position, awakened, extubated and transferred from the operating room to the recovery room in stable condition, having tolerated the procedure well. Sponge, lap, & needle counts were correct.

## 2025-04-14 NOTE — INTERVAL H&P NOTE
Pre-op Diagnosis: Chronic pelvic pain in female [R10.2, G89.29]  Endometriosis [N80.9]  EDWARD (stress urinary incontinence, female) [N39.3]    The above referenced H&P was reviewed by Tone Gonzalez DO on 4/14/2025, the patient was examined and no significant changes have occurred in the patient's condition since the H&P was performed.  I discussed with the patient and/or legal representative the potential benefits, risks and side effects of this procedure; the likelihood of the patient achieving goals; and potential problems that might occur during recuperation.  I discussed reasonable alternatives to the procedure, including risks, benefits and side effects related to the alternatives and risks related to not receiving this procedure.  We will proceed with procedure as planned.

## 2025-04-15 ENCOUNTER — TELEPHONE (OUTPATIENT)
Dept: OBGYN CLINIC | Facility: CLINIC | Age: 45
End: 2025-04-15

## 2025-04-15 ENCOUNTER — TELEPHONE (OUTPATIENT)
Dept: UROLOGY | Facility: CLINIC | Age: 45
End: 2025-04-15

## 2025-04-15 NOTE — TELEPHONE ENCOUNTER
TC to pt following surgery  Doing well, no complaints  Clear urine via catheter.  Discussed surgery details  Waiting for BM  Pain controlled with PO meds (IBP & tylenol), gabapentin prn    Reviewed bowel mgmt and activity restrictions  Tolerates ambulation  Ciro PO, no N/V currently    No heavy vaginal bleeding, some spotting  No CP or SOB  All questions answered  Encouraged her to call with questions or concerns  Follow up as scheduled  She understands and agrees to plan

## 2025-04-15 NOTE — TELEPHONE ENCOUNTER
I called Katie to discuss procedure, repeat cuff closure and Glaser x 7 days. We'll await path and see her at 4-6 week zenaida. We discuss vasomotor symptoms and reasons to consider earlier ERT.

## 2025-04-21 ENCOUNTER — OFFICE VISIT (OUTPATIENT)
Dept: UROLOGY | Facility: HOSPITAL | Age: 45
End: 2025-04-21
Attending: OBSTETRICS & GYNECOLOGY
Payer: COMMERCIAL

## 2025-04-21 VITALS — BODY MASS INDEX: 27.97 KG/M2 | RESPIRATION RATE: 16 BRPM | WEIGHT: 174 LBS | HEIGHT: 66 IN

## 2025-04-21 DIAGNOSIS — Z98.890 POST-OPERATIVE STATE: Primary | ICD-10-CM

## 2025-04-21 PROCEDURE — 99212 OFFICE O/P EST SF 10 MIN: CPT

## 2025-04-21 NOTE — PROGRESS NOTES
S:   Patient here for voiding trial following surgery  Procedure Date: 4/14/25  Procedure Name: Robotic assisted lap hysterectomy,SAYDA( Dr Gonzalez) MUS,vaginal cuff closure,cysto(Dr Morales)  Doing well, no complaints  BMs regular using MOM   Pain well controlled using Ibuprofen and Other tylenol    Denies s/sx of UTI   Tolerates ambulation & diet well     O:   Vitals:    04/21/25 0840   Resp: 16     Gen: NAD  Pulm: nl effort  : No active bleeding.   Discharge small amount  Surgical sites-WNL  Woods intact and draining clear yellow urine.    300 ml sterile water was instilled into bladder per gravity, via woods per protocol.  Catheter removed per protocol.  Voided 300 mL. PVR 0       A:   S/p: Cystoscopy, Mid-urethral Sling, Robotic Hysterectomy, and Other Bilateral oophorectomy,vaginal cuff closure.    Encounter Diagnosis   Name Primary?    Post-operative state Yes     resolved/not resolved  Patient passes voiding trial.     P:  Instructed to call in early afternoon for voiding update.   Reviewed post op restrictions and bowel management  Reviewed signs and sx of urinary retention and UTI.  Discussed return to work/activity plans  Follow-up with Dr. Noemy Morales in 1 weeks.   All questions answered  She understands and agrees to plan

## 2025-04-30 ENCOUNTER — OFFICE VISIT (OUTPATIENT)
Dept: UROLOGY | Facility: HOSPITAL | Age: 45
End: 2025-04-30
Attending: OBSTETRICS & GYNECOLOGY
Payer: COMMERCIAL

## 2025-04-30 VITALS — RESPIRATION RATE: 18 BRPM | BODY MASS INDEX: 27.97 KG/M2 | TEMPERATURE: 99 F | HEIGHT: 66 IN | WEIGHT: 174 LBS

## 2025-04-30 DIAGNOSIS — Z98.890 POST-OPERATIVE STATE: Primary | ICD-10-CM

## 2025-04-30 PROCEDURE — 99212 OFFICE O/P EST SF 10 MIN: CPT

## 2025-04-30 NOTE — PROGRESS NOTES
She is s/p Post-Op Summary  Procedure Date: 04/14/25  Procedure Name: Robotic Assisted Total Hysterectomy, Mid-urethral Sling, Cystoscopy (vaginal cuff closure. Bilateral oophorectomy)    Doing well   no complaints  Voids freely  No UTIs  BMs reg  Minimal Pain  Tolerates ambulation & diet  No leakage    Temp 99 °F (37.2 °C)   Resp 18   Ht 66\"   Wt 174 lb (78.9 kg)   LMP 02/24/2024 (Exact Date)   BMI 28.08 kg/m²     Gen: NAD  CV: RRR  Pulm:nl effort  Abd: soft  : tolerated vaginal exam. Sutures intact. No active bleeding. Good support    Path benign, d/w pt    Reviewed post op restrictions and bowel management  Discussed return to work/activity plans  Call with s/sx of UTI  Plan for follow up in 3 months, sooner prn    All questions answered  She understands and agrees to plan    Noemy Morales, DO

## 2025-05-12 ENCOUNTER — OFFICE VISIT (OUTPATIENT)
Dept: OBGYN CLINIC | Facility: CLINIC | Age: 45
End: 2025-05-12

## 2025-05-12 VITALS
WEIGHT: 170.38 LBS | HEART RATE: 73 BPM | BODY MASS INDEX: 28 KG/M2 | SYSTOLIC BLOOD PRESSURE: 101 MMHG | DIASTOLIC BLOOD PRESSURE: 69 MMHG

## 2025-05-12 DIAGNOSIS — Z98.890 POST-OPERATIVE STATE: Primary | ICD-10-CM

## 2025-05-12 PROCEDURE — 3074F SYST BP LT 130 MM HG: CPT | Performed by: OBSTETRICS & GYNECOLOGY

## 2025-05-12 PROCEDURE — 3078F DIAST BP <80 MM HG: CPT | Performed by: OBSTETRICS & GYNECOLOGY

## 2025-05-12 PROCEDURE — 99024 POSTOP FOLLOW-UP VISIT: CPT | Performed by: OBSTETRICS & GYNECOLOGY

## 2025-05-12 RX ORDER — ESTRADIOL 1 MG/1
1 TABLET ORAL DAILY
Qty: 90 TABLET | Refills: 3 | Status: SHIPPED | OUTPATIENT
Start: 2025-05-12

## 2025-05-12 NOTE — PROGRESS NOTES
The following individual(s) verbally consented to be recorded using ambient AI listening technology and understand that they can each withdraw their consent to this listening technology at any point by asking the clinician to turn off or pause the recording:    Patient name: Myranda Chatman  Additional names:

## 2025-05-14 PROBLEM — G89.29 CHRONIC PELVIC PAIN IN FEMALE: Status: RESOLVED | Noted: 2024-10-29 | Resolved: 2025-05-14

## 2025-05-14 PROBLEM — N81.2 INCOMPLETE UTEROVAGINAL PROLAPSE: Status: RESOLVED | Noted: 2024-10-29 | Resolved: 2025-05-14

## 2025-05-14 PROBLEM — R10.2 CHRONIC PELVIC PAIN IN FEMALE: Status: RESOLVED | Noted: 2024-10-29 | Resolved: 2025-05-14

## 2025-05-14 PROBLEM — N80.9 ENDOMETRIOSIS DETERMINED BY LAPAROSCOPY: Status: RESOLVED | Noted: 2024-02-20 | Resolved: 2025-05-14

## 2025-05-14 PROBLEM — N39.3 SUI (STRESS URINARY INCONTINENCE, FEMALE): Status: RESOLVED | Noted: 2024-10-29 | Resolved: 2025-05-14

## 2025-05-14 NOTE — PROGRESS NOTES
POST OP VISIT:  No complaints with respect to pain, incisions, bleeding, bowel or bladder. She is having increasing flashes more recently and wants to discuss ERT.     PE: Incisions healing well with Dermabond still present. Abdomen soft and non-tender.    Pelvic- spec shows cuff intact with suture present. No blood. Bimanual shows no mass or tenderness.    IMP: Normal post op exam    PLAN: Guidance on return to activity. intercourse ok after 8 weeks. Other lifting restrictions per Dr Morales instructions. We discussed ERT with her hx of endometriosis. The 3 previous options we discussed were immediate replacement, no replacement ever and lastly delayed replacement. The larger risk for return of endometriosis would ovulatory function and that is a non-issue post Bilateral Salpingo-Oophorectomy. I have no problem with ERT at this point and we discussed patch vs pill. She chooses the latter and prescription sent. Annual with Dr Henriquez or myself in March. Mammo screening is UTD.

## 2025-05-29 ENCOUNTER — TELEPHONE (OUTPATIENT)
Dept: UROLOGY | Facility: HOSPITAL | Age: 45
End: 2025-05-29

## 2025-05-29 NOTE — TELEPHONE ENCOUNTER
TC from patient asking to extend leave of absence post surgery completed on 4/14/25.  Forms received via fax from [employer] [phone].  Verified with patient her return to work date.  Forms completed and faxed to [phone], and scanned into [employer].

## 2025-08-27 ENCOUNTER — OFFICE VISIT (OUTPATIENT)
Dept: UROLOGY | Facility: HOSPITAL | Age: 45
End: 2025-08-27
Attending: OBSTETRICS & GYNECOLOGY

## 2025-08-27 VITALS — WEIGHT: 171 LBS | BODY MASS INDEX: 27.48 KG/M2 | HEIGHT: 66 IN

## 2025-08-27 DIAGNOSIS — R35.0 URINARY FREQUENCY: ICD-10-CM

## 2025-08-27 DIAGNOSIS — N81.84 PELVIC MUSCLE WASTING: Primary | ICD-10-CM

## 2025-08-27 DIAGNOSIS — Z98.890 POST-OPERATIVE STATE: ICD-10-CM

## 2025-08-27 DIAGNOSIS — R10.2 PELVIC PAIN: ICD-10-CM

## 2025-08-27 PROCEDURE — 99212 OFFICE O/P EST SF 10 MIN: CPT

## (undated) DEVICE — UROLOGY DRAIN BAG

## (undated) DEVICE — PAD POS 36IN DISP SURGYPAD

## (undated) DEVICE — ABSORBABLE WOUND CLOSURE DEVICE: Brand: V-LOC 180

## (undated) DEVICE — GLOVE SUR 6.5 SENSICARE PI PIP GRN PWD F

## (undated) DEVICE — SINU FOAM: Brand: SINU-FOAM

## (undated) DEVICE — SOL  .9 1000ML BTL

## (undated) DEVICE — SHEETING SIL 2X2IN THK.04IN

## (undated) DEVICE — GLOVE SUR 8 SENSICARE PI MIC PIP CRM PWD F

## (undated) DEVICE — FORCEP RADIAL JAW 4

## (undated) DEVICE — ENDOSCOPY PACK UPPER: Brand: MEDLINE INDUSTRIES, INC.

## (undated) DEVICE — EYE PADSSTERILENOT MADE WITH NATURAL RUBBER LATEXSINGLE USE ONLYDO NOT USE IF PACKAGE OPENED OR DAMAGED: Brand: CARDINAL HEALTH

## (undated) DEVICE — ADHESIVE SKIN TOP FOR WND CLSR DERMBND ADV

## (undated) DEVICE — VESSEL SEALER EXTEND: Brand: ENDOWRIST

## (undated) DEVICE — VIOLET BRAIDED (POLYGLACTIN 910), SYNTHETIC ABSORBABLE SUTURE: Brand: COATED VICRYL

## (undated) DEVICE — SKIN PREP TRAY 4 COMPARTM TRAY: Brand: MEDLINE INDUSTRIES, INC.

## (undated) DEVICE — Device: Brand: JELCO

## (undated) DEVICE — DRAPE,LITHOTOMY,STERILE: Brand: MEDLINE

## (undated) DEVICE — GLOVE SUR 6 SENSICARE PI MIC PIP CRM PWD F

## (undated) DEVICE — STANDARD HYPODERMIC NEEDLE,POLYPROPYLENE HUB: Brand: MONOJECT

## (undated) DEVICE — 12 ML SYRINGE LUER-LOCK TIP: Brand: MONOJECT

## (undated) DEVICE — SOLUTION IRRIG 3000ML 0.9% NACL FLX CONT

## (undated) DEVICE — SOLUTION IRRIG 1000ML 0.9% NACL USP BTL

## (undated) DEVICE — ARM DRAPE

## (undated) DEVICE — SUTURE CHROMIC GUT 4-0 P-3

## (undated) DEVICE — STERILE LATEX POWDER-FREE SURGICAL GLOVESWITH NITRILE COATING: Brand: PROTEXIS

## (undated) DEVICE — SOCK CNSTR 4IN TNPSL UNV SPEC

## (undated) DEVICE — SOLUTION IV 1000ML DIL ST H2O

## (undated) DEVICE — PAD,SANITARY,11 IN,MAXI,W/WINGS,N-STRL: Brand: MEDLINE

## (undated) DEVICE — MONOPOLAR CURVED SCISSORS: Brand: ENDOWRIST

## (undated) DEVICE — MEGA SUTURECUT ND: Brand: ENDOWRIST

## (undated) DEVICE — BLADELESS OBTURATOR: Brand: WECK VISTA

## (undated) DEVICE — SUCTION CANISTER, 3000CC,SAFELINER: Brand: DEROYAL

## (undated) DEVICE — SLEEVE COMPR MD KNEE LEN SGL USE KENDALL SCD

## (undated) DEVICE — COLUMN DRAPE

## (undated) DEVICE — GLOVE SUR 6.5 SENSICARE PI MIC PIP CRM PWD F

## (undated) DEVICE — AIRSEAL TRI-LUMEN LILTERED TUBE SET: Brand: AIRSEAL

## (undated) DEVICE — DRAPE,UNDRBUT,WHT GRAD PCH,CAPPORT,20/CS: Brand: MEDLINE

## (undated) DEVICE — SOL  .9 1000ML BAG

## (undated) DEVICE — SEAL

## (undated) DEVICE — STERILE H2O FOR IRRIG 3000 ML BAG

## (undated) DEVICE — VISUALIZATION SYSTEM: Brand: CLEARIFY

## (undated) DEVICE — TUR Y CYSTO TUBING SET IRRIG L96IN

## (undated) DEVICE — PAD PREP 24X43.5IN W/ PCH

## (undated) DEVICE — AIRSEAL 8 MM ACCESS PORT AND LOW PROFILE OBTURATOR WITH BLADELESS OPTICAL TIP, 120 MM LENGTH: Brand: AIRSEAL

## (undated) DEVICE — ROBOTIC: Brand: MEDLINE INDUSTRIES, INC.

## (undated) DEVICE — CULTURE TUBE ANAEROBIC

## (undated) DEVICE — Device: Brand: DEFENDO AIR/WATER/SUCTION AND BIOPSY VALVE

## (undated) DEVICE — SUTURE ETHILON 3-0 669H

## (undated) DEVICE — ENDOSCOPY PACK - LOWER: Brand: MEDLINE INDUSTRIES, INC.

## (undated) DEVICE — VCARE SMALL, UTERINE MANIPULATOR, VAGINAL-CERVICAL-AHLUWALIA'S-RETRACTOR-ELEVATOR: Brand: VCARE

## (undated) DEVICE — CULTURE COLLECT/TRANSPORT SYS

## (undated) DEVICE — HEAD & NECK: Brand: MEDLINE INDUSTRIES, INC.

## (undated) DEVICE — JELLY,LUBE,STERILE,FLIP TOP,TUBE,2-OZ: Brand: MEDLINE

## (undated) DEVICE — TIP COVER ACCESSORY

## (undated) DEVICE — INTENDED FOR TISSUE SEPARATION, AND OTHER PROCEDURES THAT REQUIRE A SHARP SURGICAL BLADE TO PUNCTURE OR CUT.: Brand: BARD-PARKER ® STAINLESS STEEL BLADES

## (undated) DEVICE — GLOVE SUR 8.5 SENSICARE PI MIC PIP CRM PWD F

## (undated) DEVICE — NASAL ACCESSORY: Brand: MEDLINE INDUSTRIES, INC.

## (undated) DEVICE — INSUFFLATION NEEDLE TO ESTABLISH PNEUMOPERITONEUM.: Brand: INSUFFLATION NEEDLE

## (undated) DEVICE — BLADE SHVR 11CM 2MM XPS 360D

## (undated) DEVICE — MEDI-VAC NON-CONDUCTIVE SUCTION TUBING: Brand: CARDINAL HEALTH

## (undated) DEVICE — BLADE 1884004 TRICUT 5PK 4MM: Brand: TRICUT®

## (undated) DEVICE — SUT MCRYL 4-0 18IN PS-2 ABSRB UD 19MM 3/8 CIR

## (undated) DEVICE — DRAPE,ROBOTICS,STERILE: Brand: MEDLINE

## (undated) NOTE — LETTER
AUTHORIZATION FOR SURGICAL OPERATION OR OTHER PROCEDURE    1. I hereby authorize Dr. Suzy Benavides and the [facility] staff assigned to my case to perform the following operation and/or procedure at the [facility]:    Bilateral lower trapezius and thoracic paraspinals     2. My physician has explained the nature and purpose of the operation or other procedure, possible alternative methods of treatment, the risks involved, and the possibility of complication to me. I acknowledge that no guarantee has been made as to the result that may be obtained. 3.  I recognize that, during the course of this operation, or other procedure, unforseen conditions may necessitate additional or different procedure than those listed above. I, therefore, further authorize and request that the above named physician, his/her physician assistants or designees perform such procedures as are, in his/her professional opinion, necessary and desirable. 4.  Any tissue or organs removed in the operation or other procedure may be disposed of by and at the discretion of the [facility] staff and [facility] [place][note 2]. 5.  I understand that in the event of a medical emergency, I will be transported by local paramedics to [facility] or other hospital emergency department. 6.  I certify that I have read and fully understand the above consent to operation and/or other procedure. 7.  I acknowledge that my physician has explained sedation/analgesia administration to me including the risks and benefits. I consent to the administration of sedation/analgesia as may be necessary or desirable in the judgement of my physician. Witness signature: ___________________________________________________ Date:  ______/______/_____                    Time:  ________ A. M.  P.M.        Patient Nicolasa Marquez  9/19/1980  [id number]         Patient signature:  ___________________________________________________  Statement of Physician  My signature below affirms that prior to the time of the procedure, I have explained to the patient and/or his/her guardian, the risks and benefits involved in the proposed treatment and any reasonable alternative to the proposed treatment. I have also explained the risks and benefits involved in the refusal of the proposed treatment and have answered the patient's questions.                         Date:  ______/______/_______  Provider                      Signature:  __________________________________________________________       Time:  ___________ A.M    P.M.

## (undated) NOTE — LETTER
[facility] [address], [place], [place]    Authorization for Surgical Operation and Procedure                               I hereby authorize Noemy Morales MD  my physician and his/her assistants (if applicable), which may include medical students, residents, and/or fellows, to perform the following surgical operation/ procedure and administer such anesthesia as may be determined necessary by my physician: Operation/Procedure name (s) Placement of mid urethral sling, cystoscopy on Myranda Chatman   2.   I recognize that during the surgical operation/procedure, unforeseen conditions may necessitate additional or different procedures than those listed above.  I, therefore, further authorize and request that the above-named surgeon, assistants, or designees perform such procedures as are, in their judgment, necessary and desirable.    3.   My surgeon/physician has discussed prior to my surgery the potential benefits, risks and side effects of this procedure; the likelihood of achieving goals; and potential problems that might occur during recuperation.  They also discussed reasonable alternatives to the procedure, including risks, benefits, and side effects related to the alternatives and risks related to not receiving this procedure.  I have had all my questions answered and I acknowledge that no guarantee has been made as to the result that may be obtained.    4.   Should the need arise during my operation/procedure, which includes change of level of care prior to discharge, I also consent to the administration of blood and/or blood products.  Further, I understand that despite careful testing and screening of blood or blood products by collecting agencies, I may still be subject to ill effects as a result of receiving a blood transfusion and/or blood products.  The following are some, but not all, of the potential risks that can occur: fever and allergic reactions, hemolytic reactions,  transmission of diseases such as Hepatitis, AIDS and Cytomegalovirus (CMV) and fluid overload.  In the event that I wish to have an autologous transfusion of my own blood, or a directed donor transfusion, I will discuss this with my physician.  Check only if Refusing Blood or Blood Products  I understand refusal of blood or blood products as deemed necessary by my physician may have serious consequences to my condition to include possible death. I hereby assume responsibility for my refusal and release the hospital, its personnel, and my physicians from any responsibility for the consequences of my refusal.    o  Refuse   5.   I authorize the use of any specimen, organs, tissues, body parts or foreign objects that may be removed from my body during the operation/procedure for diagnosis, research or teaching purposes and their subsequent disposal by hospital authorities.  I also authorize the release of specimen test results and/or written reports to my treating physician on the hospital medical staff or other referring or consulting physicians involved in my care, at the discretion of the Pathologist or my treating physician.    6.   I consent to the photographing or videotaping of the operations or procedures to be performed, including appropriate portions of my body for medical, scientific, or educational purposes, provided my identity is not revealed by the pictures or by descriptive texts accompanying them.  If the procedure has been photographed/videotaped, the surgeon will obtain the original picture, image, videotape or CD.  The hospital will not be responsible for storage, release or maintenance of the picture, image, tape or CD.    7.   I consent to the presence of a  or observers in the operating room as deemed necessary by my physician or their designees.    8.   I recognize that in the event my procedure results in extended X-Ray/fluoroscopy time, I may develop a skin reaction.    9. If  I have a Do Not Attempt Resuscitation (DNAR) order in place, that status will be suspended while in the operating room, procedural suite, and during the recovery period unless otherwise explicitly stated by me (or a person authorized to consent on my behalf). The surgeon or my attending physician will determine when the applicable recovery period ends for purposes of reinstating the DNAR order.  10. Patients having a sterilization procedure: I understand that if the procedure is successful the results will be permanent and it will therefore be impossible for me to inseminate, conceive, or bear children.  I also understand that the procedure is intended to result in sterility, although the result has not been guaranteed.   11. I acknowledge that my physician has explained sedation/analgesia administration to me including the risk and benefits I consent to the administration of sedation/analgesia as may be necessary or desirable in the judgment of my physician.    I CERTIFY THAT I HAVE READ AND FULLY UNDERSTAND THE ABOVE CONSENT TO OPERATION and/or OTHER PROCEDURE.     ____________________________________  _________________________________        ______________________________  Signature of Patient    Signature of Responsible Person                Printed Name of Responsible Person                                      ____________________________________  _____________________________                ________________________________  Signature of Witness        Date  Time         Relationship to Patient    STATEMENT OF PHYSICIAN My signature below affirms that prior to the time of the procedure; I have explained to the patient and/or his/her legal representative, the risks and benefits involved in the proposed treatment and any reasonable alternative to the proposed treatment. I have also explained the risks and benefits involved in refusal of the proposed treatment and alternatives to the proposed treatment and have  answered the patient's questions. If I have a significant financial interest in a co-management agreement or a significant financial interest in any product or implant, or other significant relationship used in this procedure/surgery, I have disclosed this and had a discussion with my patient.     _____________________________________________________              _____________________________  (Signature of Physician)                                                                                         (Date)                                   (Time)  Patient Name: Myranda COOPER Compa      : 1980      Printed: 2025     Medical Record #: [id number]                                      Page 1 of 1

## (undated) NOTE — LETTER
Date: 2023      Patient Name: Jackie Ly      : 1980        Thank you for choosing  [facility] as your health care provider. Your physician has deemed the following medical service(s) necessary. However, your insurance plan may not pay for all of your health care and costs and may deny payment for this service. The fact that your insurance plan does not pay for an item or service does not mean you should not receive it. The purpose of this form is to help you make an informed decision about whether or not you want to receive this service(s) that may not be paid for by your insurance plan. CPT Code Description     Cost     Bilateral lower trapezius and thoracic paraspinals     I understand that the above mentioned service(s) or supply may not be covered by my insurance company.  I agree to be financially responsible for the cost of this service or supply in the event of my insurance denies payment as a non-covered benefit.        ______________________________________________________________________  Signature of Patient or Patient's Representative  Relationship  Date    ______________________________________________________________________  Signature of Witness to signing of form   Printed Name

## (undated) NOTE — MR AVS SNAPSHOT
[facility]  [address] [place] [address] [phone]               [facility] you for choosing [facility] We are glad to serve you and happy to provide you with this summary of your visit.   Pl * pregabalin 75 MG Caps   Take 1 capsule (75 mg total) by mouth every evening. What changed:  Another medication with the same name was removed. Continue taking this medication, and follow the directions you see here.    Commonly known as:  Louise Gustafson

## (undated) NOTE — LETTER
[facility]  Administration of Anesthesia  1. Simone De La Paz, or _________________________________ acting on her behalf, (Patient) (Dependent/Representative) request to receive anesthesia for my pending procedure/operation/treatment.   A ph infections, high spinal block, spinal bleeding, seizure, cardiac arrest and death. 7. AWARENESS: I understand that it is possible (but unlikely) to have explicit memory of events from the operating room while under general anesthesia.   8. ELECTROCONVULSIV (Date) (Time)                                                                                               (Responsible person in case of minor/ unconscious pt) /Relationship    My signature below affirms that prior to the time of the procedure, I have ex

## (undated) NOTE — Clinical Note
Tone - she's looking to schedule hyst (seeing you later this month), she'd like sling in combo. We can coordinate. Thanks! Noemy

## (undated) NOTE — LETTER
[facility]  Administration of Anesthesia  1. Sho Irving, or _________________________________ acting on her behalf, (Patient) (Dependent/Representative) request to receive anesthesia for my pending procedure/operation/treatment.   A ph infections, high spinal block, spinal bleeding, seizure, cardiac arrest and death. 7. AWARENESS: I understand that it is possible (but unlikely) to have explicit memory of events from the operating room while under general anesthesia.   8. ELECTROCONVULSIV (Date) (Time)                                                                                               (Responsible person in case of minor/ unconscious pt) /Relationship    My signature below affirms that prior to the time of the procedure, I have ex

## (undated) NOTE — LETTER
[facility], [place]  Authorization for Invasive Procedures  1.  I hereby authorize Dr. Jamaal Villar, my physician and whomever may be designated as the doctor's assistant, to perform the following operation and/or procedure:  Colonoscopyo performed for the purposes of advancing medicine, science, and/or education, provided my identity is not revealed. If the procedure has been videotaped, the physician/surgeon will obtain the original videotape.  The hospital will not be responsible for stor My signature below affirms that prior to the time of the procedure, I have explained to the patient and/or her legal representative, the risks and benefits involved in the proposed treatment and any reasonable alternative to the proposed treatment.  I have

## (undated) NOTE — LETTER
4/4/2024          To Whom It May Concern:    Myranda Chatman is currently under my medical care and may return to work at this time.    Please excuse Myranda for 1 days on 4/2/24 due to illness.  She may return to work on 4/3/2024.  Activity is restricted as follows: none.    If you require additional information please contact our office.        Sincerely,    Danita Henriquez MD          Document generated by:  Danita Henriquez MD

## (undated) NOTE — Clinical Note
5/24/2017              Micaela Almazan        [address] [facility] [address]         To Whom It May Concern,  Patient was seen and examined by me today patient can go back to work 5/25/17.       Sincerely,    Whitley Hobbs MD  [facility]

## (undated) NOTE — LETTER
1/11/2024              Myranda Chatman        [address] [facility] [address]         To whom it  may concern,  Myranda Chatman is under my care for gynecology care.  She has a long  history of endometriosis for which oral contraceptive pills and previous laparoscopic fulguration has not offered long term relief of her symptoms.  This letter is to request coverage to undergo treatment with Lupron for 1 year of treatment which could treat the underlying endometriosis and offer her relief from daily pelvic pain.      Sincerely,    Danita Henriquez MD  [facility] - OB/GYN  [address] [place] [address] [phone]        Document electronically generated by:  Danita Henriquez MD

## (undated) NOTE — LETTER
MAJOR CASE PREOPERATIVE INSTRUCTIONS    12/11/2024      Dear Myranda,    Your are having a Robotic Assisted Total Laparoscopic Hysterectomy-Bilateral Oophorectomy on Monday,4/14/2025 at 1:00pm at [facility].    Do not eat or drink anything (including water) after midnight the night before surgery.    Only take in clear liquids on Sunday, the day before surgery.  Some examples of clear liquids are water, coffee/tea without milk/cream, clear broth, apple juice, or any juice that you can see through, jello, popsicles, lemon ice, or clear soda like ginger ale or 7-up.    Please review and follow the attached Preoperative Antimicrobial Wash/Bath Instructions.    You are to call this office if you have any cold or flu symptoms 2 days before your scheduled surgery.    Please avoid ALL aspirin and herbal supplements 7 days before surgery.  Avoid Ibuprofen, Motrin, Aleve, or Naprosyn for 3 days before surgery.    Please avoid ALL Cannabis use 24 hours prior to surgery.    You cannot wear hair pins,wigs,artificial nail or metallic nails/ nail polish for surgery.    You will be contacted by PreAdmission Testing (PAT) usually within the week before surgery.  They will take a short medical history and let you know if any preoperative testing is needed. If you have any questions for preadmission testing please feel free to contact them by calling [phone].    Per your insurance no prior authorization is needed for surgery.    Call our office now to schedule your post-operative appointment for 4 weeks after surgery.    Please feel free to contact our office at [phone] if you have any questions regarding these instructions or your procedure.      Sincerely,          Tone Gonzalez, DO  [facility] - OB/GYN  [address] [place] [address] [phone]

## (undated) NOTE — LETTER
Date: 2023      Patient Name: Lyndsey Gomez      : 1980        Thank you for choosing  [facility] as your health care provider. Your physician has deemed the following medical service(s) necessary. However, your insurance plan may not pay for all of your health care and costs and may deny payment for this service. The fact that your insurance plan does not pay for an item or service does not mean you should not receive it. The purpose of this form is to help you make an informed decision about whether or not you want to receive this service(s) that may not be paid for by your insurance plan. CPT Code Description     Cost     _________ Left cervical paraspinal, bilateral upper and lower trapezius trigger point injections       _________ ______________________________ _____________      _________ ______________________________ _____________      I understand that the above mentioned service(s) or supply may not be covered by my insurance company.  I agree to be financially responsible for the cost of this service or supply in the event of my insurance denies payment as a non-covered benefit.        ______________________________________________________________________  Signature of Patient or Patient's Representative  Relationship  Date    ______________________________________________________________________  Signature of Witness to signing of form   Printed Name

## (undated) NOTE — LETTER
[facility], [place]  Authorization for Invasive Procedures  1.  I hereby authorize Dr. Serjio Mckeon , my physician and whomever may be designated as the doctor's assistant, to perform the following operation and/or procedure Colonoscopy a performed for the purposes of advancing medicine, science, and/or education, provided my identity is not revealed. If the procedure has been videotaped, the physician/surgeon will obtain the original videotape.  The hospital will not be responsible for stor My signature below affirms that prior to the time of the procedure, I have explained to the patient and/or her legal representative, the risks and benefits involved in the proposed treatment and any reasonable alternative to the proposed treatment.  I have

## (undated) NOTE — MR AVS SNAPSHOT
[address] at [address]\[facility]\""[facility] [address] [id number] [phone]               Thank you for choosing us for your health care visit with Hilary Franklin MD.  We are glad to serve you and happy to provide you with this may be held responsible for payment in full if proper authorization is not acquired. Please contact the Patient Business Office at [phone] if you have any questions related to insurance coverage.          Referral Details     Referred By    Referred T authorization, such as [place], please feel free to schedule your appointment immediately. However, if you are unsure about the requirements for authorization, please wait 5-7 days and then contact your physician's   office.  At that time, you lorne and this prescription was added. Make sure you understand how and when to take each.    Commonly known as:  LYRICA           Meloxicam 15 MG Tabs   Take 1 tablet (15 mg total) by mouth daily as needed for Pain.           metRONIDAZOLE 500 MG Tabs   Commonly Support Staff. Remember, MyChart is NOT to be used for urgent needs. For medical emergencies, dial 911.         Educational Information     Healthy Diet and Regular Exercise  [employer] for making healthy food choices  -   Enjoy

## (undated) NOTE — Clinical Note
Checo Gill - I saw Jamie Pate today with [facility]. I've recommended bladder testing. I will work to manage her sx. I appreciate the opportunity to participate in her care.  Thanks, Marcial Sutton

## (undated) NOTE — LETTER
[facility] [address] , [place]   Date:   9/7/2023     Name:   Carlos Arcos    YOB: 1980   MRN:   [id number]       [facility]? Zenaida the areas on your body where you feel the described sensations. Use the appropriate symbol. Carla Bates the areas of radiation. Include all affected areas. Just to complete the picture, please draw in the face. ACHE:  ^ ^ ^   NUMBNESS:  0000   PINS & NEEDLES:  = = = =                              ^ ^ ^                       0000              = = = =                                    ^ ^ ^                       0000            = = = =      BURNING:  XXXX   STABBING: ////                  XXXX                ////                         XXXX          ////     Please zenaida the line below indicating your degree of pain right now  with 0 being no pain 10 being the worst pain possible.                                          0             1             2              3             4              5              6              7             8             9             10         Patient Signature:

## (undated) NOTE — LETTER
11/28/2017              Laura [address] 22         Dear Rajni Tovar,    This letter is to inform you that our office has made several attempts to reach you by phone without success.   We were attempting to contact you by

## (undated) NOTE — LETTER
[facility]  [address], [place], [place]    Authorization for Surgical Operation and Procedure                               I hereby authorize Tone Gonzalez DO, my physician and his/her assistants (if applicable), which may include medical students, residents, and/or fellows, to perform the following surgical operation/ procedure and administer such anesthesia as may be determined necessary by my physician: Operation/Procedure name (s) XI robot assisted total laparoscopic hysterectomy, bilateral oophorectomy on Myranda Chatman   2.   I recognize that during the surgical operation/procedure, unforeseen conditions may necessitate additional or different procedures than those listed above.  I, therefore, further authorize and request that the above-named surgeon, assistants, or designees perform such procedures as are, in their judgment, necessary and desirable.    3.   My surgeon/physician has discussed prior to my surgery the potential benefits, risks and side effects of this procedure; the likelihood of achieving goals; and potential problems that might occur during recuperation.  They also discussed reasonable alternatives to the procedure, including risks, benefits, and side effects related to the alternatives and risks related to not receiving this procedure.  I have had all my questions answered and I acknowledge that no guarantee has been made as to the result that may be obtained.    4.   Should the need arise during my operation/procedure, which includes change of level of care prior to discharge, I also consent to the administration of blood and/or blood products.  Further, I understand that despite careful testing and screening of blood or blood products by collecting agencies, I may still be subject to ill effects as a result of receiving a blood transfusion and/or blood products.  The following are some, but not all, of the potential risks that can occur: fever and allergic  reactions, hemolytic reactions, transmission of diseases such as Hepatitis, AIDS and Cytomegalovirus (CMV) and fluid overload.  In the event that I wish to have an autologous transfusion of my own blood, or a directed donor transfusion, I will discuss this with my physician.  Check only if Refusing Blood or Blood Products  I understand refusal of blood or blood products as deemed necessary by my physician may have serious consequences to my condition to include possible death. I hereby assume responsibility for my refusal and release the hospital, its personnel, and my physicians from any responsibility for the consequences of my refusal.    o  Refuse   5.   I authorize the use of any specimen, organs, tissues, body parts or foreign objects that may be removed from my body during the operation/procedure for diagnosis, research or teaching purposes and their subsequent disposal by hospital authorities.  I also authorize the release of specimen test results and/or written reports to my treating physician on the hospital medical staff or other referring or consulting physicians involved in my care, at the discretion of the Pathologist or my treating physician.    6.   I consent to the photographing or videotaping of the operations or procedures to be performed, including appropriate portions of my body for medical, scientific, or educational purposes, provided my identity is not revealed by the pictures or by descriptive texts accompanying them.  If the procedure has been photographed/videotaped, the surgeon will obtain the original picture, image, videotape or CD.  The hospital will not be responsible for storage, release or maintenance of the picture, image, tape or CD.    7.   I consent to the presence of a  or observers in the operating room as deemed necessary by my physician or their designees.    8.   I recognize that in the event my procedure results in extended X-Ray/fluoroscopy time, I may  develop a skin reaction.    9. If I have a Do Not Attempt Resuscitation (DNAR) order in place, that status will be suspended while in the operating room, procedural suite, and during the recovery period unless otherwise explicitly stated by me (or a person authorized to consent on my behalf). The surgeon or my attending physician will determine when the applicable recovery period ends for purposes of reinstating the DNAR order.  10. Patients having a sterilization procedure: I understand that if the procedure is successful the results will be permanent and it will therefore be impossible for me to inseminate, conceive, or bear children.  I also understand that the procedure is intended to result in sterility, although the result has not been guaranteed.   11. I acknowledge that my physician has explained sedation/analgesia administration to me including the risk and benefits I consent to the administration of sedation/analgesia as may be necessary or desirable in the judgment of my physician.    I CERTIFY THAT I HAVE READ AND FULLY UNDERSTAND THE ABOVE CONSENT TO OPERATION and/or OTHER PROCEDURE.     ____________________________________  _________________________________        ______________________________  Signature of Patient    Signature of Responsible Person                Printed Name of Responsible Person                                      ____________________________________  _____________________________                ________________________________  Signature of Witness        Date  Time         Relationship to Patient    STATEMENT OF PHYSICIAN My signature below affirms that prior to the time of the procedure; I have explained to the patient and/or his/her legal representative, the risks and benefits involved in the proposed treatment and any reasonable alternative to the proposed treatment. I have also explained the risks and benefits involved in refusal of the proposed treatment and alternatives to  the proposed treatment and have answered the patient's questions. If I have a significant financial interest in a co-management agreement or a significant financial interest in any product or implant, or other significant relationship used in this procedure/surgery, I have disclosed this and had a discussion with my patient.     _____________________________________________________              _____________________________  (Signature of Physician)                                                                                         (Date)                                   (Time)  Patient Name: Myranda KENNETH Chatman      : 1980      Printed: 2025     Medical Record #: [id number]                                      Page 1 of 1

## (undated) NOTE — LETTER
INSTRUCTIONS FOR PRE-SURGICAL   ANTIMICROBIAL BATH/SHOWER    Your doctor has recommended a pre-surgical CHG (chlorhexidine gluconate) shower/bath with Betasept (also sold as Hibiclens).  It reduces bacteria that can potentially cause infection.  Betasept is available at [facility] and usually at your local retail pharmacy.    The average adult will use a total of 6 to 10 ounces for three baths.  That is approximately two 4 oz. Bottles.  Depending on individual size, weight and number of treatments, the amount may vary.    IMPORTANT! Read ALL instructions BEFORE starting.     AVOID these areas:  Head: hair, scalp, eyes, ears, nose and mouth  Genital area (inner vaginal and inner rectal)  All open wounds (including surgical incisions)    CONCENTRATE on these areas:  Under arms  Under breast tissue  Between skin folds  Hair bearing areas of groin and between buttocks    DIRECTIONS:  Take your usual shower or bath, rinse  Pour two ounces of Betasept (or Hibiclens) onto moist washcloth  Avoiding head, wash gently (does not foam)  Let sit on skin for three minutes  Rinse completely with water and towel dry    Repeat bath/shower for three consecutive days:  First bath... Two nights before the day of surgery.  Second bath... The night before surgery.  Third bath... The morning of surgery.  Do not apply lotions, deodorants or powder after the last bath.    DISCARD REMAINING PRODUCT AFTER LAST BATH!    DRUG FACTS    Active Ingredient: Chlorhexidine gluconae solution 4%        Warnings:  For external use only.  Do not use:  If you are allergic to chlorhexidine gluconate or any other ingredients listed on the label  As a pre-surgical cleanser of head or face    STOP USE and notify doctor if :  Irritation or allergic reaction occurs  If contact occurs in eyes, ears, mouth, rinse immediately with cold water.    Keep out of reach of children.  If swallowed get medica help or contact Poison Control Center.   Store between 60-80 degrees F.    Fabric Warning!  CHG WILL STAIN YOUR FABRICS!  Use with care around shower curtains, towels washcloths rugs and clothes.  Wipe surfaces immediately if accidentally splashed.      Laundering Instructions:  CHG skin cleanser will cause stains if used with chlorinated products.  Rinse immediately and completely and use only non-chlorine detergents.

## (undated) NOTE — LETTER
AUTHORIZATION FOR SURGICAL OPERATION OR OTHER PROCEDURE    1. I hereby authorize Dr. Cynthia Davis and the [facility] staff assigned to my case to perform the following operation and/or procedure at the [facility]:    Left cervical paraspinal, bilateral upper and lower trapezius trigger point injections     2. My physician has explained the nature and purpose of the operation or other procedure, possible alternative methods of treatment, the risks involved, and the possibility of complication to me. I acknowledge that no guarantee has been made as to the result that may be obtained. 3.  I recognize that, during the course of this operation, or other procedure, unforseen conditions may necessitate additional or different procedure than those listed above. I, therefore, further authorize and request that the above named physician, his/her physician assistants or designees perform such procedures as are, in his/her professional opinion, necessary and desirable. 4.  Any tissue or organs removed in the operation or other procedure may be disposed of by and at the discretion of the [facility] staff and [facility] AT [facility]. 5.  I understand that in the event of a medical emergency, I will be transported by local paramedics to [facility] or other [facility] emergency department. 6.  I certify that I have read and fully understand the above consent to operation and/or other procedure. 7.  I acknowledge that my physician has explained sedation/analgesia administration to me including the risks and benefits. I consent to the administration of sedation/analgesia as may be necessary or desirable in the judgement of my physician. Witness signature: ___________________________________________________ Date:  ______/______/_____                    Time:  ________ A. M.  P.M.        Patient Name:  Rodolfo Calderon  9/19/1980  [id number]         Patient signature: ___________________________________________________               Statement of Physician  My signature below affirms that prior to the time of the procedure, I have explained to the patient and/or his/her guardian, the risks and benefits involved in the proposed treatment and any reasonable alternative to the proposed treatment. I have also explained the risks and benefits involved in the refusal of the proposed treatment and have answered the patient's questions.                         Date:  ______/______/_______  Provider                      Signature:  __________________________________________________________       Time:  ___________ AREYNA KINSEY

## (undated) NOTE — LETTER
AUTHORIZATION FOR SURGICAL OPERATION OR OTHER PROCEDURE    1. I hereby authorize Dr. BARRON, and [facility] staff assigned to my case to perform the following operation and/or procedure at the [facility] site:    _______________________________________________________________________________________________    LUMP REMOVAL   _______________________________________________________________________________________________    2.  My physician has explained the nature and purpose of the operation or other procedure, possible alternative methods of treatment, the risks involved, and the possibility of complication to me.  I acknowledge that no guarantee has been made as to the result that may be obtained.  3.  I recognize that, during the course of this operation, or other procedure, unforseen conditions may necessitate additional or different procedure than those listed above.  I, therefore, further authorize and request that the above named physician, his/her physician assistants or designees perform such procedures as are, in his/her professional opinion, necessary and desirable.  4.  Any tissue or organs removed in the operation or other procedure may be disposed of by and at the discretion of the [facility] and [facility].  5.  I understand that in the event of a medical emergency, I will be transported by local paramedics to [facility] or other hospital emergency department.  6.  I certify that I have read and fully understand the above consent to operation and/or other procedure.    7.  I acknowledge that my physician has explained sedation/analgesia administration to me including the risks and benefits.  I consent to the administration of sedation/analgesia as may be necessary or desirable in the judgement of my physician.    Witness signature: ___________________________________________________ Date:  ______/______/_____                    Time:   ________ A.M.  P.M.       Patient Name:  ______________________________________________________  (please print)      Patient signature:  ___________________________________________________             Relationship to Patient:           []  Parent    Responsible person                          []  Spouse  In case of minor or                    [] Other  _____________   Incompetent name:  __________________________________________________                               (please print)      _____________      Responsible person  In case of minor or  Incompetent signature:  _______________________________________________    Statement of Physician  My signature below affirms that prior to the time of the procedure, I have explained to the patient and/or his/her guardian, the risks and benefits involved in the proposed treatment and any reasonable alternative to the proposed treatment.  I have also explained the risks and benefits involved in the refusal of the proposed treatment and have answered the patient's questions.                        Date:  ______/______/_______  Provider                      Signature:  __________________________________________________________       Time:  ___________ A.M    P.M.